# Patient Record
Sex: FEMALE | Race: WHITE | NOT HISPANIC OR LATINO | Employment: OTHER | URBAN - METROPOLITAN AREA
[De-identification: names, ages, dates, MRNs, and addresses within clinical notes are randomized per-mention and may not be internally consistent; named-entity substitution may affect disease eponyms.]

---

## 2017-02-13 ENCOUNTER — TRANSCRIBE ORDERS (OUTPATIENT)
Dept: ADMINISTRATIVE | Facility: HOSPITAL | Age: 62
End: 2017-02-13

## 2017-02-13 DIAGNOSIS — I35.9 AORTIC VALVE DISEASE: Primary | ICD-10-CM

## 2017-02-13 DIAGNOSIS — I51.7 LEFT VENTRICULAR HYPERTROPHY: ICD-10-CM

## 2017-02-13 DIAGNOSIS — I15.8 OTHER SECONDARY HYPERTENSION: ICD-10-CM

## 2017-03-21 ENCOUNTER — HOSPITAL ENCOUNTER (OUTPATIENT)
Dept: RADIOLOGY | Age: 62
Discharge: HOME | End: 2017-03-21
Payer: COMMERCIAL

## 2017-03-21 DIAGNOSIS — Z12.31 ENCOUNTER FOR SCREENING MAMMOGRAM FOR MALIGNANT NEOPLASM OF BREAST: ICD-10-CM

## 2017-03-21 PROCEDURE — G0202 SCR MAMMO BI INCL CAD: HCPCS

## 2017-04-04 ENCOUNTER — HOSPITAL ENCOUNTER (OUTPATIENT)
Dept: NON INVASIVE DIAGNOSTICS | Facility: CLINIC | Age: 62
Discharge: HOME | End: 2017-04-04
Payer: COMMERCIAL

## 2017-04-04 DIAGNOSIS — I51.7 LEFT VENTRICULAR HYPERTROPHY: ICD-10-CM

## 2017-04-04 DIAGNOSIS — I15.8 OTHER SECONDARY HYPERTENSION: ICD-10-CM

## 2017-04-04 DIAGNOSIS — I35.9 AORTIC VALVE DISEASE: ICD-10-CM

## 2017-04-04 PROCEDURE — 93306 TTE W/DOPPLER COMPLETE: CPT

## 2017-05-25 ENCOUNTER — ALLSCRIPTS OFFICE VISIT (OUTPATIENT)
Dept: OTHER | Facility: OTHER | Age: 62
End: 2017-05-25

## 2017-12-04 ENCOUNTER — ALLSCRIPTS OFFICE VISIT (OUTPATIENT)
Dept: OTHER | Facility: OTHER | Age: 62
End: 2017-12-04

## 2017-12-05 NOTE — PROGRESS NOTES
Assessment  Assessed    1  Aortic dilatation (447 70) (I77 819)   2  Aortic valve disease (424 1) (I35 9)   3  HTN (hypertension) (401 9) (I10)   4  LVH (left ventricular hypertrophy) (429 3) (I51 7)    Plan  Aortic dilatation, Aortic valve disease, HTN (hypertension), LVH (left ventricularhypertrophy)    · Follow-up visit in 6 months Evaluation and Treatment  Follow-up  Status: Hold For -Scheduling  Requested for: [id number]   Ordered; Aortic dilatation, Aortic valve disease, HTN (hypertension), LVH (left ventricular hypertrophy); Ordered By: Nikko Chi Performed:  Due: [id number]   · ECHO COMPLETE WITH CONTRAST IF INDICATED; Status:Need Information - FinancialAuthorization; Requested for:04Apr2018; Perform:[facility] Radiology; JKD:[id number], Aortic valve disease, HTN (hypertension), LVH (left ventricular hypertrophy); Ordered By:Brittani Joseph; Discussion/Summary  Cardiology Discussion Summary Free Text Note Form [facility]:   I will continue her present medical regimen  She appears well compensated  I've asked her to call if there is a problem in the interim otherwise I will see her again in six months time  She is due for an echo prior to her next visit to assess her aortic regurgitation and the diameter of her ascending aorta which has been mildly dilated  Chief Complaint  Chief Complaint Free Text Note Form: Pt here today for cardiology fu; no c/o      History of Present Illness  Cardiology HPI Free Text Note Form [facility]: Patient is here for a follow-up visit  She was last seen by me in May 2017  Since that time she has done well  She has had no chest pain or significant dyspnea  Aortic Regurgitation (Brief): The patient is being seen for a routine clinic follow-up of aortic valve regurgitation  The patient is currently asymptomatic  Cardiomegaly: The patient is being seen for a routine clinic follow-up of cardiomegaly  The patient is currently asymptomatic     Aortic Aneurysm, Thoracic (Brief): The patient is being seen for a routine clinic follow-up of thoracic aortic aneurysm  The patient is currently asymptomatic  Hypertension (Follow-Up): The patient presents for follow-up of essential hypertension  The patient states she has been doing well with her blood pressure control since the last visit  Symptoms:      Review of Systems  Cardiology Female ROS:    Cardiac: No complaints of chest pain, no palpitations, no fainting  Skin: No complaints of nonhealing sores or skin rash  Genitourinary: No complaints of recurrent urinary tract infections, frequent urination at night, difficult urination, blood in urine, kidney stones, loss of bladder control, kidney problems, denies any birth control or hormone replacement, is not post menopausal, not currently pregnant  Psychological: No complaints of feeling depressed, anxiety, panic attacks, or difficulty concentrating  General: No complaints of trouble sleeping, lack of energy, fatigue, appetite changes, weight changes, fever, frequent infections, or night sweats  Respiratory: No complaints of shortness of breath, cough with sputum, or wheezing  HEENT: No complaints of serious problems, hearing problems, nose problems, throat problems, or snoring  Gastrointestinal: No complaints of liver problems, nausea, vomiting, heartburn, constipation, bloody stools, diarrhea, problems swallowing, adbominal pain, or rectal bleeding  Hematologic: No complaints of bleeding disorders, anemia, blood clots, or excessive brusing  Neurological: No complaints of numbness, tingling, dizziness, weakness, seizures, headaches, syncope or fainting, AM fatigue, daytime sleepiness, no witnessed apnea episodes  Musculoskeletal: No complaints of arthritis, back pain, or painfull swelling  Active Problems  Problems    1  Aortic dilatation (447 70) (I77 819)   2  Aortic valve disease (424 1) (I35 9)   3  HTN (hypertension) (401 9) (I10)   4   LVH (left ventricular hypertrophy) (429 3) (I51 7)   5  Murmur (785 2) (R01 1)   6  Palpitations (785 1) (R00 2)    Past Medical History  Problems    1  History of carpal tunnel syndrome (V12 49) (Z86 69)   2  History of Vitamin B 12 deficiency (266 2) (E53 8)  Active Problems And Past Medical History Reviewed: The active problems and past medical history were reviewed and updated today  Surgical History  Problems    1  History of Cholecystectomy   2  History of Dilation And Curettage   3  History of Oral Surgery    Family History  Mother    1  Family history of hypertension (V17 49) (Z82 49)   2  History of heart surgery  Father    3  Family history of liver cancer (V16 0) (Z80 0)    Social History  Problems    ·    · Never a smoker   · Social alcohol use (Z78 9)   · Denied: History of Tobacco use    Current Meds   1  Calcium 600 MG Oral Tablet; take 2 tablet daily; Therapy: (Recorded:18Jun2015) to Recorded   2  Claritin 10 MG Oral Capsule; take 1 capsule daily; Therapy: (Recorded:[id number]) to Recorded   3  Labetalol HCl - 100 MG Oral Tablet; TAKE 1 TABLET EVERY 12 HOURS DAILY; Therapy: (Recorded:18Jun2015) to Recorded   4  Multivitamin TABS; TAKE 1 TABLET DAILY; Therapy: (Recorded:18Jun2015) to Recorded   5  Trandolapril 4 MG Oral Tablet; TAKE 1 TABLET DAILY; Therapy: (Recorded:18Jun2015) to Recorded   6  Verapamil HCl  MG Oral Capsule Extended Release 24 Hour; TAKE 1 CAPSULE DAILY; Therapy: (Recorded:18Jun2015) to Recorded   7  Vitamin B-12 500 MCG Oral Tablet; TAKE 1 TABLET DAILY; Therapy: (Recorded:18Jun2015) to Recorded  Medication List Reviewed: The medication list was reviewed and updated today  Allergies  Medication    1   Penicillins    Vitals  Vital Signs    Recorded: [id number] 07:53AM   Heart Rate 78, Apical   Pulse Quality Regular, Apical   Systolic 009, RUE, Sitting   Diastolic 74, RUE, Sitting   Height 5 ft 6 in   Weight 239 lb    BMI Calculated 38 58   BSA Calculated 2 16 Physical Exam   Constitutional  General appearance: No acute distress, well appearing and well nourished  Eyes  Conjunctiva and Sclera examination: Conjunctiva pink, sclera anicteric  Ears, Nose, Mouth, and Throat - Oropharynx: Clear, nares are clear, mucous membranes are moist   Neck  Neck and thyroid: Normal, supple, trachea midline, no thyromegaly  Pulmonary  Respiratory effort: No increased work of breathing or signs of respiratory distress  Auscultation of lungs: Clear to auscultation, no rales, no rhonchi, no wheezing, good air movement  Cardiovascular  Palpation of heart: Normal PMI, no thrills  Auscultation of heart: Abnormal  -- S1-S2 with an early peaking systolic murmur heard at right upper sternal border  Carotid pulses: Normal, 2+ bilaterally  Examination of extremities for edema and/or varicosities: Normal    Chest - Chest: Normal   Musculoskeletal Gait and station: Normal gait  -- Digits and nails: Normal without clubbing or cyanosis    Neurologic - Speech: Normal    Psychiatric - Orientation to person, place, and time: Normal -- Mood and affect: Normal       Signatures   Electronically signed by : RAFA Rodrigez ; Dec  4 2017  8:05AM EST                       (Author)

## 2018-01-12 VITALS
SYSTOLIC BLOOD PRESSURE: 148 MMHG | BODY MASS INDEX: 38.16 KG/M2 | HEART RATE: 74 BPM | HEIGHT: 66 IN | DIASTOLIC BLOOD PRESSURE: 82 MMHG | WEIGHT: 237.44 LBS

## 2018-01-23 VITALS
BODY MASS INDEX: 38.41 KG/M2 | SYSTOLIC BLOOD PRESSURE: 132 MMHG | HEIGHT: 66 IN | WEIGHT: 239 LBS | DIASTOLIC BLOOD PRESSURE: 74 MMHG | HEART RATE: 78 BPM

## 2018-03-27 ENCOUNTER — HOSPITAL ENCOUNTER (OUTPATIENT)
Dept: RADIOLOGY | Age: 63
Discharge: HOME | End: 2018-03-27

## 2018-03-27 DIAGNOSIS — Z12.31 ENCOUNTER FOR SCREENING MAMMOGRAM FOR MALIGNANT NEOPLASM OF BREAST: ICD-10-CM

## 2018-06-08 ENCOUNTER — TELEPHONE (OUTPATIENT)
Dept: CARDIOLOGY CLINIC | Facility: CLINIC | Age: 63
End: 2018-06-08

## 2018-06-08 DIAGNOSIS — I35.9 AORTIC VALVE DISEASE: Primary | ICD-10-CM

## 2018-12-10 ENCOUNTER — HOSPITAL ENCOUNTER (OUTPATIENT)
Dept: NON INVASIVE DIAGNOSTICS | Facility: CLINIC | Age: 63
Discharge: HOME | End: 2018-12-10
Payer: COMMERCIAL

## 2018-12-10 DIAGNOSIS — I35.9 AORTIC VALVE DISEASE: ICD-10-CM

## 2018-12-10 PROCEDURE — 93306 TTE W/DOPPLER COMPLETE: CPT | Performed by: INTERNAL MEDICINE

## 2018-12-10 PROCEDURE — 93306 TTE W/DOPPLER COMPLETE: CPT

## 2019-03-19 NOTE — PROGRESS NOTES
Cardiology Follow Up    Brayan Dias  1955  [id number]  [address] [facility] [address]    1  Essential (primary) hypertension  POCT ECG   2  LVH (left ventricular hypertrophy)  POCT ECG   3  Aortic valve disease  POCT ECG       Interval History:  Patient is here for a follow-up visit  She was most recently seen by me in December 2017  Her most recent echocardiogram done in December 2018 demonstrated lower limits of normal LV systolic function with borderline concentric left ventricular hypertrophy and mild to moderate aortic regurgitation  I did review the study and the ejection fraction to me appears to be in the 55-60% range  Compared to a prior study done April 2017 there was less aortic regurgitation noted on the present study  Patient has been feeling well  She has had no chest pain or significant dyspnea  EKG today demonstrates normal sinus rhythm with left axis deviation and minor nonspecific ST segment changes with no significant change compared to a prior tracing 05/25/2017  There is no problem list on file for this patient  History reviewed  No pertinent past medical history    Social History     Socioeconomic History    Marital status: /Civil Union     Spouse name: Not on file    Number of children: Not on file    Years of education: Not on file    Highest education level: Not on file   Occupational History    Not on file   Social Needs    Financial resource strain: Not on file    Food insecurity:     Worry: Not on file     Inability: Not on file    Transportation needs:     Medical: Not on file     Non-medical: Not on file   Tobacco Use    Smoking status: Former Smoker    Smokeless tobacco: Never Used   Substance and Sexual Activity    Alcohol use: Yes     Frequency: 2-3 times a week    Drug use: Not on file    Sexual activity: Not on file Lifestyle    Physical activity:     Days per week: Not on file     Minutes per session: Not on file    Stress: Not on file   Relationships    Social connections:     Talks on phone: Not on file     Gets together: Not on file     Attends Taoism service: Not on file     Active member of club or organization: Not on file     Attends meetings of clubs or organizations: Not on file     Relationship status: Not on file    Intimate partner violence:     Fear of current or ex partner: Not on file     Emotionally abused: Not on file     Physically abused: Not on file     Forced sexual activity: Not on file   Other Topics Concern    Not on file   Social History Narrative    Not on file      History reviewed  No pertinent family history  History reviewed  No pertinent surgical history  Current Outpatient Medications:     Calcium 600 MG tablet, Take 2 tablets by mouth daily, Disp: , Rfl:     cyanocobalamin (VITAMIN B-12) 500 mcg tablet, Take 1 tablet by mouth daily, Disp: , Rfl:     labetalol (NORMODYNE) 100 mg tablet, Every 12 hours, Disp: , Rfl:     Loratadine (CLARITIN) 10 MG CAPS, Take 1 capsule by mouth daily, Disp: , Rfl:     Multiple Vitamins-Minerals (MULTIVITAMIN ADULT) TABS, Take 1 tablet by mouth daily, Disp: , Rfl:     rosuvastatin (CRESTOR) 5 mg tablet, Take 5 mg by mouth daily, Disp: , Rfl: 0    trandolapril (MAVIK) 4 MG tablet, Daily, Disp: , Rfl:     verapamil (CALAN-SR) 240 mg CR tablet, Daily, Disp: , Rfl:   Allergies   Allergen Reactions    Penicillins Rash     Other reaction(s): PENICILLIN G POTASSIUM (PENICILLIN)         Labs:not applicable  Imaging: No results found  Review of Systems:  Review of Systems   All other systems reviewed and are negative        Physical Exam:  /78 (BP Location: Right arm, Patient Position: Sitting, Cuff Size: Large)   Pulse 74   Ht 5' 6" (1 676 m)   Wt 104 kg (229 lb 9 6 oz)   SpO2 96%   BMI 37 06 kg/m²   Physical Exam   Constitutional: She is oriented to person, place, and time  She appears well-developed and well-nourished  HENT:   Head: Normocephalic and atraumatic  Eyes: Pupils are equal, round, and reactive to light  Conjunctivae and EOM are normal    Neck: Normal range of motion  Neck supple  Cardiovascular: Normal rate and normal heart sounds  Pulmonary/Chest: Effort normal and breath sounds normal    Neurological: She is alert and oriented to person, place, and time  Skin: Skin is warm and dry  Psychiatric: She has a normal mood and affect  Vitals reviewed  Discussion/Summary:I will continue the patient's present medical regimen  The patient appears well compensated  I have asked the patient to call if there is a problem in the interim otherwise I will see the patient in one years time  Patient is due for an echocardiogram prior to the next visit to assess LV wall thickness and systolic function

## 2019-03-22 ENCOUNTER — OFFICE VISIT (OUTPATIENT)
Dept: CARDIOLOGY CLINIC | Facility: CLINIC | Age: 64
End: 2019-03-22
Payer: COMMERCIAL

## 2019-03-22 VITALS
WEIGHT: 229.6 LBS | HEART RATE: 74 BPM | HEIGHT: 66 IN | BODY MASS INDEX: 36.9 KG/M2 | OXYGEN SATURATION: 96 % | SYSTOLIC BLOOD PRESSURE: 136 MMHG | DIASTOLIC BLOOD PRESSURE: 78 MMHG

## 2019-03-22 DIAGNOSIS — I10 ESSENTIAL (PRIMARY) HYPERTENSION: Primary | ICD-10-CM

## 2019-03-22 DIAGNOSIS — I51.7 LVH (LEFT VENTRICULAR HYPERTROPHY): ICD-10-CM

## 2019-03-22 DIAGNOSIS — I35.9 AORTIC VALVE DISEASE: ICD-10-CM

## 2019-03-22 PROCEDURE — 93000 ELECTROCARDIOGRAM COMPLETE: CPT | Performed by: INTERNAL MEDICINE

## 2019-03-22 PROCEDURE — 99214 OFFICE O/P EST MOD 30 MIN: CPT | Performed by: INTERNAL MEDICINE

## 2019-03-22 RX ORDER — ROSUVASTATIN CALCIUM 5 MG/1
5 TABLET, COATED ORAL DAILY
Refills: 0 | COMMUNITY
Start: 2019-03-18 | End: 2020-10-20

## 2019-03-22 RX ORDER — TRANDOLAPRIL TABLETS 4 MG/1
TABLET ORAL DAILY
COMMUNITY
Start: 2015-12-09 | End: 2020-09-08

## 2019-03-22 RX ORDER — CHOLECALCIFEROL (VITAMIN D3) 125 MCG
1 CAPSULE ORAL DAILY
COMMUNITY

## 2019-03-22 RX ORDER — VERAPAMIL HYDROCHLORIDE 240 MG/1
TABLET, FILM COATED, EXTENDED RELEASE ORAL DAILY
COMMUNITY
End: 2020-10-20

## 2019-03-22 RX ORDER — LABETALOL 100 MG/1
TABLET, FILM COATED ORAL EVERY 12 HOURS
COMMUNITY
Start: 2015-12-09 | End: 2020-09-25

## 2019-03-22 RX ORDER — LORATADINE 10 MG/1
1 CAPSULE, LIQUID FILLED ORAL DAILY
COMMUNITY
End: 2020-03-05

## 2019-03-22 RX ORDER — IBUPROFEN 200 MG
2 CAPSULE ORAL DAILY
COMMUNITY

## 2019-03-22 RX ORDER — ELECTROLYTES/DEXTROSE
1 SOLUTION, ORAL ORAL DAILY
COMMUNITY

## 2020-02-19 ENCOUNTER — HOSPITAL ENCOUNTER (OUTPATIENT)
Dept: NON INVASIVE DIAGNOSTICS | Facility: CLINIC | Age: 65
Discharge: HOME | End: 2020-02-19
Payer: COMMERCIAL

## 2020-02-19 DIAGNOSIS — I10 ESSENTIAL (PRIMARY) HYPERTENSION: ICD-10-CM

## 2020-02-19 DIAGNOSIS — I35.9 AORTIC VALVE DISEASE: ICD-10-CM

## 2020-02-19 DIAGNOSIS — I51.7 LVH (LEFT VENTRICULAR HYPERTROPHY): ICD-10-CM

## 2020-02-19 PROCEDURE — 93306 TTE W/DOPPLER COMPLETE: CPT | Performed by: INTERNAL MEDICINE

## 2020-02-19 PROCEDURE — 93306 TTE W/DOPPLER COMPLETE: CPT

## 2020-02-29 NOTE — PROGRESS NOTES
Cardiology Follow Up    Aniyah Sadler  1955  [id number]  [address] [facility]  [address] [address] [facility]   [phone]    1  Essential (primary) hypertension  POCT ECG   2  LVH (left ventricular hypertrophy)  POCT ECG   3  Aortic valve disease  POCT ECG       Interval History:  Patient is here for a follow-up visit  Her most recent echocardiogram done in 2/2020 demonstrated  normal LV systolic function with borderline concentric left ventricular hypertrophy and mild to moderate aortic regurgitation  Compared to a prior study done April 2017 there was less aortic regurgitation noted on the present study  very mild aortic valve stenosis was noted  The peak continuous wave velocity across the aortic valve was 2 01 m/sec  Normal pulmonary artery pressure was noted  EKG today demonstrates sinus rhythm with leftward axis and nonspecific ST segment changes with no significant change compared to a prior tracing done March 27, 2019  Patient has been well  She has had no chest pain or significant dyspnea  She is now retired  Vital signs are stable today  There is no problem list on file for this patient  No past medical history on file    Social History     Socioeconomic History    Marital status: /Civil Union     Spouse name: Not on file    Number of children: Not on file    Years of education: Not on file    Highest education level: Not on file   Occupational History    Not on file   Social Needs    Financial resource strain: Not on file    Food insecurity:     Worry: Not on file     Inability: Not on file    Transportation needs:     Medical: Not on file     Non-medical: Not on file   Tobacco Use    Smoking status: Former Smoker     Types: Cigarettes     Last attempt to quit: 2005     Years since quitting: 15 1    Smokeless tobacco: Never Used   Substance and Sexual Activity    Alcohol use: Yes     Frequency: 2-3 times a week    Drug use: Not on file    Sexual activity: Not on file   Lifestyle    Physical activity:     Days per week: Not on file     Minutes per session: Not on file    Stress: Not on file   Relationships    Social connections:     Talks on phone: Not on file     Gets together: Not on file     Attends [employer] service: Not on file     Active member of club or organization: Not on file     Attends meetings of clubs or organizations: Not on file     Relationship status: Not on file    Intimate partner violence:     Fear of current or ex partner: Not on file     Emotionally abused: Not on file     Physically abused: Not on file     Forced sexual activity: Not on file   Other Topics Concern    Not on file   Social History Narrative    Not on file      No family history on file  No past surgical history on file  Current Outpatient Medications:     Calcium 600 MG tablet, Take 2 tablets by mouth daily, Disp: , Rfl:     cyanocobalamin (VITAMIN B-12) 500 mcg tablet, Take 1 tablet by mouth daily, Disp: , Rfl:     labetalol (NORMODYNE) 100 mg tablet, Every 12 hours, Disp: , Rfl:     Multiple Vitamins-Minerals (MULTIVITAMIN ADULT) TABS, Take 1 tablet by mouth daily, Disp: , Rfl:     trandolapril (MAVIK) 4 MG tablet, Daily, Disp: , Rfl:     verapamil (CALAN-SR) 240 mg CR tablet, Daily, Disp: , Rfl:     rosuvastatin (CRESTOR) 5 mg tablet, Take 5 mg by mouth daily, Disp: , Rfl: 0  Allergies   Allergen Reactions    Penicillins Rash     Other reaction(s): PENICILLIN G POTASSIUM (PENICILLIN)         Labs:not applicable  Imaging: No results found  Review of Systems:  Review of Systems   All other systems reviewed and are negative  Physical Exam:  /62 (BP Location: Left arm, Cuff Size: Large)   Pulse 66   Ht 5' 6" (1 676 m)   Wt 89 8 kg (198 lb)   BMI 31 96 kg/m²   Physical Exam   Constitutional: She is oriented to person, place, and time   She appears well-developed and well-nourished  HENT:   Head: Normocephalic and atraumatic  Eyes: Pupils are equal, round, and reactive to light  Conjunctivae and EOM are normal    Neck: Normal range of motion  Neck supple  Cardiovascular: Normal rate and normal heart sounds  Pulmonary/Chest: Effort normal and breath sounds normal    Neurological: She is alert and oriented to person, place, and time  Skin: Skin is warm and dry  Psychiatric: She has a normal mood and affect  Vitals reviewed  Discussion/Summary:I will continue the patient's present medical regimen  The patient appears well compensated  I have asked the patient to call if there is a problem in the interim otherwise I will see the patient in one years time  Patient is due for an echocardiogram prior to the next visit to assess LV wall thickness and systolic function

## 2020-03-05 ENCOUNTER — OFFICE VISIT (OUTPATIENT)
Dept: CARDIOLOGY CLINIC | Facility: CLINIC | Age: 65
End: 2020-03-05
Payer: COMMERCIAL

## 2020-03-05 VITALS
DIASTOLIC BLOOD PRESSURE: 62 MMHG | HEIGHT: 66 IN | SYSTOLIC BLOOD PRESSURE: 120 MMHG | HEART RATE: 66 BPM | BODY MASS INDEX: 31.82 KG/M2 | WEIGHT: 198 LBS

## 2020-03-05 DIAGNOSIS — I51.7 LVH (LEFT VENTRICULAR HYPERTROPHY): ICD-10-CM

## 2020-03-05 DIAGNOSIS — I35.9 AORTIC VALVE DISEASE: ICD-10-CM

## 2020-03-05 DIAGNOSIS — I10 ESSENTIAL (PRIMARY) HYPERTENSION: Primary | ICD-10-CM

## 2020-03-05 PROCEDURE — 93000 ELECTROCARDIOGRAM COMPLETE: CPT | Performed by: INTERNAL MEDICINE

## 2020-03-05 PROCEDURE — 99214 OFFICE O/P EST MOD 30 MIN: CPT | Performed by: INTERNAL MEDICINE

## 2020-09-05 DIAGNOSIS — I10 ESSENTIAL HYPERTENSION: Primary | ICD-10-CM

## 2020-09-08 RX ORDER — TRANDOLAPRIL TABLETS 4 MG/1
TABLET ORAL
Qty: 90 TABLET | Refills: 3 | Status: SHIPPED | OUTPATIENT
Start: 2020-09-08 | End: 2020-10-20 | Stop reason: SDUPTHER

## 2020-09-14 DIAGNOSIS — I10 ESSENTIAL HYPERTENSION: Primary | ICD-10-CM

## 2020-09-14 RX ORDER — VERAPAMIL HYDROCHLORIDE 80 MG/1
TABLET ORAL
Qty: 270 TABLET | Refills: 1 | Status: SHIPPED | OUTPATIENT
Start: 2020-09-14 | End: 2020-10-20 | Stop reason: SDUPTHER

## 2020-09-24 DIAGNOSIS — I10 ESSENTIAL HYPERTENSION: Primary | ICD-10-CM

## 2020-09-25 RX ORDER — LABETALOL 100 MG/1
TABLET, FILM COATED ORAL
Qty: 180 TABLET | Refills: 3 | Status: SHIPPED | OUTPATIENT
Start: 2020-09-25 | End: 2020-10-20 | Stop reason: SDUPTHER

## 2020-10-02 LAB
ALBUMIN SERPL-MCNC: 4.1 G/DL (ref 3.6–5.1)
ALBUMIN/GLOB SERPL: 1.9 (CALC) (ref 1–2.5)
ALP SERPL-CCNC: 67 U/L (ref 37–153)
ALT SERPL-CCNC: 9 U/L (ref 6–29)
APPEARANCE UR: CLEAR
AST SERPL-CCNC: 13 U/L (ref 10–35)
BACTERIA UR QL AUTO: ABNORMAL /HPF
BASOPHILS # BLD AUTO: 41 CELLS/UL (ref 0–200)
BASOPHILS NFR BLD AUTO: 0.8 %
BILIRUB SERPL-MCNC: 0.6 MG/DL (ref 0.2–1.2)
BILIRUB UR QL STRIP: NEGATIVE
BUN SERPL-MCNC: 25 MG/DL (ref 7–25)
BUN/CREAT SERPL: ABNORMAL (CALC) (ref 6–22)
CALCIUM SERPL-MCNC: 9.1 MG/DL (ref 8.6–10.4)
CHLORIDE SERPL-SCNC: 106 MMOL/L (ref 98–110)
CHOLEST SERPL-MCNC: 218 MG/DL
CHOLEST/HDLC SERPL: 3.1 (CALC)
CO2 SERPL-SCNC: 26 MMOL/L (ref 20–32)
COLOR UR: YELLOW
CREAT SERPL-MCNC: 0.96 MG/DL (ref 0.5–0.99)
EOSINOPHIL # BLD AUTO: 112 CELLS/UL (ref 15–500)
EOSINOPHIL NFR BLD AUTO: 2.2 %
ERYTHROCYTE [DISTWIDTH] IN BLOOD BY AUTOMATED COUNT: 14 % (ref 11–15)
GLOBULIN SER CALC-MCNC: 2.2 G/DL (CALC) (ref 1.9–3.7)
GLUCOSE SERPL-MCNC: 113 MG/DL (ref 65–99)
GLUCOSE UR QL STRIP: NEGATIVE
HCT VFR BLD AUTO: 43.6 % (ref 35–45)
HDLC SERPL-MCNC: 71 MG/DL
HGB BLD-MCNC: 14.6 G/DL (ref 11.7–15.5)
HGB UR QL STRIP: NEGATIVE
HYALINE CASTS #/AREA URNS LPF: ABNORMAL /LPF
KETONES UR QL STRIP: NEGATIVE
LDLC SERPL CALC-MCNC: 118 MG/DL (CALC)
LEUKOCYTE ESTERASE UR QL STRIP: ABNORMAL
LYMPHOCYTES # BLD AUTO: 1540 CELLS/UL (ref 850–3900)
LYMPHOCYTES NFR BLD AUTO: 30.2 %
MCH RBC QN AUTO: 31.2 PG (ref 27–33)
MCHC RBC AUTO-ENTMCNC: 33.5 G/DL (ref 32–36)
MCV RBC AUTO: 93.2 FL (ref 80–100)
MONOCYTES # BLD AUTO: 372 CELLS/UL (ref 200–950)
MONOCYTES NFR BLD AUTO: 7.3 %
NEUTROPHILS # BLD AUTO: 3035 CELLS/UL (ref 1500–7800)
NEUTROPHILS NFR BLD AUTO: 59.5 %
NITRITE UR QL STRIP: NEGATIVE
NONHDLC SERPL-MCNC: 147 MG/DL (CALC)
PH UR STRIP: 5.5 [PH] (ref 5–8)
PLATELET # BLD AUTO: 175 THOUSAND/UL (ref 140–400)
PMV BLD REES-ECKER: 11.6 FL (ref 7.5–12.5)
POTASSIUM SERPL-SCNC: 4.4 MMOL/L (ref 3.5–5.3)
PROT SERPL-MCNC: 6.3 G/DL (ref 6.1–8.1)
PROT UR QL STRIP: NEGATIVE
RBC # BLD AUTO: 4.68 MILLION/UL (ref 3.8–5.1)
RBC #/AREA URNS HPF: ABNORMAL /HPF
SL AMB EGFR AFRICAN AMERICAN: 72 ML/MIN/1.73M2
SL AMB EGFR NON AFRICAN AMERICAN: 62 ML/MIN/1.73M2
SODIUM SERPL-SCNC: 140 MMOL/L (ref 135–146)
SP GR UR STRIP: 1.02 (ref 1–1.03)
SQUAMOUS #/AREA URNS HPF: ABNORMAL /HPF
TRIGL SERPL-MCNC: 171 MG/DL
TSH SERPL-ACNC: 2.02 MIU/L (ref 0.4–4.5)
WBC # BLD AUTO: 5.1 THOUSAND/UL (ref 3.8–10.8)
WBC #/AREA URNS HPF: ABNORMAL /HPF

## 2020-10-20 ENCOUNTER — OFFICE VISIT (OUTPATIENT)
Dept: INTERNAL MEDICINE CLINIC | Facility: CLINIC | Age: 65
End: 2020-10-20
Payer: COMMERCIAL

## 2020-10-20 VITALS
WEIGHT: 214 LBS | HEIGHT: 66 IN | HEART RATE: 73 BPM | SYSTOLIC BLOOD PRESSURE: 138 MMHG | BODY MASS INDEX: 34.39 KG/M2 | TEMPERATURE: 97.9 F | DIASTOLIC BLOOD PRESSURE: 88 MMHG

## 2020-10-20 DIAGNOSIS — G47.33 OBSTRUCTIVE SLEEP APNEA SYNDROME: ICD-10-CM

## 2020-10-20 DIAGNOSIS — E53.8 VITAMIN B12 DEFICIENCY: ICD-10-CM

## 2020-10-20 DIAGNOSIS — I10 ESSENTIAL HYPERTENSION: Primary | ICD-10-CM

## 2020-10-20 DIAGNOSIS — I35.1 NONRHEUMATIC AORTIC (VALVE) INSUFFICIENCY: ICD-10-CM

## 2020-10-20 PROCEDURE — 99214 OFFICE O/P EST MOD 30 MIN: CPT | Performed by: INTERNAL MEDICINE

## 2020-10-20 RX ORDER — VERAPAMIL HYDROCHLORIDE 80 MG/1
80 TABLET ORAL 3 TIMES DAILY
Qty: 270 TABLET | Refills: 1 | Status: SHIPPED | OUTPATIENT
Start: 2020-10-20 | End: 2021-07-06

## 2020-10-20 RX ORDER — LABETALOL 100 MG/1
100 TABLET, FILM COATED ORAL 2 TIMES DAILY
Qty: 180 TABLET | Refills: 1 | Status: SHIPPED | OUTPATIENT
Start: 2020-10-20 | End: 2021-05-26 | Stop reason: SDUPTHER

## 2020-10-20 RX ORDER — TRANDOLAPRIL TABLETS 4 MG/1
4 TABLET ORAL DAILY
Qty: 90 TABLET | Refills: 1 | Status: SHIPPED | OUTPATIENT
Start: 2020-10-20 | End: 2021-07-08

## 2020-10-21 ENCOUNTER — TELEPHONE (OUTPATIENT)
Dept: INTERNAL MEDICINE CLINIC | Facility: CLINIC | Age: 65
End: 2020-10-21

## 2021-02-22 ENCOUNTER — HOSPITAL ENCOUNTER (OUTPATIENT)
Dept: NON INVASIVE DIAGNOSTICS | Facility: CLINIC | Age: 66
Discharge: HOME | End: 2021-02-22
Payer: COMMERCIAL

## 2021-02-22 DIAGNOSIS — I10 ESSENTIAL (PRIMARY) HYPERTENSION: ICD-10-CM

## 2021-02-22 DIAGNOSIS — I35.9 AORTIC VALVE DISEASE: ICD-10-CM

## 2021-02-22 DIAGNOSIS — I51.7 LVH (LEFT VENTRICULAR HYPERTROPHY): ICD-10-CM

## 2021-02-22 PROCEDURE — 93306 TTE W/DOPPLER COMPLETE: CPT | Performed by: INTERNAL MEDICINE

## 2021-02-22 PROCEDURE — 93306 TTE W/DOPPLER COMPLETE: CPT

## 2021-02-24 ENCOUNTER — OFFICE VISIT (OUTPATIENT)
Dept: INTERNAL MEDICINE CLINIC | Facility: CLINIC | Age: 66
End: 2021-02-24
Payer: COMMERCIAL

## 2021-02-24 VITALS
DIASTOLIC BLOOD PRESSURE: 84 MMHG | WEIGHT: 220 LBS | TEMPERATURE: 98.5 F | OXYGEN SATURATION: 97 % | HEIGHT: 66 IN | SYSTOLIC BLOOD PRESSURE: 137 MMHG | BODY MASS INDEX: 35.36 KG/M2 | HEART RATE: 76 BPM

## 2021-02-24 DIAGNOSIS — G47.33 OBSTRUCTIVE SLEEP APNEA SYNDROME: ICD-10-CM

## 2021-02-24 DIAGNOSIS — E78.2 MIXED HYPERLIPIDEMIA: ICD-10-CM

## 2021-02-24 DIAGNOSIS — E53.8 VITAMIN B12 DEFICIENCY: ICD-10-CM

## 2021-02-24 DIAGNOSIS — G43.109 MIGRAINE WITH AURA AND WITHOUT STATUS MIGRAINOSUS, NOT INTRACTABLE: Primary | ICD-10-CM

## 2021-02-24 DIAGNOSIS — Z11.59 NEED FOR HEPATITIS C SCREENING TEST: ICD-10-CM

## 2021-02-24 DIAGNOSIS — I10 ESSENTIAL HYPERTENSION: ICD-10-CM

## 2021-02-24 DIAGNOSIS — I35.1 NONRHEUMATIC AORTIC (VALVE) INSUFFICIENCY: ICD-10-CM

## 2021-02-24 PROCEDURE — 99214 OFFICE O/P EST MOD 30 MIN: CPT | Performed by: INTERNAL MEDICINE

## 2021-02-24 RX ORDER — PROTRIPTYLINE HYDROCHLORIDE 5 MG/1
5 TABLET, FILM COATED ORAL
Qty: 30 TABLET | Refills: 2 | Status: SHIPPED | OUTPATIENT
Start: 2021-02-24 | End: 2021-04-07 | Stop reason: SDUPTHER

## 2021-02-24 RX ORDER — ROSUVASTATIN CALCIUM 5 MG/1
5 TABLET, COATED ORAL DAILY
Qty: 30 TABLET | Refills: 2 | Status: SHIPPED | OUTPATIENT
Start: 2021-02-24 | End: 2021-04-07 | Stop reason: SDUPTHER

## 2021-02-24 NOTE — PROGRESS NOTES
Assessment/Plan:           1  Migraine with aura and without status migrainosus, not intractable  -     protriptyline (VIVACTIL) 5 MG tablet; Take 1 tablet (5 mg total) by mouth daily at bedtime    2  Essential hypertension    3  Need for hepatitis C screening test  -     Hepatitis C antibody; Future    4  Nonrheumatic aortic (valve) insufficiency    5  Vitamin B12 deficiency    6  Obstructive sleep apnea syndrome    7  Mixed hyperlipidemia  -     rosuvastatin (CRESTOR) 5 mg tablet; Take 1 tablet (5 mg total) by mouth daily           1  Essential hypertension      2  Need for hepatitis C screening test    - Hepatitis C antibody; Future    3  Nonrheumatic aortic (valve) insufficiency      4  Vitamin B12 deficiency      5  Obstructive sleep apnea syndrome             Subjective:      Patient ID: Lorraine Keyes is a 76 y o  female  HPI    The following portions of the patient's history were reviewed and updated as appropriate: She  has a past medical history of Allergic rhinitis, Aortic regurgitation, HTN (hypertension), Hyperlipidemia, Nonrheumatic aortic (valve) insufficiency, Sleep apnea, and Vitamin B12 deficiency  She   Patient Active Problem List    Diagnosis Date Noted    Nonrheumatic aortic (valve) insufficiency     Sleep apnea     Vitamin B12 deficiency      She  has a past surgical history that includes Dilation and curettage of uterus; [place] tooth extraction; Bunionectomy; Cholecystectomy; Colonoscopy (07/02/2018); and Mammo (historical) (03/21/2017)  Her family history includes Coronary artery disease in her mother; Kidney cancer in her father; No Known Problems in her sister, sister, and sister  She  reports that she quit smoking about 16 years ago  Her smoking use included cigarettes  She has never used smokeless tobacco  She reports current alcohol use  No history on file for drug    Current Outpatient Medications   Medication Sig Dispense Refill    Calcium 600 MG tablet Take 2 tablets by mouth daily      cyanocobalamin (VITAMIN B-12) 500 mcg tablet Take 1 tablet by mouth daily      labetalol (NORMODYNE) 100 mg tablet Take 1 tablet (100 mg total) by mouth 2 (two) times a day 180 tablet 1    Multiple Vitamins-Minerals (MULTIVITAMIN ADULT) TABS Take 1 tablet by mouth daily      trandolapril (MAVIK) 4 MG tablet Take 1 tablet (4 mg total) by mouth daily 90 tablet 1    verapamil (CALAN) 80 mg tablet Take 1 tablet (80 mg total) by mouth 3 (three) times a day 270 tablet 1    protriptyline (VIVACTIL) 5 MG tablet Take 1 tablet (5 mg total) by mouth daily at bedtime 30 tablet 2    rosuvastatin (CRESTOR) 5 mg tablet Take 1 tablet (5 mg total) by mouth daily 30 tablet 2     No current facility-administered medications for this visit  Current Outpatient Medications on File Prior to Visit   Medication Sig    Calcium 600 MG tablet Take 2 tablets by mouth daily    cyanocobalamin (VITAMIN B-12) 500 mcg tablet Take 1 tablet by mouth daily    labetalol (NORMODYNE) 100 mg tablet Take 1 tablet (100 mg total) by mouth 2 (two) times a day    Multiple Vitamins-Minerals (MULTIVITAMIN ADULT) TABS Take 1 tablet by mouth daily    trandolapril (MAVIK) 4 MG tablet Take 1 tablet (4 mg total) by mouth daily    verapamil (CALAN) 80 mg tablet Take 1 tablet (80 mg total) by mouth 3 (three) times a day     No current facility-administered medications on file prior to visit  She is allergic to penicillins       Review of Systems   Constitutional: Negative for appetite change, chills, fatigue and fever  HENT: Negative for sore throat and trouble swallowing  Eyes: Negative for redness  Respiratory: Negative for shortness of breath  Cardiovascular: Negative for chest pain and palpitations  Gastrointestinal: Negative for abdominal pain, constipation and diarrhea  Genitourinary: Negative for dysuria and hematuria  Musculoskeletal: Negative for back pain and neck pain  Skin: Negative for rash     Neurological: Positive for headaches  Negative for seizures and weakness  Hematological: Negative for adenopathy  Psychiatric/Behavioral: Negative for confusion  The patient is not nervous/anxious  Objective:      /84 (BP Location: Left arm, Patient Position: Sitting, Cuff Size: Standard)   Pulse 76   Temp 98 5 °F (36 9 °C) (Temporal)   Ht 5' 6" (1 676 m)   Wt 99 8 kg (220 lb)   SpO2 97%   BMI 35 51 kg/m²     No results found for this or any previous visit (from the past 1344 hour(s))  Physical Exam  Constitutional:       General: She is not in acute distress  Appearance: Normal appearance  HENT:      Head: Normocephalic and atraumatic  Nose: Nose normal       Mouth/Throat:      Mouth: Mucous membranes are moist    Eyes:      Extraocular Movements: Extraocular movements intact  Pupils: Pupils are equal, round, and reactive to light  Neck:      Musculoskeletal: Normal range of motion and neck supple  Cardiovascular:      Rate and Rhythm: Normal rate and regular rhythm  Pulses: Normal pulses  Heart sounds: Murmur present  No friction rub  Pulmonary:      Effort: Pulmonary effort is normal  No respiratory distress  Breath sounds: Normal breath sounds  No wheezing  Abdominal:      General: Abdomen is flat  Bowel sounds are normal  There is no distension  Palpations: Abdomen is soft  There is no mass  Tenderness: There is no abdominal tenderness  There is no guarding  Musculoskeletal: Normal range of motion  Skin:     General: Skin is warm and dry  Neurological:      General: No focal deficit present  Mental Status: She is alert and oriented to person, place, and time  Mental status is at baseline  Cranial Nerves: No cranial nerve deficit  Psychiatric:         Mood and Affect: Mood normal          Behavior: Behavior normal        BMI Counseling: Body mass index is 35 51 kg/m²   The BMI is above normal  Nutrition recommendations include reducing portion sizes, decreasing overall calorie intake and 3-5 servings of fruits/vegetables daily

## 2021-03-10 DIAGNOSIS — Z23 ENCOUNTER FOR IMMUNIZATION: ICD-10-CM

## 2021-03-13 NOTE — PROGRESS NOTES
Cardiology Follow Up    Fito Hines  1955  [id number]  [facility]  [address]   [phone]    1  Essential (primary) hypertension  Echo complete with contrast if indicated    POCT ECG   2  LVH (left ventricular hypertrophy)  Echo complete with contrast if indicated    POCT ECG   3  Aortic valve disease  Echo complete with contrast if indicated    POCT ECG       Interval History:  Patient is here for a follow-up visit  Patient with history of aortic valve disease   Her most recent echocardiogram done February 2021 demonstrated preserved LV systolic function with fibrocalcific disease of the aortic valve with moderate AI  The ascending aorta was 4 0 cm  Peak continuous wave velocity across the aortic valve was 2 26 m/sec with a mean gradient of 10 6 mmHg  LVOT max velocity was 1 42 m/sec  Prior study done February 2020 demonstrated mild to moderate aortic AI  Lipid profile done October 2020 demonstrated total cholesterol of 218 with an HDL of 71 and a calculated LDL of 118  EKG today demonstrates sinus rhythm with poor R-wave progression with no significant change compared to a prior tracing done March 5, 2020  Vital signs are stable today      Patient Active Problem List   Diagnosis    Nonrheumatic aortic (valve) insufficiency    Sleep apnea    Vitamin B12 deficiency     Past Medical History:   Diagnosis Date    Allergic rhinitis     Aortic regurgitation     HTN (hypertension)     Hyperlipidemia     Nonrheumatic aortic (valve) insufficiency     Sleep apnea     Vitamin B12 deficiency      Social History     Socioeconomic History    Marital status: /Civil Union     Spouse name: Not on file    Number of children: 2    Years of education: Not on file    Highest education level: Not on file   Occupational History    Not on file   Social Needs    Financial resource strain: Not on file    Food insecurity     Worry: Not on file     Inability: Not on file    Transportation needs     Medical: Not on file     Non-medical: Not on file   Tobacco Use    Smoking status: Former Smoker     Types: Cigarettes     Quit date:      Years since quittin 2    Smokeless tobacco: Never Used   Substance and Sexual Activity    Alcohol use: Yes     Frequency: 2-3 times a week    Drug use: Not on file    Sexual activity: Not on file   Lifestyle    Physical activity     Days per week: Not on file     Minutes per session: Not on file    Stress: Not on file   Relationships    Social connections     Talks on phone: Not on file     Gets together: Not on file     Attends Rastafari service: Not on file     Active member of club or organization: Not on file     Attends meetings of clubs or organizations: Not on file     Relationship status: Not on file    Intimate partner violence     Fear of current or ex partner: Not on file     Emotionally abused: Not on file     Physically abused: Not on file     Forced sexual activity: Not on file   Other Topics Concern    Not on file   Social History Narrative    2 daughters    Travel outside [place]: No       Family History   Problem Relation Age of Onset    Coronary artery disease Mother     Kidney cancer Father     No Known Problems Sister     No Known Problems Sister     No Known Problems Sister      Past Surgical History:   Procedure Laterality Date    BUNIONECTOMY      CHOLECYSTECTOMY      COLONOSCOPY  2018    DILATION AND CURETTAGE OF UTERUS      MAMMO (HISTORICAL)  2017 and 3/21/2017     WISDOM TOOTH EXTRACTION         Current Outpatient Medications:     Calcium 600 MG tablet, Take 2 tablets by mouth daily, Disp: , Rfl:     cyanocobalamin (VITAMIN B-12) 500 mcg tablet, Take 1 tablet by mouth daily, Disp: , Rfl:     labetalol (NORMODYNE) 100 mg tablet, Take 1 tablet (100 mg total) by mouth 2 (two) times a day, Disp: 180 tablet, Rfl: 1    Multiple Vitamins-Minerals (MULTIVITAMIN ADULT) TABS, Take 1 tablet by mouth daily, Disp: , Rfl:     protriptyline (VIVACTIL) 5 MG tablet, Take 1 tablet (5 mg total) by mouth daily at bedtime, Disp: 30 tablet, Rfl: 2    rosuvastatin (CRESTOR) 5 mg tablet, Take 1 tablet (5 mg total) by mouth daily, Disp: 30 tablet, Rfl: 2    trandolapril (MAVIK) 4 MG tablet, Take 1 tablet (4 mg total) by mouth daily, Disp: 90 tablet, Rfl: 1    verapamil (CALAN) 80 mg tablet, Take 1 tablet (80 mg total) by mouth 3 (three) times a day, Disp: 270 tablet, Rfl: 1  Allergies   Allergen Reactions    Penicillins Rash     Other reaction(s): PENICILLIN G POTASSIUM (PENICILLIN)         Labs:not applicable  Imaging: No results found  Review of Systems:  Review of Systems   All other systems reviewed and are negative  Physical Exam:  /80 (BP Location: Right arm, Cuff Size: Large)   Pulse 64   Ht 5' 6" (1 676 m)   Wt 98 9 kg (218 lb)   BMI 35 19 kg/m²   Physical Exam  Vitals signs reviewed  Constitutional:       Appearance: She is well-developed  HENT:      Head: Normocephalic and atraumatic  Eyes:      Conjunctiva/sclera: Conjunctivae normal       Pupils: Pupils are equal, round, and reactive to light  Neck:      Musculoskeletal: Normal range of motion and neck supple  Cardiovascular:      Rate and Rhythm: Normal rate  Heart sounds: Normal heart sounds  Pulmonary:      Effort: Pulmonary effort is normal       Breath sounds: Normal breath sounds  Skin:     General: Skin is warm and dry  Neurological:      Mental Status: She is alert and oriented to person, place, and time  Discussion/Summary:I will continue the patient's present medical regimen  The patient appears well compensated  I have asked the patient to call if there is a problem in the interim otherwise I will see the patient in one years time   Patient is due for an echocardiogram prior to the next visit to assess LV wall thickness and systolic function

## 2021-03-23 ENCOUNTER — OFFICE VISIT (OUTPATIENT)
Dept: CARDIOLOGY CLINIC | Facility: CLINIC | Age: 66
End: 2021-03-23
Payer: COMMERCIAL

## 2021-03-23 VITALS
WEIGHT: 218 LBS | SYSTOLIC BLOOD PRESSURE: 140 MMHG | HEART RATE: 64 BPM | DIASTOLIC BLOOD PRESSURE: 80 MMHG | HEIGHT: 66 IN | BODY MASS INDEX: 35.03 KG/M2

## 2021-03-23 DIAGNOSIS — I35.9 AORTIC VALVE DISEASE: ICD-10-CM

## 2021-03-23 DIAGNOSIS — I10 ESSENTIAL (PRIMARY) HYPERTENSION: Primary | ICD-10-CM

## 2021-03-23 DIAGNOSIS — I51.7 LVH (LEFT VENTRICULAR HYPERTROPHY): ICD-10-CM

## 2021-03-23 PROCEDURE — 93000 ELECTROCARDIOGRAM COMPLETE: CPT | Performed by: INTERNAL MEDICINE

## 2021-03-23 PROCEDURE — 99214 OFFICE O/P EST MOD 30 MIN: CPT | Performed by: INTERNAL MEDICINE

## 2021-04-07 ENCOUNTER — OFFICE VISIT (OUTPATIENT)
Dept: INTERNAL MEDICINE CLINIC | Facility: CLINIC | Age: 66
End: 2021-04-07
Payer: COMMERCIAL

## 2021-04-07 VITALS
BODY MASS INDEX: 34.72 KG/M2 | TEMPERATURE: 97.7 F | OXYGEN SATURATION: 97 % | SYSTOLIC BLOOD PRESSURE: 129 MMHG | HEIGHT: 66 IN | WEIGHT: 216 LBS | DIASTOLIC BLOOD PRESSURE: 87 MMHG | HEART RATE: 81 BPM

## 2021-04-07 DIAGNOSIS — I10 ESSENTIAL HYPERTENSION: ICD-10-CM

## 2021-04-07 DIAGNOSIS — G43.109 MIGRAINE WITH AURA AND WITHOUT STATUS MIGRAINOSUS, NOT INTRACTABLE: Primary | ICD-10-CM

## 2021-04-07 DIAGNOSIS — E78.2 MIXED HYPERLIPIDEMIA: ICD-10-CM

## 2021-04-07 DIAGNOSIS — I77.819 AORTIC DILATATION (HCC): ICD-10-CM

## 2021-04-07 DIAGNOSIS — Z00.00 MEDICARE ANNUAL WELLNESS VISIT, INITIAL: ICD-10-CM

## 2021-04-07 DIAGNOSIS — I35.1 NONRHEUMATIC AORTIC (VALVE) INSUFFICIENCY: ICD-10-CM

## 2021-04-07 PROCEDURE — G0439 PPPS, SUBSEQ VISIT: HCPCS | Performed by: INTERNAL MEDICINE

## 2021-04-07 PROCEDURE — 99214 OFFICE O/P EST MOD 30 MIN: CPT | Performed by: INTERNAL MEDICINE

## 2021-04-07 RX ORDER — PROTRIPTYLINE HYDROCHLORIDE 5 MG/1
5 TABLET, FILM COATED ORAL
Qty: 30 TABLET | Refills: 2 | Status: SHIPPED | OUTPATIENT
Start: 2021-04-07 | End: 2021-07-08

## 2021-04-07 RX ORDER — ROSUVASTATIN CALCIUM 5 MG/1
5 TABLET, COATED ORAL DAILY
Qty: 30 TABLET | Refills: 2 | Status: SHIPPED | OUTPATIENT
Start: 2021-04-07 | End: 2021-07-06

## 2021-04-07 NOTE — PROGRESS NOTES
Assessment and Plan:     Problem List Items Addressed This Visit        Cardiovascular and Mediastinum    Nonrheumatic aortic (valve) insufficiency    Migraine with aura and without status migrainosus, not intractable      Other Visit Diagnoses     Medicare annual wellness visit, initial    -  Primary    Mixed hyperlipidemia        Essential hypertension               Preventive health issues were discussed with patient, and age appropriate screening tests were ordered as noted in patient's After Visit Summary  Personalized health advice and appropriate referrals for health education or preventive services given if needed, as noted in patient's After Visit Summary       History of Present Illness:     Patient presents for Medicare Annual Wellness visit    Patient Care Team:  Pollyann Hamman, MD as PCP - General (Internal Medicine)  Pollyann Hamman, MD as PCP - [address] [place] [address] ([facility])  Pollyann Hamman, MD Annamary Sole, MD     Problem List:     Patient Active Problem List   Diagnosis    Nonrheumatic aortic (valve) insufficiency    Sleep apnea    Vitamin B12 deficiency    Migraine with aura and without status migrainosus, not intractable      Past Medical and Surgical History:     Past Medical History:   Diagnosis Date    Allergic rhinitis     Aortic regurgitation     HTN (hypertension)     Hyperlipidemia     Nonrheumatic aortic (valve) insufficiency     Sleep apnea     Vitamin B12 deficiency      Past Surgical History:   Procedure Laterality Date    BUNIONECTOMY      CHOLECYSTECTOMY      COLONOSCOPY  07/02/2018    DILATION AND CURETTAGE OF UTERUS      MAMMO (HISTORICAL)  03/21/2017 5/18/2018 and 3/21/2017     WISDOM TOOTH EXTRACTION        Family History:     Family History   Problem Relation Age of Onset    Coronary artery disease Mother     Kidney cancer Father     No Known Problems Sister     No Known Problems Sister     No Known Problems Sister       Social History:        Social History     Socioeconomic History    Marital status: /Civil Union     Spouse name: None    Number of children: 2    Years of education: None    Highest education level: None   Occupational History    None   Social Needs    Financial resource strain: None    Food insecurity     Worry: None     Inability: None    Transportation needs     Medical: None     Non-medical: None   Tobacco Use    Smoking status: Former Smoker     Types: Cigarettes     Quit date:      Years since quittin 2    Smokeless tobacco: Never Used   Substance and Sexual Activity    Alcohol use: Yes     Frequency: 2-3 times a week    Drug use: None    Sexual activity: None   Lifestyle    Physical activity     Days per week: None     Minutes per session: None    Stress: None   Relationships    Social connections     Talks on phone: None     Gets together: None     Attends Episcopal service: None     Active member of club or organization: None     Attends meetings of clubs or organizations: None     Relationship status: None    Intimate partner violence     Fear of current or ex partner: None     Emotionally abused: None     Physically abused: None     Forced sexual activity: None   Other Topics Concern    None   Social History Narrative    2 daughters    Travel outside [address]: No       Medications and Allergies:     Current Outpatient Medications   Medication Sig Dispense Refill    Calcium 600 MG tablet Take 2 tablets by mouth daily      cyanocobalamin (VITAMIN B-12) 500 mcg tablet Take 1 tablet by mouth daily      labetalol (NORMODYNE) 100 mg tablet Take 1 tablet (100 mg total) by mouth 2 (two) times a day 180 tablet 1    Multiple Vitamins-Minerals (MULTIVITAMIN ADULT) TABS Take 1 tablet by mouth daily      protriptyline (VIVACTIL) 5 MG tablet Take 1 tablet (5 mg total) by mouth daily at bedtime 30 tablet 2    rosuvastatin (CRESTOR) 5 mg tablet Take 1 tablet (5 mg total) by mouth daily 30 tablet 2    trandolapril (MAVIK) 4 MG tablet Take 1 tablet (4 mg total) by mouth daily 90 tablet 1    verapamil (CALAN) 80 mg tablet Take 1 tablet (80 mg total) by mouth 3 (three) times a day 270 tablet 1     No current facility-administered medications for this visit  Allergies   Allergen Reactions    Penicillins Rash     Other reaction(s): PENICILLIN G POTASSIUM (PENICILLIN)        Immunizations:     Immunization History   Administered Date(s) Administered    INFLUENZA 12/10/2014, 10/01/2015, 10/10/2016    Influenza Split High Dose Preservative Free IM 09/08/2020    Influenza, high dose seasonal 0 7 mL 09/08/2020    Influenza, injectable, quadrivalent, preservative free 0 5 mL 09/30/2019    Pneumococcal Conjugate 13-Valent 03/10/2020    Pneumococcal Polysaccharide PPV23 12/15/2010    Tdap 05/20/2013    Zoster Vaccine Recombinant 04/14/2018    influenza, trivalent, adjuvanted 09/08/2020      Health Maintenance:         Topic Date Due    Hepatitis C Screening  Never done    Colonoscopy Surveillance  07/02/2021    MAMMOGRAM  11/27/2021    Colorectal Cancer Screening  07/02/2028         Topic Date Due    COVID-19 Vaccine (1) Never done    Pneumococcal Vaccine: 65+ Years (2 of 2 - PPSV23) 03/10/2021      Medicare Health Risk Assessment:     /87 (BP Location: Left arm, Patient Position: Sitting, Cuff Size: Standard)   Pulse 81   Temp 97 7 °F (36 5 °C) (Temporal)   Ht 5' 6" (1 676 m)   Wt 98 kg (216 lb)   SpO2 97%   BMI 34 86 kg/m²      Jossy Cortez is here for her Initial Wellness visit  Health Risk Assessment:   Patient rates overall health as good  Patient feels that their physical health rating is same  Patient is very satisfied with their life  Eyesight was rated as same  Hearing was rated as same  Patient feels that their emotional and mental health rating is same  Patients states they are never, rarely angry  Patient states they are never, rarely unusually tired/fatigued   Pain experienced in the last 7 days has been none  Patient states that she has experienced no weight loss or gain in last 6 months  Fall Risk Screening: In the past year, patient has experienced: no history of falling in past year      Urinary Incontinence Screening:   Patient has not leaked urine accidently in the last six months  Home Safety:  Patient does not have trouble with stairs inside or outside of their home  Patient has working smoke alarms and has working carbon monoxide detector  Home safety hazards include: none  Nutrition:   Current diet is Regular  Medications:   Patient is currently taking over-the-counter supplements  OTC medications include: see medication list  Patient is able to manage medications  Activities of Daily Living (ADLs)/Instrumental Activities of Daily Living (IADLs):   Walk and transfer into and out of bed and chair?: Yes  Dress and groom yourself?: Yes    Bathe or shower yourself?: Yes    Feed yourself? Yes  Do your laundry/housekeeping?: Yes  Manage your money, pay your bills and track your expenses?: Yes  Make your own meals?: Yes    Do your own shopping?: Yes    Previous Hospitalizations:   Any hospitalizations or ED visits within the last 12 months?: No      Advance Care Planning:   Living will: Yes    Durable POA for healthcare:  Yes    Advanced directive: Yes      PREVENTIVE SCREENINGS      Cardiovascular Screening:    General: Screening Not Indicated and History Lipid Disorder      Diabetes Screening:     General: Screening Current      Colorectal Cancer Screening:     General: Screening Current      Breast Cancer Screening:     General: Screening Current      Cervical Cancer Screening:    General: Screening Not Indicated      Lung Cancer Screening:     General: Screening Not Indicated    Screening, Brief Intervention, and Referral to Treatment (SBIRT)    Screening  Typical number of drinks in a day: 0  Typical number of drinks in a week: 2  Interpretation: Low risk drinking behavior  Single Item Drug Screening:  How often have you used an illegal drug (including marijuana) or a prescription medication for non-medical reasons in the past year? never    Single Item Drug Screen Score: 0  Interpretation: Negative screen for possible drug use disorder    Brief Intervention  Alcohol & drug use screenings were reviewed  No concerns regarding substance use disorder identified  Healthy alcohol use/limits discussed  Other Counseling Topics:   Car/seat belt/driving safety, skin self-exam, sunscreen and regular weightbearing exercise and calcium and vitamin D intake         Peng Gambino MD

## 2021-04-07 NOTE — PATIENT INSTRUCTIONS
Medicare Preventive Visit Patient Instructions  Thank you for completing your Welcome to Medicare Visit or Medicare Annual Wellness Visit today  Your next wellness visit will be due in one year (4/8/2022)  The screening/preventive services that you may require over the next 5-10 years are detailed below  Some tests may not apply to you based off risk factors and/or age  Screening tests ordered at today's visit but not completed yet may show as past due  Also, please note that scanned in results may not display below  Preventive Screenings:  Service Recommendations Previous Testing/Comments   Colorectal Cancer Screening  * Colonoscopy    * Fecal Occult Blood Test (FOBT)/Fecal Immunochemical Test (FIT)  * Fecal DNA/Cologuard Test  * Flexible Sigmoidoscopy Age: 54-65 years old   Colonoscopy: every 10 years (may be performed more frequently if at higher risk)  OR  FOBT/FIT: every 1 year  OR  Cologuard: every 3 years  OR  Sigmoidoscopy: every 5 years  Screening may be recommended earlier than age 48 if at higher risk for colorectal cancer  Also, an individualized decision between you and your healthcare provider will decide whether screening between the ages of 74-80 would be appropriate  Colonoscopy: 07/02/2018  FOBT/FIT: Not on file  Cologuard: Not on file  Sigmoidoscopy: Not on file    Screening Current     Breast Cancer Screening Age: 36 years old  Frequency: every 1-2 years  Not required if history of left and right mastectomy Mammogram: 11/27/2020    Screening Current   Cervical Cancer Screening Between the ages of 21-29, pap smear recommended once every 3 years  Between the ages of 33-67, can perform pap smear with HPV co-testing every 5 years     Recommendations may differ for women with a history of total hysterectomy, cervical cancer, or abnormal pap smears in past  Pap Smear: Not on file    Screening Not Indicated   Hepatitis C Screening Once for adults born between 1945 and 1965  More frequently in patients at high risk for Hepatitis C Hep C Antibody: Not on file        Diabetes Screening 1-2 times per year if you're at risk for diabetes or have pre-diabetes Fasting glucose: No results in last 5 years   A1C: No results in last 5 years    Screening Current   Cholesterol Screening Once every 5 years if you don't have a lipid disorder  May order more often based on risk factors  Lipid panel: 10/01/2020    Screening Not Indicated  History Lipid Disorder     Other Preventive Screenings Covered by Medicare:  1  Abdominal Aortic Aneurysm (AAA) Screening: covered once if your at risk  You're considered to be at risk if you have a family history of AAA  2  Lung Cancer Screening: covers low dose CT scan once per year if you meet all of the following conditions: (1) Age 50-69; (2) No signs or symptoms of lung cancer; (3) Current smoker or have quit smoking within the last 15 years; (4) You have a tobacco smoking history of at least 30 pack years (packs per day multiplied by number of years you smoked); (5) You get a written order from a healthcare provider  3  Glaucoma Screening: covered annually if you're considered high risk: (1) You have diabetes OR (2) Family history of glaucoma OR (3)  aged 48 and older OR (3)  American aged 72 and older  3  Osteoporosis Screening: covered every 2 years if you meet one of the following conditions: (1) You're estrogen deficient and at risk for osteoporosis based off medical history and other findings; (2) Have a vertebral abnormality; (3) On glucocorticoid therapy for more than 3 months; (4) Have primary hyperparathyroidism; (5) On osteoporosis medications and need to assess response to drug therapy  · Last bone density test (DXA Scan): Not on file  5  HIV Screening: covered annually if you're between the age of 12-76  Also covered annually if you are younger than 13 and older than 72 with risk factors for HIV infection   For pregnant patients, it is covered up to 3 times per pregnancy  Immunizations:  Immunization Recommendations   Influenza Vaccine Annual influenza vaccination during flu season is recommended for all persons aged >= 6 months who do not have contraindications   Pneumococcal Vaccine (Prevnar and Pneumovax)  * Prevnar = PCV13  * Pneumovax = PPSV23   Adults 25-60 years old: 1-3 doses may be recommended based on certain risk factors  Adults 72 years old: Prevnar (PCV13) vaccine recommended followed by Pneumovax (PPSV23) vaccine  If already received PPSV23 since turning 65, then PCV13 recommended at least one year after PPSV23 dose  Hepatitis B Vaccine 3 dose series if at intermediate or high risk (ex: diabetes, end stage renal disease, liver disease)   Tetanus (Td) Vaccine - COST NOT COVERED BY MEDICARE PART B Following completion of primary series, a booster dose should be given every 10 years to maintain immunity against tetanus  Td may also be given as tetanus wound prophylaxis  Tdap Vaccine - COST NOT COVERED BY MEDICARE PART B Recommended at least once for all adults  For pregnant patients, recommended with each pregnancy  Shingles Vaccine (Shingrix) - COST NOT COVERED BY MEDICARE PART B  2 shot series recommended in those aged 48 and above     Health Maintenance Due:      Topic Date Due    Hepatitis C Screening  Never done    Colonoscopy Surveillance  07/02/2021    MAMMOGRAM  11/27/2021    Colorectal Cancer Screening  07/02/2028     Immunizations Due:      Topic Date Due    COVID-19 Vaccine (1) Never done    Pneumococcal Vaccine: 65+ Years (2 of 2 - PPSV23) 03/10/2021     Advance Directives   What are advance directives? Advance directives are legal documents that state your wishes and plans for medical care  These plans are made ahead of time in case you lose your ability to make decisions for yourself  Advance directives can apply to any medical decision, such as the treatments you want, and if you want to donate organs  What are the types of advance directives? There are many types of advance directives, and each state has rules about how to use them  You may choose a combination of any of the following:  · Living will: This is a written record of the treatment you want  You can also choose which treatments you do not want, which to limit, and which to stop at a certain time  This includes surgery, medicine, IV fluid, and tube feedings  · Durable power of  for healthcare [facility]): This is a written record that states who you want to make healthcare choices for you when you are unable to make them for yourself  This person, called a proxy, is usually a family member or a friend  You may choose more than 1 proxy  · Do not resuscitate (DNR) order:  A DNR order is used in case your heart stops beating or you stop breathing  It is a request not to have certain forms of treatment, such as CPR  A DNR order may be included in other types of advance directives  · Medical directive: This covers the care that you want if you are in a coma, near death, or unable to make decisions for yourself  You can list the treatments you want for each condition  Treatment may include pain medicine, surgery, blood transfusions, dialysis, IV or tube feedings, and a ventilator (breathing machine)  · Values history: This document has questions about your views, beliefs, and how you feel and think about life  This information can help others choose the care that you would choose  Why are advance directives important? An advance directive helps you control your care  Although spoken wishes may be used, it is better to have your wishes written down  Spoken wishes can be misunderstood, or not followed  Treatments may be given even if you do not want them  An advance directive may make it easier for your family to make difficult choices about your care     Weight Management   Why it is important to manage your weight:  Being overweight increases your risk of health conditions such as heart disease, high blood pressure, type 2 diabetes, and certain types of cancer  It can also increase your risk for osteoarthritis, sleep apnea, and other respiratory problems  Aim for a slow, steady weight loss  Even a small amount of weight loss can lower your risk of health problems  How to lose weight safely:  A safe and healthy way to lose weight is to eat fewer calories and get regular exercise  You can lose up about 1 pound a week by decreasing the number of calories you eat by 500 calories each day  Healthy meal plan for weight management:  A healthy meal plan includes a variety of foods, contains fewer calories, and helps you stay healthy  A healthy meal plan includes the following:  · Eat whole-grain foods more often  A healthy meal plan should contain fiber  Fiber is the part of grains, fruits, and vegetables that is not broken down by your body  Whole-grain foods are healthy and provide extra fiber in your diet  Some examples of whole-grain foods are whole-wheat breads and pastas, oatmeal, brown rice, and bulgur  · Eat a variety of vegetables every day  Include dark, leafy greens such as spinach, kale, dary greens, and mustard greens  Eat yellow and orange vegetables such as carrots, sweet potatoes, and winter squash  · Eat a variety of fruits every day  Choose fresh or canned fruit (canned in its own juice or light syrup) instead of juice  Fruit juice has very little or no fiber  · Eat low-fat dairy foods  Drink fat-free (skim) milk or 1% milk  Eat fat-free yogurt and low-fat cottage cheese  Try low-fat cheeses such as mozzarella and other reduced-fat cheeses  · Choose meat and other protein foods that are low in fat  Choose beans or other legumes such as split peas or lentils  Choose fish, skinless poultry (chicken or turkey), or lean cuts of red meat (beef or pork)  Before you cook meat or poultry, cut off any visible fat     · Use less fat and oil   Try baking foods instead of frying them  Add less fat, such as margarine, sour cream, regular salad dressing and mayonnaise to foods  Eat fewer high-fat foods  Some examples of high-fat foods include french fries, doughnuts, ice cream, and cakes  · Eat fewer sweets  Limit foods and drinks that are high in sugar  This includes candy, cookies, regular soda, and sweetened drinks  Exercise:  Exercise at least 30 minutes per day on most days of the week  Some examples of exercise include walking, biking, dancing, and swimming  You can also fit in more physical activity by taking the stairs instead of the elevator or parking farther away from stores  Ask your healthcare provider about the best exercise plan for you  © Copyright [employer] 2018 Information is for End User's use only and may not be sold, redistributed or otherwise used for commercial purposes   All illustrations and images included in CareNotes® are the copyrighted property of A D A M , Inc  or [address] [place] [address]

## 2021-04-07 NOTE — PROGRESS NOTES
Assessment/Plan: This is a 65-year-old lady with a history of hypertension aortic stenosis and aortic regurgitation obesity hyperlipidemia allergies and migraines  She states that her migraines have improved since starting the protriptyline 10 mg at bedtime  She denies any chest pain or shortness of breath  Because of her aortic valve she is also following up with Cardiology  1  Migraine with aura and without status migrainosus, not intractable  Comments:  Migraines have improved continue protriptyline  Continue to monitor the symptoms and frequency  Orders:  -     protriptyline (VIVACTIL) 5 MG tablet; Take 1 tablet (5 mg total) by mouth daily at bedtime    2  Mixed hyperlipidemia  Comments:  She was advised to continue rosuvastatin  Labs were reviewed  Orders:  -     rosuvastatin (CRESTOR) 5 mg tablet; Take 1 tablet (5 mg total) by mouth daily    3  Medicare annual wellness visit, initial    4  Essential hypertension  Comments:  Blood pressure is under control continue antihypertensive medications  Orders:  -     CBC and differential; Future  -     Comprehensive metabolic panel; Future  -     Lipid panel; Future  -     UA (URINE) with reflex to Scope  -     TSH, 3rd generation; Future    5  Nonrheumatic aortic (valve) insufficiency    6  Aortic dilatation (HCC)           1  Migraine with aura and without status migrainosus, not intractable    - protriptyline (VIVACTIL) 5 MG tablet; Take 1 tablet (5 mg total) by mouth daily at bedtime  Dispense: 30 tablet; Refill: 2    2  Mixed hyperlipidemia    - rosuvastatin (CRESTOR) 5 mg tablet; Take 1 tablet (5 mg total) by mouth daily  Dispense: 30 tablet; Refill: 2    3  Medicare annual wellness visit, initial      4  Essential hypertension      5  Nonrheumatic aortic (valve) insufficiency             Subjective:      Patient ID: Radha Williamson is a 76 y o  female      This is a 65-year-old lady with a history of hypertension aortic stenosis and aortic regurgitation obesity hyperlipidemia allergies and migraines  She states that her migraines have improved since starting the protriptyline 10 mg at bedtime  She denies any chest pain or shortness of breath  Because of her aortic valve she is also following up with Cardiology  The following portions of the patient's history were reviewed and updated as appropriate: She  has a past medical history of Allergic rhinitis, Aortic regurgitation, HTN (hypertension), Hyperlipidemia, Nonrheumatic aortic (valve) insufficiency, Sleep apnea, and Vitamin B12 deficiency  She   Patient Active Problem List    Diagnosis Date Noted    Migraine with aura and without status migrainosus, not intractable 04/07/2021    Aortic dilatation ([facility] [address] ) 04/07/2021    Nonrheumatic aortic (valve) insufficiency     Sleep apnea     Vitamin B12 deficiency      She  has a past surgical history that includes Dilation and curettage of uterus; [place] tooth extraction; Bunionectomy; Cholecystectomy; Colonoscopy (07/02/2018); and Mammo (historical) (03/21/2017)  Her family history includes Coronary artery disease in her mother; Kidney cancer in her father; No Known Problems in her sister, sister, and sister  She  reports that she quit smoking about 16 years ago  Her smoking use included cigarettes  She has never used smokeless tobacco  She reports current alcohol use  No history on file for drug    Current Outpatient Medications   Medication Sig Dispense Refill    Calcium 600 MG tablet Take 2 tablets by mouth daily      cyanocobalamin (VITAMIN B-12) 500 mcg tablet Take 1 tablet by mouth daily      labetalol (NORMODYNE) 100 mg tablet Take 1 tablet (100 mg total) by mouth 2 (two) times a day 180 tablet 1    Multiple Vitamins-Minerals (MULTIVITAMIN ADULT) TABS Take 1 tablet by mouth daily      protriptyline (VIVACTIL) 5 MG tablet Take 1 tablet (5 mg total) by mouth daily at bedtime 30 tablet 2    rosuvastatin (CRESTOR) 5 mg tablet Take 1 tablet (5 mg total) by mouth daily 30 tablet 2    trandolapril (MAVIK) 4 MG tablet Take 1 tablet (4 mg total) by mouth daily 90 tablet 1    verapamil (CALAN) 80 mg tablet Take 1 tablet (80 mg total) by mouth 3 (three) times a day 270 tablet 1     No current facility-administered medications for this visit  Current Outpatient Medications on File Prior to Visit   Medication Sig    Calcium 600 MG tablet Take 2 tablets by mouth daily    cyanocobalamin (VITAMIN B-12) 500 mcg tablet Take 1 tablet by mouth daily    labetalol (NORMODYNE) 100 mg tablet Take 1 tablet (100 mg total) by mouth 2 (two) times a day    Multiple Vitamins-Minerals (MULTIVITAMIN ADULT) TABS Take 1 tablet by mouth daily    trandolapril (MAVIK) 4 MG tablet Take 1 tablet (4 mg total) by mouth daily    verapamil (CALAN) 80 mg tablet Take 1 tablet (80 mg total) by mouth 3 (three) times a day     No current facility-administered medications on file prior to visit  She is allergic to penicillins       Review of Systems   Constitutional: Negative for appetite change, chills, fatigue and fever  HENT: Negative for sore throat and trouble swallowing  Eyes: Negative for redness  Respiratory: Negative for shortness of breath  Cardiovascular: Negative for chest pain and palpitations  Gastrointestinal: Negative for abdominal pain, constipation and diarrhea  Genitourinary: Negative for dysuria and hematuria  Musculoskeletal: Negative for back pain and neck pain  Skin: Negative for rash  Neurological: Negative for seizures, weakness and headaches  Hematological: Negative for adenopathy  Psychiatric/Behavioral: Negative for confusion  The patient is not nervous/anxious            Objective:      /87 (BP Location: Left arm, Patient Position: Sitting, Cuff Size: Standard)   Pulse 81   Temp 97 7 °F (36 5 °C) (Temporal)   Ht 5' 6" (1 676 m)   Wt 98 kg (216 lb)   SpO2 97%   BMI 34 86 kg/m²     No results found for this or any previous visit (from the past 1344 hour(s))  Physical Exam  Constitutional:       General: She is not in acute distress  Appearance: Normal appearance  HENT:      Head: Normocephalic and atraumatic  Nose: Nose normal       Mouth/Throat:      Mouth: Mucous membranes are moist    Eyes:      Extraocular Movements: Extraocular movements intact  Pupils: Pupils are equal, round, and reactive to light  Neck:      Musculoskeletal: Normal range of motion  Cardiovascular:      Rate and Rhythm: Normal rate and regular rhythm  Pulses: Normal pulses  Heart sounds: Normal heart sounds  No murmur  No friction rub  Pulmonary:      Effort: Pulmonary effort is normal  No respiratory distress  Breath sounds: Normal breath sounds  No wheezing  Abdominal:      General: Abdomen is flat  Bowel sounds are normal  There is no distension  Palpations: Abdomen is soft  There is no mass  Tenderness: There is no abdominal tenderness  There is no guarding  Musculoskeletal: Normal range of motion  Skin:     General: Skin is warm and dry  Neurological:      General: No focal deficit present  Mental Status: She is alert and oriented to person, place, and time  Mental status is at baseline  Cranial Nerves: No cranial nerve deficit     Psychiatric:         Mood and Affect: Mood normal          Behavior: Behavior normal

## 2021-05-26 DIAGNOSIS — I10 ESSENTIAL HYPERTENSION: ICD-10-CM

## 2021-05-26 RX ORDER — LABETALOL 100 MG/1
100 TABLET, FILM COATED ORAL 2 TIMES DAILY
Qty: 180 TABLET | Refills: 1 | Status: SHIPPED | OUTPATIENT
Start: 2021-05-26 | End: 2021-12-20 | Stop reason: SDUPTHER

## 2021-07-06 DIAGNOSIS — E78.2 MIXED HYPERLIPIDEMIA: ICD-10-CM

## 2021-07-06 DIAGNOSIS — I10 ESSENTIAL HYPERTENSION: ICD-10-CM

## 2021-07-06 RX ORDER — ROSUVASTATIN CALCIUM 5 MG/1
TABLET, COATED ORAL
Qty: 30 TABLET | Refills: 2 | Status: SHIPPED | OUTPATIENT
Start: 2021-07-06 | End: 2021-10-06

## 2021-07-06 RX ORDER — VERAPAMIL HYDROCHLORIDE 80 MG/1
TABLET ORAL
Qty: 270 TABLET | Refills: 1 | Status: SHIPPED | OUTPATIENT
Start: 2021-07-06 | End: 2021-12-20 | Stop reason: SDUPTHER

## 2021-07-08 DIAGNOSIS — G43.109 MIGRAINE WITH AURA AND WITHOUT STATUS MIGRAINOSUS, NOT INTRACTABLE: ICD-10-CM

## 2021-07-08 DIAGNOSIS — I10 ESSENTIAL HYPERTENSION: ICD-10-CM

## 2021-07-08 RX ORDER — PROTRIPTYLINE HYDROCHLORIDE 5 MG/1
TABLET, FILM COATED ORAL
Qty: 30 TABLET | Refills: 2 | Status: SHIPPED | OUTPATIENT
Start: 2021-07-08 | End: 2021-10-07 | Stop reason: SDUPTHER

## 2021-07-08 RX ORDER — TRANDOLAPRIL TABLETS 4 MG/1
TABLET ORAL
Qty: 90 TABLET | Refills: 1 | Status: SHIPPED | OUTPATIENT
Start: 2021-07-08 | End: 2021-12-20 | Stop reason: SDUPTHER

## 2021-07-21 LAB
ALBUMIN SERPL-MCNC: 4.2 G/DL (ref 3.6–5.1)
ALBUMIN/GLOB SERPL: 2 (CALC) (ref 1–2.5)
ALP SERPL-CCNC: 61 U/L (ref 37–153)
ALT SERPL-CCNC: 10 U/L (ref 6–29)
APPEARANCE UR: CLEAR
AST SERPL-CCNC: 17 U/L (ref 10–35)
BACTERIA UR QL AUTO: ABNORMAL /HPF
BASOPHILS # BLD AUTO: 32 CELLS/UL (ref 0–200)
BASOPHILS NFR BLD AUTO: 0.6 %
BILIRUB SERPL-MCNC: 0.5 MG/DL (ref 0.2–1.2)
BILIRUB UR QL STRIP: NEGATIVE
BUN SERPL-MCNC: 22 MG/DL (ref 7–25)
BUN/CREAT SERPL: ABNORMAL (CALC) (ref 6–22)
CALCIUM SERPL-MCNC: 9.1 MG/DL (ref 8.6–10.4)
CHLORIDE SERPL-SCNC: 107 MMOL/L (ref 98–110)
CHOLEST SERPL-MCNC: 162 MG/DL
CHOLEST/HDLC SERPL: 2.2 (CALC)
CO2 SERPL-SCNC: 23 MMOL/L (ref 20–32)
COLOR UR: YELLOW
CREAT SERPL-MCNC: 0.94 MG/DL (ref 0.5–0.99)
EOSINOPHIL # BLD AUTO: 108 CELLS/UL (ref 15–500)
EOSINOPHIL NFR BLD AUTO: 2 %
ERYTHROCYTE [DISTWIDTH] IN BLOOD BY AUTOMATED COUNT: 14.5 % (ref 11–15)
GLOBULIN SER CALC-MCNC: 2.1 G/DL (CALC) (ref 1.9–3.7)
GLUCOSE SERPL-MCNC: 100 MG/DL (ref 65–99)
GLUCOSE UR QL STRIP: NEGATIVE
HCT VFR BLD AUTO: 42.7 % (ref 35–45)
HDLC SERPL-MCNC: 73 MG/DL
HGB BLD-MCNC: 14.3 G/DL (ref 11.7–15.5)
HGB UR QL STRIP: NEGATIVE
HYALINE CASTS #/AREA URNS LPF: ABNORMAL /LPF
KETONES UR QL STRIP: NEGATIVE
LDLC SERPL CALC-MCNC: 69 MG/DL (CALC)
LEUKOCYTE ESTERASE UR QL STRIP: ABNORMAL
LYMPHOCYTES # BLD AUTO: 1669 CELLS/UL (ref 850–3900)
LYMPHOCYTES NFR BLD AUTO: 30.9 %
MCH RBC QN AUTO: 30.6 PG (ref 27–33)
MCHC RBC AUTO-ENTMCNC: 33.5 G/DL (ref 32–36)
MCV RBC AUTO: 91.4 FL (ref 80–100)
MONOCYTES # BLD AUTO: 362 CELLS/UL (ref 200–950)
MONOCYTES NFR BLD AUTO: 6.7 %
NEUTROPHILS # BLD AUTO: 3229 CELLS/UL (ref 1500–7800)
NEUTROPHILS NFR BLD AUTO: 59.8 %
NITRITE UR QL STRIP: NEGATIVE
NONHDLC SERPL-MCNC: 89 MG/DL (CALC)
PH UR STRIP: 6 [PH] (ref 5–8)
PLATELET # BLD AUTO: 180 THOUSAND/UL (ref 140–400)
PMV BLD REES-ECKER: 11.9 FL (ref 7.5–12.5)
POTASSIUM SERPL-SCNC: 4.2 MMOL/L (ref 3.5–5.3)
PROT SERPL-MCNC: 6.3 G/DL (ref 6.1–8.1)
PROT UR QL STRIP: NEGATIVE
RBC # BLD AUTO: 4.67 MILLION/UL (ref 3.8–5.1)
RBC #/AREA URNS HPF: ABNORMAL /HPF
SL AMB EGFR AFRICAN AMERICAN: 73 ML/MIN/1.73M2
SL AMB EGFR NON AFRICAN AMERICAN: 63 ML/MIN/1.73M2
SODIUM SERPL-SCNC: 139 MMOL/L (ref 135–146)
SP GR UR STRIP: 1.01 (ref 1–1.03)
SQUAMOUS #/AREA URNS HPF: ABNORMAL /HPF
TRIGL SERPL-MCNC: 122 MG/DL
TSH SERPL-ACNC: 2.26 MIU/L (ref 0.4–4.5)
WBC # BLD AUTO: 5.4 THOUSAND/UL (ref 3.8–10.8)
WBC #/AREA URNS HPF: ABNORMAL /HPF

## 2021-07-29 ENCOUNTER — OFFICE VISIT (OUTPATIENT)
Dept: LAB | Facility: CLINIC | Age: 66
End: 2021-07-29
Payer: COMMERCIAL

## 2021-07-29 ENCOUNTER — APPOINTMENT (OUTPATIENT)
Dept: LAB | Facility: CLINIC | Age: 66
End: 2021-07-29
Payer: COMMERCIAL

## 2021-07-29 ENCOUNTER — HOSPITAL ENCOUNTER (OUTPATIENT)
Dept: RADIOLOGY | Facility: HOSPITAL | Age: 66
Discharge: HOME | End: 2021-07-29
Attending: SURGERY
Payer: COMMERCIAL

## 2021-07-29 DIAGNOSIS — D12.1 BENIGN NEOPLASM OF APPENDIX: ICD-10-CM

## 2021-07-29 DIAGNOSIS — K38.8 APPENDICEAL COLIC: ICD-10-CM

## 2021-07-29 DIAGNOSIS — Z01.818 ENCOUNTER FOR PREADMISSION TESTING: ICD-10-CM

## 2021-07-29 LAB
ALBUMIN SERPL BCP-MCNC: 4.2 G/DL (ref 3.5–5)
ALP SERPL-CCNC: 80 U/L (ref 46–116)
ALT SERPL W P-5'-P-CCNC: 18 U/L (ref 12–78)
ANION GAP SERPL CALCULATED.3IONS-SCNC: 10 MMOL/L (ref 4–13)
AST SERPL W P-5'-P-CCNC: 18 U/L (ref 5–45)
BACTERIA UR QL AUTO: NORMAL /HPF
BASOPHILS # BLD AUTO: 0.03 THOUSANDS/ΜL (ref 0–0.1)
BASOPHILS NFR BLD AUTO: 0 % (ref 0–1)
BILIRUB SERPL-MCNC: 0.47 MG/DL (ref 0.2–1)
BILIRUB UR QL STRIP: NEGATIVE
BUN SERPL-MCNC: 21 MG/DL (ref 5–25)
CALCIUM SERPL-MCNC: 9.1 MG/DL (ref 8.3–10.1)
CHLORIDE SERPL-SCNC: 104 MMOL/L (ref 100–108)
CLARITY UR: CLEAR
CO2 SERPL-SCNC: 24 MMOL/L (ref 21–32)
COLOR UR: YELLOW
CREAT SERPL-MCNC: 1.18 MG/DL (ref 0.6–1.3)
EOSINOPHIL # BLD AUTO: 0.1 THOUSAND/ΜL (ref 0–0.61)
EOSINOPHIL NFR BLD AUTO: 1 % (ref 0–6)
ERYTHROCYTE [DISTWIDTH] IN BLOOD BY AUTOMATED COUNT: 15.8 % (ref 11.6–15.1)
GFR SERPL CREATININE-BSD FRML MDRD: 48 ML/MIN/1.73SQ M
GLUCOSE P FAST SERPL-MCNC: 103 MG/DL (ref 65–99)
GLUCOSE UR STRIP-MCNC: NEGATIVE MG/DL
HCT VFR BLD AUTO: 47 % (ref 34.8–46.1)
HGB BLD-MCNC: 15.1 G/DL (ref 11.5–15.4)
HGB UR QL STRIP.AUTO: NEGATIVE
HYALINE CASTS #/AREA URNS LPF: NORMAL /LPF
IMM GRANULOCYTES # BLD AUTO: 0.03 THOUSAND/UL (ref 0–0.2)
IMM GRANULOCYTES NFR BLD AUTO: 0 % (ref 0–2)
KETONES UR STRIP-MCNC: NEGATIVE MG/DL
LEUKOCYTE ESTERASE UR QL STRIP: ABNORMAL
LYMPHOCYTES # BLD AUTO: 2.15 THOUSANDS/ΜL (ref 0.6–4.47)
LYMPHOCYTES NFR BLD AUTO: 28 % (ref 14–44)
MCH RBC QN AUTO: 30.3 PG (ref 26.8–34.3)
MCHC RBC AUTO-ENTMCNC: 32.1 G/DL (ref 31.4–37.4)
MCV RBC AUTO: 94 FL (ref 82–98)
MONOCYTES # BLD AUTO: 0.42 THOUSAND/ΜL (ref 0.17–1.22)
MONOCYTES NFR BLD AUTO: 6 % (ref 4–12)
NEUTROPHILS # BLD AUTO: 4.92 THOUSANDS/ΜL (ref 1.85–7.62)
NEUTS SEG NFR BLD AUTO: 65 % (ref 43–75)
NITRITE UR QL STRIP: NEGATIVE
NON-SQ EPI CELLS URNS QL MICRO: NORMAL /HPF
NRBC BLD AUTO-RTO: 0 /100 WBCS
PH UR STRIP.AUTO: 5 [PH]
PLATELET # BLD AUTO: 197 THOUSANDS/UL (ref 149–390)
PMV BLD AUTO: 11.7 FL (ref 8.9–12.7)
POTASSIUM SERPL-SCNC: 4.3 MMOL/L (ref 3.5–5.3)
PROT SERPL-MCNC: 7.4 G/DL (ref 6.4–8.2)
PROT UR STRIP-MCNC: NEGATIVE MG/DL
RBC # BLD AUTO: 4.99 MILLION/UL (ref 3.81–5.12)
RBC #/AREA URNS AUTO: NORMAL /HPF
SODIUM SERPL-SCNC: 138 MMOL/L (ref 136–145)
SP GR UR STRIP.AUTO: 1.01 (ref 1–1.03)
UROBILINOGEN UR QL STRIP.AUTO: 0.2 E.U./DL
WBC # BLD AUTO: 7.65 THOUSAND/UL (ref 4.31–10.16)
WBC #/AREA URNS AUTO: NORMAL /HPF

## 2021-07-29 PROCEDURE — 80053 COMPREHEN METABOLIC PANEL: CPT

## 2021-07-29 PROCEDURE — 36415 COLL VENOUS BLD VENIPUNCTURE: CPT

## 2021-07-29 PROCEDURE — 71046 X-RAY EXAM CHEST 2 VIEWS: CPT

## 2021-07-29 PROCEDURE — 93005 ELECTROCARDIOGRAM TRACING: CPT

## 2021-07-29 PROCEDURE — 81001 URINALYSIS AUTO W/SCOPE: CPT | Performed by: INTERNAL MEDICINE

## 2021-07-29 PROCEDURE — 85025 COMPLETE CBC W/AUTO DIFF WBC: CPT

## 2021-07-31 ENCOUNTER — ANESTHESIA EVENT (OUTPATIENT)
Dept: PERIOP | Facility: HOSPITAL | Age: 66
End: 2021-07-31
Payer: COMMERCIAL

## 2021-07-31 NOTE — ANESTHESIA PREPROCEDURE EVALUATION
Procedure:  APPENDECTOMY LAPAROSCOPIC (N/A Abdomen)    Relevant Problems   ANESTHESIA (within normal limits)   (-) History of anesthesia complications      CARDIO   (+) Aortic dilatation (HCC)   (+) HTN (hypertension)   (+) Hyperlipidemia   (+) Nonrheumatic aortic (valve) insufficiency      ENDO (within normal limits)      GI/HEPATIC (within normal limits)      /RENAL (within normal limits)      HEMATOLOGY (within normal limits)      MUSCULOSKELETAL (within normal limits)      NEURO/PSYCH (within normal limits)      PULMONARY   (+) Sleep apnea      Other   (+) Obesity (BMI 30 0-34  9)        Physical Exam    Airway    Mallampati score: II  TM Distance: >3 FB  Neck ROM: full     Dental   No notable dental hx     Cardiovascular  Rhythm: regular, Rate: normal, Murmur,     Pulmonary  Pulmonary exam normal Breath sounds clear to auscultation,     Other Findings        Anesthesia Plan  ASA Score- 2     Anesthesia Type- general with ASA Monitors  Additional Monitors:   Airway Plan: ETT  Plan Factors-Exercise tolerance (METS): >4 METS  Chart reviewed  EKG reviewed  Imaging results reviewed  Existing labs reviewed  Patient summary reviewed  Patient is not a current smoker  Patient did not smoke on day of surgery  Induction- intravenous  Postoperative Plan-   Planned trial extubation    Informed Consent- Anesthetic plan and risks discussed with patient  I personally reviewed this patient with the CRNA  Discussed and agreed on the Anesthesia Plan with the CRNA  Rosella Goldmann Echo (2/22/21):  LEFT VENTRICLE:  Systolic function was normal  Ejection fraction was estimated to be 60 %  There were no regional wall motion abnormalities  Wall thickness was at the upper limits of normal   Doppler parameters were consistent with abnormal left ventricular relaxation (grade 1 diastolic dysfunction)      AORTIC VALVE:  The valve was trileaflet   Leaflets exhibited mildly to moderately increased thickness and normal cuspal separation  There was moderate regurgitation      TRICUSPID VALVE:  There was trace regurgitation      PULMONIC VALVE:  There was trace regurgitation      AORTA:  The root exhibited normal size  The ascending aorta maximal AP dimension was 40 mm  NPO and allergies verified  Patient received SQH preoperatively as ordered by the surgeon  Patient took labetalol, rosuvastatin, trandolapril, and verapamil this AM, 8/2/21  PO Tylenol given preoperatively  Plan:  GETA    Risks and benefits of general anesthesia were discussed with the patient  Discussed risks of anesthesia including, but not limited to, the risk of dental injury, n/v, sore throat, corneal abrasions, and arrhythmias  All questions were answered  Anesthesia consent was obtained from the patient

## 2021-08-02 ENCOUNTER — HOSPITAL ENCOUNTER (OUTPATIENT)
Facility: HOSPITAL | Age: 66
Setting detail: OUTPATIENT SURGERY
Discharge: HOME | End: 2021-08-02
Attending: SURGERY | Admitting: SURGERY
Payer: COMMERCIAL

## 2021-08-02 ENCOUNTER — ANESTHESIA (OUTPATIENT)
Dept: PERIOP | Facility: HOSPITAL | Age: 66
End: 2021-08-02
Payer: COMMERCIAL

## 2021-08-02 VITALS
HEART RATE: 81 BPM | OXYGEN SATURATION: 95 % | RESPIRATION RATE: 16 BRPM | DIASTOLIC BLOOD PRESSURE: 83 MMHG | WEIGHT: 210 LBS | HEIGHT: 66 IN | SYSTOLIC BLOOD PRESSURE: 144 MMHG | BODY MASS INDEX: 33.75 KG/M2 | TEMPERATURE: 96.8 F

## 2021-08-02 DIAGNOSIS — D12.1 BENIGN NEOPLASM OF APPENDIX: ICD-10-CM

## 2021-08-02 DIAGNOSIS — K38.8 OTHER SPECIFIED DISEASES OF APPENDIX: ICD-10-CM

## 2021-08-02 LAB
ATRIAL RATE: 65 BPM
P AXIS: 16 DEGREES
PR INTERVAL: 154 MS
QRS AXIS: -26 DEGREES
QRSD INTERVAL: 94 MS
QT INTERVAL: 448 MS
QTC INTERVAL: 465 MS
T WAVE AXIS: 8 DEGREES
VENTRICULAR RATE: 65 BPM

## 2021-08-02 PROCEDURE — 93010 ELECTROCARDIOGRAM REPORT: CPT | Performed by: INTERNAL MEDICINE

## 2021-08-02 PROCEDURE — 88305 TISSUE EXAM BY PATHOLOGIST: CPT | Performed by: PATHOLOGY

## 2021-08-02 PROCEDURE — 44970 LAPAROSCOPY APPENDECTOMY: CPT | Performed by: PHYSICIAN ASSISTANT

## 2021-08-02 RX ORDER — LIDOCAINE HYDROCHLORIDE 10 MG/ML
0.5 INJECTION, SOLUTION EPIDURAL; INFILTRATION; INTRACAUDAL; PERINEURAL ONCE AS NEEDED
Status: DISCONTINUED | OUTPATIENT
Start: 2021-08-02 | End: 2021-08-02 | Stop reason: HOSPADM

## 2021-08-02 RX ORDER — BUPIVACAINE HYDROCHLORIDE 5 MG/ML
INJECTION, SOLUTION PERINEURAL AS NEEDED
Status: DISCONTINUED | OUTPATIENT
Start: 2021-08-02 | End: 2021-08-02 | Stop reason: HOSPADM

## 2021-08-02 RX ORDER — FENTANYL CITRATE/PF 50 MCG/ML
25 SYRINGE (ML) INJECTION
Status: DISCONTINUED | OUTPATIENT
Start: 2021-08-02 | End: 2021-08-02 | Stop reason: HOSPADM

## 2021-08-02 RX ORDER — HEPARIN SODIUM 5000 [USP'U]/ML
5000 INJECTION, SOLUTION INTRAVENOUS; SUBCUTANEOUS
Status: COMPLETED | OUTPATIENT
Start: 2021-08-02 | End: 2021-08-02

## 2021-08-02 RX ORDER — ACETAMINOPHEN 325 MG/1
975 TABLET ORAL ONCE
Status: COMPLETED | OUTPATIENT
Start: 2021-08-02 | End: 2021-08-02

## 2021-08-02 RX ORDER — CEFAZOLIN SODIUM 2 G/50ML
2000 SOLUTION INTRAVENOUS ONCE
Status: COMPLETED | OUTPATIENT
Start: 2021-08-02 | End: 2021-08-02

## 2021-08-02 RX ORDER — HYDROMORPHONE HCL/PF 1 MG/ML
0.25 SYRINGE (ML) INJECTION
Status: DISCONTINUED | OUTPATIENT
Start: 2021-08-02 | End: 2021-08-02 | Stop reason: HOSPADM

## 2021-08-02 RX ORDER — ONDANSETRON 2 MG/ML
4 INJECTION INTRAMUSCULAR; INTRAVENOUS ONCE AS NEEDED
Status: DISCONTINUED | OUTPATIENT
Start: 2021-08-02 | End: 2021-08-02 | Stop reason: HOSPADM

## 2021-08-02 RX ORDER — PROMETHAZINE HYDROCHLORIDE 25 MG/ML
12.5 INJECTION, SOLUTION INTRAMUSCULAR; INTRAVENOUS ONCE AS NEEDED
Status: DISCONTINUED | OUTPATIENT
Start: 2021-08-02 | End: 2021-08-02 | Stop reason: HOSPADM

## 2021-08-02 RX ORDER — ROCURONIUM BROMIDE 10 MG/ML
INJECTION, SOLUTION INTRAVENOUS AS NEEDED
Status: DISCONTINUED | OUTPATIENT
Start: 2021-08-02 | End: 2021-08-02

## 2021-08-02 RX ORDER — FENTANYL CITRATE 50 UG/ML
INJECTION, SOLUTION INTRAMUSCULAR; INTRAVENOUS AS NEEDED
Status: DISCONTINUED | OUTPATIENT
Start: 2021-08-02 | End: 2021-08-02

## 2021-08-02 RX ORDER — SODIUM CHLORIDE, SODIUM LACTATE, POTASSIUM CHLORIDE, CALCIUM CHLORIDE 600; 310; 30; 20 MG/100ML; MG/100ML; MG/100ML; MG/100ML
100 INJECTION, SOLUTION INTRAVENOUS CONTINUOUS
Status: DISCONTINUED | OUTPATIENT
Start: 2021-08-02 | End: 2021-08-02 | Stop reason: HOSPADM

## 2021-08-02 RX ORDER — ONDANSETRON 2 MG/ML
INJECTION INTRAMUSCULAR; INTRAVENOUS AS NEEDED
Status: DISCONTINUED | OUTPATIENT
Start: 2021-08-02 | End: 2021-08-02

## 2021-08-02 RX ORDER — SODIUM CHLORIDE, SODIUM LACTATE, POTASSIUM CHLORIDE, CALCIUM CHLORIDE 600; 310; 30; 20 MG/100ML; MG/100ML; MG/100ML; MG/100ML
75 INJECTION, SOLUTION INTRAVENOUS CONTINUOUS
Status: DISCONTINUED | OUTPATIENT
Start: 2021-08-02 | End: 2021-08-02 | Stop reason: HOSPADM

## 2021-08-02 RX ORDER — SODIUM CHLORIDE 9 MG/ML
INJECTION, SOLUTION INTRAVENOUS AS NEEDED
Status: DISCONTINUED | OUTPATIENT
Start: 2021-08-02 | End: 2021-08-02 | Stop reason: HOSPADM

## 2021-08-02 RX ORDER — DEXAMETHASONE SODIUM PHOSPHATE 10 MG/ML
INJECTION, SOLUTION INTRAMUSCULAR; INTRAVENOUS AS NEEDED
Status: DISCONTINUED | OUTPATIENT
Start: 2021-08-02 | End: 2021-08-02

## 2021-08-02 RX ORDER — LIDOCAINE HYDROCHLORIDE 10 MG/ML
INJECTION, SOLUTION EPIDURAL; INFILTRATION; INTRACAUDAL; PERINEURAL AS NEEDED
Status: DISCONTINUED | OUTPATIENT
Start: 2021-08-02 | End: 2021-08-02

## 2021-08-02 RX ORDER — PROPOFOL 10 MG/ML
INJECTION, EMULSION INTRAVENOUS AS NEEDED
Status: DISCONTINUED | OUTPATIENT
Start: 2021-08-02 | End: 2021-08-02

## 2021-08-02 RX ORDER — OXYCODONE HYDROCHLORIDE AND ACETAMINOPHEN 5; 325 MG/1; MG/1
1 TABLET ORAL EVERY 4 HOURS PRN
Qty: 20 TABLET | Refills: 0 | Status: SHIPPED | OUTPATIENT
Start: 2021-08-02 | End: 2021-08-12

## 2021-08-02 RX ORDER — MIDAZOLAM HYDROCHLORIDE 2 MG/2ML
INJECTION, SOLUTION INTRAMUSCULAR; INTRAVENOUS AS NEEDED
Status: DISCONTINUED | OUTPATIENT
Start: 2021-08-02 | End: 2021-08-02

## 2021-08-02 RX ADMIN — METRONIDAZOLE 500 MG: 500 INJECTION, SOLUTION INTRAVENOUS at 12:51

## 2021-08-02 RX ADMIN — CEFAZOLIN SODIUM 2000 MG: 2 SOLUTION INTRAVENOUS at 12:46

## 2021-08-02 RX ADMIN — FENTANYL CITRATE 50 MCG: 50 INJECTION INTRAMUSCULAR; INTRAVENOUS at 12:54

## 2021-08-02 RX ADMIN — Medication 25 MCG: at 13:37

## 2021-08-02 RX ADMIN — DEXAMETHASONE SODIUM PHOSPHATE 10 MG: 10 INJECTION, SOLUTION INTRAMUSCULAR; INTRAVENOUS at 12:50

## 2021-08-02 RX ADMIN — PROPOFOL 200 MG: 10 INJECTION, EMULSION INTRAVENOUS at 12:40

## 2021-08-02 RX ADMIN — SUGAMMADEX 400 MG: 100 INJECTION, SOLUTION INTRAVENOUS at 13:17

## 2021-08-02 RX ADMIN — FENTANYL CITRATE 50 MCG: 50 INJECTION INTRAMUSCULAR; INTRAVENOUS at 12:40

## 2021-08-02 RX ADMIN — Medication 25 MCG: at 13:44

## 2021-08-02 RX ADMIN — MIDAZOLAM 2 MG: 1 INJECTION INTRAMUSCULAR; INTRAVENOUS at 12:30

## 2021-08-02 RX ADMIN — HEPARIN SODIUM 5000 UNITS: 5000 INJECTION INTRAVENOUS; SUBCUTANEOUS at 10:36

## 2021-08-02 RX ADMIN — ONDANSETRON 4 MG: 2 INJECTION INTRAMUSCULAR; INTRAVENOUS at 13:13

## 2021-08-02 RX ADMIN — SODIUM CHLORIDE, SODIUM LACTATE, POTASSIUM CHLORIDE, AND CALCIUM CHLORIDE 100 ML/HR: .6; .31; .03; .02 INJECTION, SOLUTION INTRAVENOUS at 10:38

## 2021-08-02 RX ADMIN — LIDOCAINE HYDROCHLORIDE 50 MG: 10 INJECTION, SOLUTION EPIDURAL; INFILTRATION; INTRACAUDAL; PERINEURAL at 12:40

## 2021-08-02 RX ADMIN — ACETAMINOPHEN 975 MG: 325 TABLET, FILM COATED ORAL at 10:35

## 2021-08-02 RX ADMIN — ROCURONIUM BROMIDE 50 MG: 50 INJECTION, SOLUTION INTRAVENOUS at 12:41

## 2021-08-02 NOTE — OP NOTE
Operative note:    Procedure:  Laparoscopic appendectomy/partial cecectomy    Preoperative diagnosis:  Adenomatous lesion within appendix    Postoperative diagnosis:  Same    Surgeon:  Nicho Estrada MD    Assistant:  Festus Rodas PA-C No qualified resident was available to assist in this case  The assistant was required for retraction and tying and suturing    Anesthesia:  General with endotracheal intubation    Specimen:  Appendix and segment of cecum    EBL:  Minimal    This patient was found to have an adenomatous lesion within the appendiceal lumen during a routine screening colonoscopy  Patient subsequently presented for an appendectomy/partial cecectomy  After of procedure discussed with the patient along with its benefits risks alternatives the patient patient agreed to proceed  After general endotracheal anesthesia the abdomen was prepped and draped in usual fashion  An infraumbilical incision was made and a 12 mm trocar was passed into the abdominal cavity  The abdomen was insufflated with CO2  A 5 mm trocar was then placed in the suprapubic area and a 5 mm trocar was also placed in the left lower quadrant  The was then identified and the tip of the appendix was grasped with a grasper and elevated  Using Harmonic scalpel the mesoappendix was then taken down  After this was accomplished an echelon stapler was then utilized to resect the appendix and a portion of the cecum  After the stapler was fired and the appendix and segment of cecum was placed in a specimen bag and removed from the umbilical site  The pelvis and the right lower quadrant were then irrigated with saline  All trocars were removed and the umbilical site was closed with 0 Vicryl on a UR 6 needle  Subsequent to this all of scans incision sites were then coapted with 4-0 Monocryl and skin glue  At the conclusion of the procedure patient tolerated well    She left the OR in good condition

## 2021-08-02 NOTE — DISCHARGE INSTRUCTIONS
1  Resume regular diet    2  Keep wounds clean and dry however he may shower    3  Take Percocet every 4 hours as needed for pain    4   Call [phone] if any problems or questions

## 2021-08-02 NOTE — INTERVAL H&P NOTE
H&P reviewed  After examining the patient I find no changes in the patients condition since the H&P had been written      Vitals:    08/02/21 0910   BP: 126/84   Pulse: 91   Resp: 18   Temp: 98 1 °F (36 7 °C)   SpO2: 97%

## 2021-08-02 NOTE — ANESTHESIA POSTPROCEDURE EVALUATION
Post-Op Assessment Note    CV Status:  Stable  Pain Score: 0    Pain management: adequate     Mental Status:  Sleepy   Hydration Status:  Euvolemic   PONV Controlled:  Controlled   Airway Patency:  Patent      Post Op Vitals Reviewed: Yes      Staff: Anesthesiologist, CRNA         No complications documented      BP      Temp      Pulse     Resp      SpO2

## 2021-08-18 ENCOUNTER — OFFICE VISIT (OUTPATIENT)
Dept: INTERNAL MEDICINE CLINIC | Facility: CLINIC | Age: 66
End: 2021-08-18
Payer: COMMERCIAL

## 2021-08-18 ENCOUNTER — TELEPHONE (OUTPATIENT)
Dept: ADMINISTRATIVE | Facility: OTHER | Age: 66
End: 2021-08-18

## 2021-08-18 VITALS
BODY MASS INDEX: 35.36 KG/M2 | TEMPERATURE: 98.3 F | DIASTOLIC BLOOD PRESSURE: 82 MMHG | OXYGEN SATURATION: 97 % | SYSTOLIC BLOOD PRESSURE: 130 MMHG | HEART RATE: 83 BPM | WEIGHT: 220 LBS | HEIGHT: 66 IN

## 2021-08-18 DIAGNOSIS — G43.109 MIGRAINE WITH AURA AND WITHOUT STATUS MIGRAINOSUS, NOT INTRACTABLE: ICD-10-CM

## 2021-08-18 DIAGNOSIS — I35.1 NONRHEUMATIC AORTIC (VALVE) INSUFFICIENCY: ICD-10-CM

## 2021-08-18 DIAGNOSIS — E78.2 MIXED HYPERLIPIDEMIA: ICD-10-CM

## 2021-08-18 DIAGNOSIS — Z23 NEED FOR PNEUMOCOCCAL VACCINATION: ICD-10-CM

## 2021-08-18 DIAGNOSIS — I10 ESSENTIAL HYPERTENSION: Primary | ICD-10-CM

## 2021-08-18 PROCEDURE — 99214 OFFICE O/P EST MOD 30 MIN: CPT | Performed by: INTERNAL MEDICINE

## 2021-08-18 PROCEDURE — 90732 PPSV23 VACC 2 YRS+ SUBQ/IM: CPT

## 2021-08-18 PROCEDURE — G0009 ADMIN PNEUMOCOCCAL VACCINE: HCPCS

## 2021-08-18 NOTE — TELEPHONE ENCOUNTER
Upon review of the In Basket request we were able to locate, review, and update the patient chart as requested for CRC: Colonoscopy  Any additional questions or concerns should be emailed to the Practice Liaisons via [email or web address] email, please do not reply via In Basket      Thank you  Italia Barboza

## 2021-08-18 NOTE — TELEPHONE ENCOUNTER
----- Message from Patti Barajas sent at 8/18/2021 10:31 AM EDT -----  Regarding: colo  08/18/21 10:31 AM    Hello, our patient Bard Hinojosa has had CRC: Colonoscopy completed/performed  Please assist in updating the patient chart by pulling the document from encounters Tab within Chart Review  The date of service is 07/2021       Thank you,  Noreen Harris MA  [facility] INTERNAL MED

## 2021-08-18 NOTE — PROGRESS NOTES
Assessment/Plan: This is a 57-year-old lady with a history of hypertension migraines hyperlipidemia aortic valve insufficiency  She denies any a chest pain or shortness of breath  1  Essential hypertension  Comments:  Continue labetalol verapamil and trandolapril    2  Migraine with aura and without status migrainosus, not intractable  Comments:  Continue protriptyline  3  Mixed hyperlipidemia    4  Nonrheumatic aortic (valve) insufficiency    5  Need for pneumococcal vaccination  -     PNEUMOCOCCAL POLYSACCHARIDE VACCINE 23-VALENT =>1YO SQ IM           There are no diagnoses linked to this encounter  Subjective:      Patient ID: [place] Ramsey is a 76 y o  female  This is a 57-year-old lady with a history of hypertension migraines hyperlipidemia aortic valve insufficiency  She denies any a chest pain or shortness of breath  The following portions of the patient's history were reviewed and updated as appropriate: She  has a past medical history of Allergic rhinitis, Aortic regurgitation, HTN (hypertension), Hyperlipidemia, Nonrheumatic aortic (valve) insufficiency, Sleep apnea, and Vitamin B12 deficiency  She   Patient Active Problem List    Diagnosis Date Noted    HTN (hypertension)     Hyperlipidemia     Obesity (BMI 30 0-34  9)     Migraine with aura and without status migrainosus, not intractable 04/07/2021    Aortic dilatation ([address] ) 04/07/2021    Nonrheumatic aortic (valve) insufficiency     Sleep apnea     Vitamin B12 deficiency      She  has a past surgical history that includes Dilation and curettage of uterus; [place] tooth extraction; Bunionectomy; Cholecystectomy; Colonoscopy (07/02/2018); Mammo (historical) (03/21/2017); and pr lap,appendectomy (N/A, 8/2/2021)  Her family history includes Coronary artery disease in her mother; Kidney cancer in her father; No Known Problems in her sister, sister, and sister  She  reports that she quit smoking about 16 years ago   Her smoking use included cigarettes  She has never used smokeless tobacco  She reports current alcohol use of about 5 0 standard drinks of alcohol per week  She reports that she does not use drugs  Current Outpatient Medications   Medication Sig Dispense Refill    Calcium 600 MG tablet Take 2 tablets by mouth daily      cyanocobalamin (VITAMIN B-12) 500 mcg tablet Take 1 tablet by mouth daily      labetalol (NORMODYNE) 100 mg tablet Take 1 tablet (100 mg total) by mouth 2 (two) times a day 180 tablet 1    Multiple Vitamins-Minerals (MULTIVITAMIN ADULT) TABS Take 1 tablet by mouth daily      protriptyline (VIVACTIL) 5 MG tablet take 1 tablet by mouth at bedtime 30 tablet 2    rosuvastatin (CRESTOR) 5 mg tablet take 1 tablet by mouth once daily 30 tablet 2    trandolapril (MAVIK) 4 MG tablet take 1 tablet by mouth once daily 90 tablet 1    verapamil (CALAN) 80 mg tablet take 1 tablet three times a day 270 tablet 1     No current facility-administered medications for this visit  Current Outpatient Medications on File Prior to Visit   Medication Sig    Calcium 600 MG tablet Take 2 tablets by mouth daily    cyanocobalamin (VITAMIN B-12) 500 mcg tablet Take 1 tablet by mouth daily    labetalol (NORMODYNE) 100 mg tablet Take 1 tablet (100 mg total) by mouth 2 (two) times a day    Multiple Vitamins-Minerals (MULTIVITAMIN ADULT) TABS Take 1 tablet by mouth daily    protriptyline (VIVACTIL) 5 MG tablet take 1 tablet by mouth at bedtime    rosuvastatin (CRESTOR) 5 mg tablet take 1 tablet by mouth once daily    trandolapril (MAVIK) 4 MG tablet take 1 tablet by mouth once daily    verapamil (CALAN) 80 mg tablet take 1 tablet three times a day     No current facility-administered medications on file prior to visit  She is allergic to penicillins       Review of Systems   Constitutional: Negative for appetite change, chills, fatigue and fever  HENT: Negative for sore throat and trouble swallowing      Eyes: Negative for redness  Respiratory: Negative for shortness of breath  Cardiovascular: Negative for chest pain and palpitations  Gastrointestinal: Negative for abdominal pain, constipation and diarrhea  Genitourinary: Negative for dysuria and hematuria  Musculoskeletal: Negative for back pain and neck pain  Skin: Negative for rash  Neurological: Negative for seizures, weakness and headaches  Hematological: Negative for adenopathy  Psychiatric/Behavioral: Negative for confusion  The patient is not nervous/anxious            Objective:      /82 (BP Location: Left arm, Patient Position: Sitting, Cuff Size: Large)   Pulse 83   Temp 98 3 °F (36 8 °C) (Temporal)   Ht 5' 6" (1 676 m)   Wt 99 8 kg (220 lb)   SpO2 97% Comment: ra  BMI 35 51 kg/m²     Recent Results (from the past 1344 hour(s))   Lipid panel    Collection Time: 07/20/21  8:52 AM   Result Value Ref Range    Total Cholesterol 162 <200 mg/dL    HDL 73 > OR = 50 mg/dL    Triglycerides 122 <150 mg/dL    LDL Calculated 69 mg/dL (calc)    Chol HDLC Ratio 2 2 <5 0 (calc)    Non-HDL Cholesterol 89 <130 mg/dL (calc)   Comprehensive metabolic panel    Collection Time: 07/20/21  8:52 AM   Result Value Ref Range    Glucose, Random 100 (H) 65 - 99 mg/dL    BUN 22 7 - 25 mg/dL    Creatinine 0 94 0 50 - 0 99 mg/dL    eGFR Non  63 > OR = 60 mL/min/1 73m2    eGFR  73 > OR = 60 mL/min/1 73m2    SL AMB BUN/CREATININE RATIO NOT APPLICABLE 6 - 22 (calc)    Sodium 139 135 - 146 mmol/L    Potassium 4 2 3 5 - 5 3 mmol/L    Chloride 107 98 - 110 mmol/L    CO2 23 20 - 32 mmol/L    Calcium 9 1 8 6 - 10 4 mg/dL    Protein, Total 6 3 6 1 - 8 1 g/dL    Albumin 4 2 3 6 - 5 1 g/dL    Globulin 2 1 1 9 - 3 7 g/dL (calc)    Albumin/Globulin Ratio 2 0 1 0 - 2 5 (calc)    TOTAL BILIRUBIN 0 5 0 2 - 1 2 mg/dL    Alkaline Phosphatase 61 37 - 153 U/L    AST 17 10 - 35 U/L    ALT 10 6 - 29 U/L   CBC and differential    Collection Time: 07/20/21  8:52 AM   Result Value Ref Range    White Blood Cell Count 5 4 3 8 - 10 8 Thousand/uL    Red Blood Cell Count 4 67 3 80 - 5 10 Million/uL    Hemoglobin 14 3 11 7 - 15 5 g/dL    HCT 42 7 35 0 - 45 0 %    MCV 91 4 80 0 - 100 0 fL    MCH 30 6 27 0 - 33 0 pg    MCHC 33 5 32 0 - 36 0 g/dL    RDW 14 5 11 0 - 15 0 %    Platelet Count 191 620 - 400 Thousand/uL    SL AMB MPV 11 9 7 5 - 12 5 fL    Neutrophils (Absolute) 3,229 1,500 - 7,800 cells/uL    Lymphocytes (Absolute) 1,669 850 - 3,900 cells/uL    Monocytes (Absolute) 362 200 - 950 cells/uL    Eosinophils (Absolute) 108 15 - 500 cells/uL    Basophils ABS 32 0 - 200 cells/uL    Neutrophils 59 8 %    Lymphocytes 30 9 %    Monocytes 6 7 %    Eosinophils 2 0 %    Basophils PCT 0 6 %   TSH, 3rd generation    Collection Time: 07/20/21  8:52 AM   Result Value Ref Range    TSH 2 26 0 40 - 4 50 mIU/L   UA (URINE) with reflex to Scope    Collection Time: 07/20/21  8:52 AM   Result Value Ref Range    Color UA YELLOW YELLOW    Urine Appearance CLEAR CLEAR    Specific Gravity 1 006 1 001 - 1 035    Ph 6 0 5 0 - 8 0    Glucose, Urine NEGATIVE NEGATIVE    Bilirubin, Urine NEGATIVE NEGATIVE    Ketone, Urine NEGATIVE NEGATIVE    Blood, Urine NEGATIVE NEGATIVE    Protein, Urine NEGATIVE NEGATIVE    SL AMB NITRITES URINE, QUAL   NEGATIVE NEGATIVE    Leukocyte Esterase TRACE (A) NEGATIVE    SL AMB WBC, URINE 0-5 < OR = 5 /HPF    RBC, Urine NONE SEEN < OR = 2 /HPF    Squamous Epithelial Cells 0-5 < OR = 5 /HPF    Bacteria, UA NONE SEEN NONE SEEN /HPF    Hyaline Casts NONE SEEN NONE SEEN /LPF   UA (URINE) with reflex to Scope    Collection Time: 07/29/21 10:19 AM   Result Value Ref Range    Color, UA Yellow     Clarity, UA Clear     Specific [place], UA 1 011 1 003 - 1 030    pH, UA 5 0 4 5, 5 0, 5 5, 6 0, 6 5, 7 0, 7 5, 8 0    Leukocytes, UA Trace (A) Negative    Nitrite, UA Negative Negative    Protein, UA Negative Negative mg/dl    Glucose, UA Negative Negative mg/dl    Ketones, UA Negative Negative mg/dl    Urobilinogen, UA 0 2 0 2, 1 0 E U /dl E U /dl    Bilirubin, UA Negative Negative    Blood, UA Negative Negative   CBC and differential    Collection Time: 07/29/21 10:19 AM   Result Value Ref Range    WBC 7 65 4 31 - 10 16 Thousand/uL    RBC 4 99 3 81 - 5 12 Million/uL    Hemoglobin 15 1 11 5 - 15 4 g/dL    Hematocrit 47 0 (H) 34 8 - 46 1 %    MCV 94 82 - 98 fL    MCH 30 3 26 8 - 34 3 pg    MCHC 32 1 31 4 - 37 4 g/dL    RDW 15 8 (H) 11 6 - 15 1 %    MPV 11 7 8 9 - 12 7 fL    Platelets 838 909 - 194 Thousands/uL    nRBC 0 /100 WBCs    Neutrophils Relative 65 43 - 75 %    Immat GRANS % 0 0 - 2 %    Lymphocytes Relative 28 14 - 44 %    Monocytes Relative 6 4 - 12 %    Eosinophils Relative 1 0 - 6 %    Basophils Relative 0 0 - 1 %    Neutrophils Absolute 4 92 1 85 - 7 62 Thousands/µL    Immature Grans Absolute 0 03 0 00 - 0 20 Thousand/uL    Lymphocytes Absolute 2 15 0 60 - 4 47 Thousands/µL    Monocytes Absolute 0 42 0 17 - 1 22 Thousand/µL    Eosinophils Absolute 0 10 0 00 - 0 61 Thousand/µL    Basophils Absolute 0 03 0 00 - 0 10 Thousands/µL   Comprehensive metabolic panel    Collection Time: 07/29/21 10:19 AM   Result Value Ref Range    Sodium 138 136 - 145 mmol/L    Potassium 4 3 3 5 - 5 3 mmol/L    Chloride 104 100 - 108 mmol/L    CO2 24 21 - 32 mmol/L    ANION GAP 10 4 - 13 mmol/L    BUN 21 5 - 25 mg/dL    Creatinine 1 18 0 60 - 1 30 mg/dL    Glucose, Fasting 103 (H) 65 - 99 mg/dL    Calcium 9 1 8 3 - 10 1 mg/dL    AST 18 5 - 45 U/L    ALT 18 12 - 78 U/L    Alkaline Phosphatase 80 46 - 116 U/L    Total Protein 7 4 6 4 - 8 2 g/dL    Albumin 4 2 3 5 - 5 0 g/dL    Total Bilirubin 0 47 0 20 - 1 00 mg/dL    eGFR 48 ml/min/1 73sq m   Urine Microscopic    Collection Time: 07/29/21 10:19 AM   Result Value Ref Range    RBC, UA None Seen None Seen, 2-4 /hpf    WBC, UA 2-4 None Seen, 2-4, 5-60 /hpf    Epithelial Cells None Seen None Seen, Occasional /hpf    Bacteria, UA Occasional None Seen, Occasional /hpf    Hyaline Casts, UA None Seen None Seen /lpf   ECG 12 lead    Collection Time: 07/29/21 10:32 AM   Result Value Ref Range    Ventricular Rate 65 BPM    Atrial Rate 65 BPM    MI Interval 154 ms    QRSD Interval 94 ms    QT Interval 448 ms    QTC Interval 465 ms    P Axis 16 degrees    QRS Axis -26 degrees    T Wave Axis 8 degrees   Tissue Exam    Collection Time: 08/02/21  1:05 PM   Result Value Ref Range    Case Report       Surgical Pathology Report                         Case: [id number]                                   Authorizing Provider:  Sergio Romero MD      Collected:           08/02/2021 1305              Ordering Location:     [address] [place] [address]      Received:            08/02/2021 [facility]                                                      Pathologist:           Sherie Quinones MD                                                        Specimen:    Appendix, partial cecectomy                                                                Final Diagnosis       A  Appendix, partial cecectomy:  - Tubular adenoma, involving proximal appendix  - Negative for high grade dysplasia/ carcinoma  Additional Information       All reported additional testing was performed with appropriately reactive controls  These tests were developed and their performance characteristics determined by [facility] or appropriate performing facility, though some tests may be performed on tissues which have not been validated for performance characteristics (such as staining performed on alcohol exposed cell blocks and decalcified tissues)  Results should be interpreted with caution and in the context of the patients clinical condition  These tests may not be cleared or approved by the [place]  Food and Drug Administration, though the FDA has determined that such clearance or approval is not necessary   These tests are used for clinical purposes and they should not be regarded as investigational or for research  This laboratory has been approved by CLIA 88, designated as a high-complexity laboratory and is qualified to perform these tests  Interpretation performed at [facility], [address] [place] [id number]      Gross Description          A  The specimen is received in formalin, labeled with the patient's name and hospital number, and is designated "appendix with partial cecectomy  The specimen consists of a tan, rubbery, vermiform appendix measuring approximately 5 8 cm in length and exhibiting a uniform diameter of 0 9 cm with a proximally attached bulbous portion of unoriented bowel measuring 3 0 x 3 0 x 1 9 cm  Mesoappendix is present the length of the appendix to a depth of 2 7 cm  The serosa is tan-pink, primarily smooth, glistening  The proximal staple line margin is inked blue  The specimen is serially sectioned revealing a dilated lumen containing a scant amount of semi solid and semi loose fecal matter with no other gross abnormalities identified  The bowel mucosa is tan-red, smooth, glistening, grossly unremarkable  The appendiceal wall ranges in thickness from 0 1-0 3 cm  No perforations are grossly identified  The mesoappendix is sectioned and palpated revealing yellow, glistening, fatty cut surfaces with no grossly identifiable lymph nodes  The entire specimen is sequentially submitted in a proximal to distal progression in 9 cassettes, with the portion of bowel in cassettes A1-4 and the longitudinally sectioned distal tip in cassettes 8-9  Note: The estimated total formalin fixation time based upon information provided by the submitting clinician and the standard processing schedule is under 72 hours  Radha            Physical Exam  Constitutional:       General: She is not in acute distress  Appearance: Normal appearance  HENT:      Head: Normocephalic and atraumatic        Right Ear: Tympanic membrane normal       Left Ear: Tympanic membrane normal       Nose: Nose normal       Mouth/Throat:      Mouth: Mucous membranes are moist    Eyes:      Extraocular Movements: Extraocular movements intact  Pupils: Pupils are equal, round, and reactive to light  Cardiovascular:      Rate and Rhythm: Normal rate and regular rhythm  Pulses: Normal pulses  Heart sounds: Murmur heard  No friction rub  Pulmonary:      Effort: Pulmonary effort is normal  No respiratory distress  Breath sounds: Normal breath sounds  No wheezing  Abdominal:      General: Abdomen is flat  Bowel sounds are normal  There is no distension  Palpations: Abdomen is soft  There is no mass  Tenderness: There is no abdominal tenderness  There is no guarding  Musculoskeletal:         General: Normal range of motion  Cervical back: Normal range of motion and neck supple  Skin:     General: Skin is warm and dry  Neurological:      General: No focal deficit present  Mental Status: She is alert and oriented to person, place, and time  Mental status is at baseline  Cranial Nerves: No cranial nerve deficit     Psychiatric:         Mood and Affect: Mood normal          Behavior: Behavior normal

## 2021-08-18 NOTE — TELEPHONE ENCOUNTER
Upon review of the In Basket request and the patient's chart, initial outreach has been made via telephone call, please see Contacts section for details  Spoke with Fran Carrel who stated that colonoscopy was performed on 7/12/2021  She will fax results       Thank you  Joleen Persaud

## 2021-10-06 DIAGNOSIS — E78.2 MIXED HYPERLIPIDEMIA: ICD-10-CM

## 2021-10-06 RX ORDER — ROSUVASTATIN CALCIUM 5 MG/1
TABLET, COATED ORAL
Qty: 30 TABLET | Refills: 2 | Status: SHIPPED | OUTPATIENT
Start: 2021-10-06 | End: 2021-12-20 | Stop reason: SDUPTHER

## 2021-10-07 DIAGNOSIS — G43.109 MIGRAINE WITH AURA AND WITHOUT STATUS MIGRAINOSUS, NOT INTRACTABLE: ICD-10-CM

## 2021-10-07 RX ORDER — PROTRIPTYLINE HYDROCHLORIDE 5 MG/1
5 TABLET, FILM COATED ORAL
Qty: 30 TABLET | Refills: 2 | Status: SHIPPED | OUTPATIENT
Start: 2021-10-07 | End: 2021-10-08

## 2021-10-08 DIAGNOSIS — G43.109 MIGRAINE WITH AURA AND WITHOUT STATUS MIGRAINOSUS, NOT INTRACTABLE: ICD-10-CM

## 2021-10-08 RX ORDER — PROTRIPTYLINE HYDROCHLORIDE 5 MG/1
TABLET, FILM COATED ORAL
Qty: 30 TABLET | Refills: 2 | Status: SHIPPED | OUTPATIENT
Start: 2021-10-08 | End: 2021-12-20 | Stop reason: SDUPTHER

## 2021-11-03 ENCOUNTER — VBI (OUTPATIENT)
Dept: ADMINISTRATIVE | Facility: OTHER | Age: 66
End: 2021-11-03

## 2021-12-20 ENCOUNTER — OFFICE VISIT (OUTPATIENT)
Dept: INTERNAL MEDICINE CLINIC | Facility: CLINIC | Age: 66
End: 2021-12-20
Payer: COMMERCIAL

## 2021-12-20 VITALS
OXYGEN SATURATION: 98 % | TEMPERATURE: 97.8 F | BODY MASS INDEX: 36.16 KG/M2 | DIASTOLIC BLOOD PRESSURE: 78 MMHG | HEIGHT: 66 IN | SYSTOLIC BLOOD PRESSURE: 128 MMHG | WEIGHT: 225 LBS | HEART RATE: 77 BPM

## 2021-12-20 DIAGNOSIS — E78.2 MIXED HYPERLIPIDEMIA: ICD-10-CM

## 2021-12-20 DIAGNOSIS — N18.31 STAGE 3A CHRONIC KIDNEY DISEASE (HCC): ICD-10-CM

## 2021-12-20 DIAGNOSIS — G47.33 OBSTRUCTIVE SLEEP APNEA SYNDROME: ICD-10-CM

## 2021-12-20 DIAGNOSIS — Z13.820 ENCOUNTER FOR OSTEOPOROSIS SCREENING IN ASYMPTOMATIC POSTMENOPAUSAL PATIENT: ICD-10-CM

## 2021-12-20 DIAGNOSIS — E55.9 VITAMIN D DEFICIENCY: ICD-10-CM

## 2021-12-20 DIAGNOSIS — E66.01 OBESITY, MORBID (HCC): ICD-10-CM

## 2021-12-20 DIAGNOSIS — I10 ESSENTIAL HYPERTENSION: Primary | ICD-10-CM

## 2021-12-20 DIAGNOSIS — G43.109 MIGRAINE WITH AURA AND WITHOUT STATUS MIGRAINOSUS, NOT INTRACTABLE: ICD-10-CM

## 2021-12-20 DIAGNOSIS — Z78.0 ENCOUNTER FOR OSTEOPOROSIS SCREENING IN ASYMPTOMATIC POSTMENOPAUSAL PATIENT: ICD-10-CM

## 2021-12-20 DIAGNOSIS — I77.819 AORTIC DILATATION (HCC): ICD-10-CM

## 2021-12-20 DIAGNOSIS — I35.1 NONRHEUMATIC AORTIC (VALVE) INSUFFICIENCY: ICD-10-CM

## 2021-12-20 PROCEDURE — 99214 OFFICE O/P EST MOD 30 MIN: CPT | Performed by: INTERNAL MEDICINE

## 2021-12-20 RX ORDER — TRANDOLAPRIL TABLETS 4 MG/1
4 TABLET ORAL DAILY
Qty: 90 TABLET | Refills: 1 | Status: SHIPPED | OUTPATIENT
Start: 2021-12-20 | End: 2022-05-23 | Stop reason: SDUPTHER

## 2021-12-20 RX ORDER — ROSUVASTATIN CALCIUM 5 MG/1
5 TABLET, COATED ORAL DAILY
Qty: 90 TABLET | Refills: 0 | Status: SHIPPED | OUTPATIENT
Start: 2021-12-20 | End: 2022-03-28

## 2021-12-20 RX ORDER — LABETALOL 100 MG/1
100 TABLET, FILM COATED ORAL 2 TIMES DAILY
Qty: 180 TABLET | Refills: 1 | Status: SHIPPED | OUTPATIENT
Start: 2021-12-20 | End: 2022-05-23 | Stop reason: SDUPTHER

## 2021-12-20 RX ORDER — VERAPAMIL HYDROCHLORIDE 80 MG/1
80 TABLET ORAL 3 TIMES DAILY
Qty: 270 TABLET | Refills: 1 | Status: SHIPPED | OUTPATIENT
Start: 2021-12-20

## 2021-12-20 RX ORDER — PROTRIPTYLINE HYDROCHLORIDE 5 MG/1
5 TABLET, FILM COATED ORAL
Qty: 90 TABLET | Refills: 1 | Status: SHIPPED | OUTPATIENT
Start: 2021-12-20 | End: 2022-04-18 | Stop reason: SDUPTHER

## 2021-12-23 ENCOUNTER — TELEPHONE (OUTPATIENT)
Dept: ADMINISTRATIVE | Facility: OTHER | Age: 66
End: 2021-12-23

## 2022-01-21 LAB
25(OH)D3 SERPL-MCNC: 27 NG/ML (ref 30–100)
ALBUMIN SERPL-MCNC: 4.5 G/DL (ref 3.6–5.1)
ALBUMIN/GLOB SERPL: 2 (CALC) (ref 1–2.5)
ALP SERPL-CCNC: 65 U/L (ref 37–153)
ALT SERPL-CCNC: 10 U/L (ref 6–29)
APPEARANCE UR: CLEAR
AST SERPL-CCNC: 16 U/L (ref 10–35)
BACTERIA UR QL AUTO: ABNORMAL /HPF
BASOPHILS # BLD AUTO: 31 CELLS/UL (ref 0–200)
BASOPHILS NFR BLD AUTO: 0.5 %
BILIRUB SERPL-MCNC: 0.5 MG/DL (ref 0.2–1.2)
BILIRUB UR QL STRIP: NEGATIVE
BUN SERPL-MCNC: 21 MG/DL (ref 7–25)
BUN/CREAT SERPL: 19 (CALC) (ref 6–22)
CALCIUM SERPL-MCNC: 9.4 MG/DL (ref 8.6–10.4)
CHLORIDE SERPL-SCNC: 103 MMOL/L (ref 98–110)
CO2 SERPL-SCNC: 25 MMOL/L (ref 20–32)
COLOR UR: YELLOW
CREAT SERPL-MCNC: 1.08 MG/DL (ref 0.5–0.99)
EOSINOPHIL # BLD AUTO: 67 CELLS/UL (ref 15–500)
EOSINOPHIL NFR BLD AUTO: 1.1 %
ERYTHROCYTE [DISTWIDTH] IN BLOOD BY AUTOMATED COUNT: 13.6 % (ref 11–15)
GLOBULIN SER CALC-MCNC: 2.2 G/DL (CALC) (ref 1.9–3.7)
GLUCOSE SERPL-MCNC: 86 MG/DL (ref 65–99)
GLUCOSE UR QL STRIP: NEGATIVE
HCT VFR BLD AUTO: 44.9 % (ref 35–45)
HGB BLD-MCNC: 14.9 G/DL (ref 11.7–15.5)
HGB UR QL STRIP: NEGATIVE
HYALINE CASTS #/AREA URNS LPF: ABNORMAL /LPF
KETONES UR QL STRIP: NEGATIVE
LEUKOCYTE ESTERASE UR QL STRIP: ABNORMAL
LYMPHOCYTES # BLD AUTO: 1598 CELLS/UL (ref 850–3900)
LYMPHOCYTES NFR BLD AUTO: 26.2 %
MCH RBC QN AUTO: 30.2 PG (ref 27–33)
MCHC RBC AUTO-ENTMCNC: 33.2 G/DL (ref 32–36)
MCV RBC AUTO: 90.9 FL (ref 80–100)
MONOCYTES # BLD AUTO: 372 CELLS/UL (ref 200–950)
MONOCYTES NFR BLD AUTO: 6.1 %
NEUTROPHILS # BLD AUTO: 4032 CELLS/UL (ref 1500–7800)
NEUTROPHILS NFR BLD AUTO: 66.1 %
NITRITE UR QL STRIP: NEGATIVE
PH UR STRIP: ABNORMAL [PH] (ref 5–8)
PLATELET # BLD AUTO: 190 THOUSAND/UL (ref 140–400)
PMV BLD REES-ECKER: 12 FL (ref 7.5–12.5)
POTASSIUM SERPL-SCNC: 4.5 MMOL/L (ref 3.5–5.3)
PROT SERPL-MCNC: 6.7 G/DL (ref 6.1–8.1)
PROT UR QL STRIP: NEGATIVE
RBC # BLD AUTO: 4.94 MILLION/UL (ref 3.8–5.1)
RBC #/AREA URNS HPF: ABNORMAL /HPF
SL AMB EGFR AFRICAN AMERICAN: 62 ML/MIN/1.73M2
SL AMB EGFR NON AFRICAN AMERICAN: 53 ML/MIN/1.73M2
SODIUM SERPL-SCNC: 138 MMOL/L (ref 135–146)
SP GR UR STRIP: 1.01 (ref 1–1.03)
SQUAMOUS #/AREA URNS HPF: ABNORMAL /HPF
WBC # BLD AUTO: 6.1 THOUSAND/UL (ref 3.8–10.8)
WBC #/AREA URNS HPF: ABNORMAL /HPF

## 2022-02-18 ENCOUNTER — HOSPITAL ENCOUNTER (OUTPATIENT)
Dept: NON INVASIVE DIAGNOSTICS | Facility: CLINIC | Age: 67
Discharge: HOME | End: 2022-02-18
Payer: COMMERCIAL

## 2022-02-18 VITALS
WEIGHT: 225 LBS | BODY MASS INDEX: 36.16 KG/M2 | HEIGHT: 66 IN | SYSTOLIC BLOOD PRESSURE: 128 MMHG | DIASTOLIC BLOOD PRESSURE: 78 MMHG | HEART RATE: 71 BPM

## 2022-02-18 DIAGNOSIS — I51.7 LVH (LEFT VENTRICULAR HYPERTROPHY): ICD-10-CM

## 2022-02-18 DIAGNOSIS — I10 ESSENTIAL (PRIMARY) HYPERTENSION: ICD-10-CM

## 2022-02-18 DIAGNOSIS — I35.9 AORTIC VALVE DISEASE: ICD-10-CM

## 2022-02-18 LAB
AORTIC ROOT: 3.7 CM
APICAL FOUR CHAMBER EJECTION FRACTION: 50 %
ASCENDING AORTA: 4 CM (ref 2.14–3.2)
AV REGURGITATION PRESSURE HALF TIME: 497 MS
E WAVE DECELERATION TIME: 139 MS
FRACTIONAL SHORTENING: 28 % (ref 28–44)
INTERVENTRICULAR SEPTUM IN DIASTOLE (PARASTERNAL SHORT AXIS VIEW): 1.1 CM (ref 0.56–1.04)
INTERVENTRICULAR SEPTUM: 1.1 CM (ref 0.6–1.1)
LAAS-AP2: 18.2 CM2
LAAS-AP4: 15.7 CM2
LEFT ATRIUM SIZE: 4 CM
LEFT INTERNAL DIMENSION IN SYSTOLE: 3.8 CM (ref 3.82–5.79)
LEFT VENTRICLE DIASTOLIC VOLUME (MOD BIPLANE): 133 ML (ref 107.56–242.24)
LEFT VENTRICLE SYSTOLIC VOLUME (MOD BIPLANE): 66 ML
LEFT VENTRICULAR INTERNAL DIMENSION IN DIASTOLE: 5.3 CM (ref 6.35–9.47)
LEFT VENTRICULAR POSTERIOR WALL IN END DIASTOLE: 1 CM (ref 0.54–1.03)
LEFT VENTRICULAR STROKE VOLUME: 75 ML
LV EF: 50 %
LVSV (TEICH): 75 ML
MV E'TISSUE VEL-SEP: 11 CM/S
MV PEAK A VEL: 1.08 M/S
MV PEAK E VEL: 90 CM/S
MV STENOSIS PRESSURE HALF TIME: 40 MS
MV VALVE AREA P 1/2 METHOD: 5.5 CM2
RIGHT ATRIUM AREA SYSTOLE A4C: 13.5 CM2
RIGHT VENTRICLE ID DIMENSION: 2.9 CM
SL CV AV DECELERATION TIME RETROGRADE: 1713 MS
SL CV AV PEAK GRADIENT RETROGRADE: 53 MMHG
SL CV LEFT ATRIUM LENGTH A2C: 5.5 CM
SL CV LV DIAS VOL ENDO Z SCORE: -1.24
SL CV LV EF: 55
SL CV PED ECHO LEFT VENTRICLE DIASTOLIC VOLUME (MOD BIPLANE) 2D: 135 ML
SL CV PED ECHO LEFT VENTRICLE SYSTOLIC VOLUME (MOD BIPLANE) 2D: 60 ML
Z-SCORE OF ASCENDING AORTA: 4.97
Z-SCORE OF INTERVENTRICULAR SEPTUM IN END DIASTOLE: 2.44
Z-SCORE OF LEFT VENTRICULAR DIMENSION IN END DIASTOLE: -3.8
Z-SCORE OF LEFT VENTRICULAR DIMENSION IN END SYSTOLE: -1.69
Z-SCORE OF LEFT VENTRICULAR POSTERIOR WALL IN END DIASTOLE: 1.74

## 2022-02-18 PROCEDURE — 93306 TTE W/DOPPLER COMPLETE: CPT | Performed by: INTERNAL MEDICINE

## 2022-02-18 PROCEDURE — 93306 TTE W/DOPPLER COMPLETE: CPT

## 2022-02-21 ENCOUNTER — TELEPHONE (OUTPATIENT)
Dept: INTERNAL MEDICINE CLINIC | Facility: CLINIC | Age: 67
End: 2022-02-21

## 2022-02-21 NOTE — TELEPHONE ENCOUNTER
Please call  patient back about her lab work done in December  She is concerned because it says kidney disease

## 2022-02-21 NOTE — TELEPHONE ENCOUNTER
Spoke to pt to let her know as per Dr Mercedez Magana, her levels have been about the same for a while according to previous blood tests, she is at her baseline, this is due to long standing hypertension

## 2022-02-25 NOTE — TELEPHONE ENCOUNTER
Spoke to patient and went over her creatinine levels and GFR from old software GFR in the 50s and 60s  Creatinine close to baseline

## 2022-03-14 NOTE — PROGRESS NOTES
Cardiology Follow Up    Anjana Adkins  1955  [id number]  [facility]  [address]   [phone]    1  Essential (primary) hypertension  Echo complete w/ contrast if indicated   2  LVH (left ventricular hypertrophy)  Echo complete w/ contrast if indicated   3  Aortic valve disease  Echo complete w/ contrast if indicated       Interval History:  Patient is here for a follow-up visit  Patient with history of AVD     Lipid profile done 2021 demonstrated total cholesterol of 162 with an HDL of 73 and a calculated LDL of 69  Echo done 2022 demonstrated preserved LV systolic function with LVEF of 55%  Moderate AI was noted  Compared to a prior study done 2021 there is been [place]  Patient has been well  She has had no chest pain or significant dyspnea  Her vital signs are stable today  Patient Active Problem List   Diagnosis    Nonrheumatic aortic (valve) insufficiency    Sleep apnea    Vitamin B12 deficiency    Migraine with aura and without status migrainosus, not intractable    Aortic dilatation (HCC)    Essential hypertension    Hyperlipidemia    Obesity (BMI 30 0-34  9)    Obesity, morbid ([address] )    Stage 3a chronic kidney disease ([address] )    Vitamin D deficiency     Past Medical History:   Diagnosis Date    Allergic rhinitis     Aortic regurgitation     HTN (hypertension)     Hyperlipidemia     Nonrheumatic aortic (valve) insufficiency     Sleep apnea     Vitamin B12 deficiency      Social History     Socioeconomic History    Marital status: /Civil Union     Spouse name: Not on file    Number of children: 2    Years of education: Not on file    Highest education level: Not on file   Occupational History    Not on file   Tobacco Use    Smoking status: Former Smoker     Types: Cigarettes     Quit date:      Years since quittin 2    Smokeless tobacco: Never Used   Vaping Use    Vaping Use: Never used   Substance and Sexual Activity    Alcohol use:  Yes     Alcohol/week: 5 0 standard drinks     Types: 5 Standard drinks or equivalent per week    Drug use: Never    Sexual activity: Not Currently   Other Topics Concern    Not on file   Social History Narrative    2 daughters    Travel outside [place]: No      Social Determinants of Health     Financial Resource Strain: Not on file   Food Insecurity: Not on file   Transportation Needs: Not on file   Physical Activity: Not on file   Stress: Not on file   Social Connections: Not on file   Intimate Partner Violence: Not on file   Housing Stability: Not on file      Family History   Problem Relation Age of Onset    Coronary artery disease Mother     Kidney cancer Father     No Known Problems Sister     No Known Problems Sister     No Known Problems Sister      Past Surgical History:   Procedure Laterality Date    BUNIONECTOMY      CHOLECYSTECTOMY      COLONOSCOPY  07/02/2018    DILATION AND CURETTAGE OF UTERUS      MAMMO (HISTORICAL)  03/21/2017 5/18/2018 and 3/21/2017     NM LAP,APPENDECTOMY N/A 8/2/2021    Procedure: APPENDECTOMY LAPAROSCOPIC;  Surgeon: Romi Gatica MD;  [facility]: [facility];  Service: Colorectal    WISDOM TOOTH EXTRACTION         Current Outpatient Medications:     Calcium 600 MG tablet, Take 2 tablets by mouth daily, Disp: , Rfl:     cyanocobalamin (VITAMIN B-12) 500 mcg tablet, Take 1 tablet by mouth daily, Disp: , Rfl:     labetalol (NORMODYNE) 100 mg tablet, Take 1 tablet (100 mg total) by mouth 2 (two) times a day, Disp: 180 tablet, Rfl: 1    Multiple Vitamins-Minerals (MULTIVITAMIN ADULT) TABS, Take 1 tablet by mouth daily, Disp: , Rfl:     protriptyline (VIVACTIL) 5 MG tablet, Take 1 tablet (5 mg total) by mouth daily at bedtime, Disp: 90 tablet, Rfl: 1    rosuvastatin (CRESTOR) 5 mg tablet, Take 1 tablet (5 mg total) by mouth daily, Disp: 90 tablet, Rfl: 0    trandolapril (MAVIK) 4 MG tablet, Take 1 tablet (4 mg total) by mouth daily, Disp: 90 tablet, Rfl: 1    verapamil (CALAN) 80 mg tablet, Take 1 tablet (80 mg total) by mouth 3 (three) times a day, Disp: 270 tablet, Rfl: 1  Allergies   Allergen Reactions    Penicillins Rash     Other reaction(s): PENICILLIN G POTASSIUM (PENICILLIN)         Labs:not applicable  Imaging: Echo complete    Result Date: 2/18/2022  Narrative: Isamar Green  Left Ventricle: Left ventricular cavity size is normal  Wall thickness is normal  The left ventricular ejection fraction is 55%  Systolic function is normal  Wall motion is normal  Diastolic function is mildly abnormal, consistent with grade I (abnormal) relaxation    Right Ventricle: Right ventricular cavity size is normal  Systolic function is normal    Aortic Valve: The aortic valve is trileaflet  The leaflets are moderately calcified  There is moderate regurgitation  Review of Systems:  Review of Systems   All other systems reviewed and are negative  Physical Exam:  /80 (BP Location: Left arm, Patient Position: Sitting, Cuff Size: Large)   Pulse 78   Ht 5' 6" (1 676 m)   Wt 100 kg (221 lb 4 8 oz)   SpO2 98%   BMI 35 72 kg/m²   Physical Exam  Vitals reviewed  Constitutional:       Appearance: She is well-developed  HENT:      Head: Normocephalic and atraumatic  Eyes:      Conjunctiva/sclera: Conjunctivae normal       Pupils: Pupils are equal, round, and reactive to light  Cardiovascular:      Rate and Rhythm: Normal rate  Heart sounds: Normal heart sounds  Comments: S1-S2 with early peaking systolic murmur heard at the right upper sternal border and faint 1/4 diastolic murmur heard  Pulmonary:      Effort: Pulmonary effort is normal       Breath sounds: Normal breath sounds  Musculoskeletal:      Cervical back: Normal range of motion and neck supple  Skin:     General: Skin is warm and dry     Neurological:      Mental Status: She is alert and oriented to person, place, and time  Discussion/Summary:I will continue the patient's present medical regimen  The patient appears well compensated  I have asked the patient to call if there is a problem in the interim otherwise I will see the patient in one years time  Patient is due for an echocardiogram prior to the next visit to assess LV wall thickness and systolic function

## 2022-03-23 ENCOUNTER — OFFICE VISIT (OUTPATIENT)
Dept: CARDIOLOGY CLINIC | Facility: CLINIC | Age: 67
End: 2022-03-23
Payer: COMMERCIAL

## 2022-03-23 VITALS
SYSTOLIC BLOOD PRESSURE: 140 MMHG | DIASTOLIC BLOOD PRESSURE: 80 MMHG | OXYGEN SATURATION: 98 % | BODY MASS INDEX: 35.57 KG/M2 | WEIGHT: 221.3 LBS | HEIGHT: 66 IN | HEART RATE: 78 BPM

## 2022-03-23 DIAGNOSIS — I35.9 AORTIC VALVE DISEASE: ICD-10-CM

## 2022-03-23 DIAGNOSIS — I10 ESSENTIAL (PRIMARY) HYPERTENSION: Primary | ICD-10-CM

## 2022-03-23 DIAGNOSIS — I51.7 LVH (LEFT VENTRICULAR HYPERTROPHY): ICD-10-CM

## 2022-03-23 PROCEDURE — 99214 OFFICE O/P EST MOD 30 MIN: CPT | Performed by: INTERNAL MEDICINE

## 2022-03-24 ENCOUNTER — HOSPITAL ENCOUNTER (OUTPATIENT)
Dept: RADIOLOGY | Age: 67
Discharge: HOME | End: 2022-03-24
Payer: COMMERCIAL

## 2022-03-24 DIAGNOSIS — Z78.0 ENCOUNTER FOR OSTEOPOROSIS SCREENING IN ASYMPTOMATIC POSTMENOPAUSAL PATIENT: ICD-10-CM

## 2022-03-24 DIAGNOSIS — Z13.820 ENCOUNTER FOR OSTEOPOROSIS SCREENING IN ASYMPTOMATIC POSTMENOPAUSAL PATIENT: ICD-10-CM

## 2022-03-24 PROCEDURE — 77080 DXA BONE DENSITY AXIAL: CPT

## 2022-04-18 ENCOUNTER — OFFICE VISIT (OUTPATIENT)
Dept: INTERNAL MEDICINE CLINIC | Facility: CLINIC | Age: 67
End: 2022-04-18
Payer: COMMERCIAL

## 2022-04-18 VITALS
OXYGEN SATURATION: 99 % | HEIGHT: 66 IN | TEMPERATURE: 98.2 F | WEIGHT: 216 LBS | DIASTOLIC BLOOD PRESSURE: 80 MMHG | SYSTOLIC BLOOD PRESSURE: 132 MMHG | HEART RATE: 81 BPM | BODY MASS INDEX: 34.72 KG/M2

## 2022-04-18 DIAGNOSIS — Z00.00 MEDICARE ANNUAL WELLNESS VISIT, SUBSEQUENT: ICD-10-CM

## 2022-04-18 DIAGNOSIS — I10 ESSENTIAL HYPERTENSION: ICD-10-CM

## 2022-04-18 DIAGNOSIS — E66.01 OBESITY, MORBID (HCC): ICD-10-CM

## 2022-04-18 DIAGNOSIS — G43.109 MIGRAINE WITH AURA AND WITHOUT STATUS MIGRAINOSUS, NOT INTRACTABLE: ICD-10-CM

## 2022-04-18 DIAGNOSIS — E78.2 MIXED HYPERLIPIDEMIA: ICD-10-CM

## 2022-04-18 DIAGNOSIS — Z87.891 HISTORY OF SMOKING 30 OR MORE PACK YEARS: ICD-10-CM

## 2022-04-18 DIAGNOSIS — E78.2 MIXED HYPERLIPIDEMIA: Primary | ICD-10-CM

## 2022-04-18 DIAGNOSIS — I35.1 NONRHEUMATIC AORTIC (VALVE) INSUFFICIENCY: ICD-10-CM

## 2022-04-18 DIAGNOSIS — I77.819 AORTIC DILATATION (HCC): ICD-10-CM

## 2022-04-18 DIAGNOSIS — N18.31 STAGE 3A CHRONIC KIDNEY DISEASE (HCC): ICD-10-CM

## 2022-04-18 PROCEDURE — 99214 OFFICE O/P EST MOD 30 MIN: CPT | Performed by: INTERNAL MEDICINE

## 2022-04-18 PROCEDURE — G0439 PPPS, SUBSEQ VISIT: HCPCS | Performed by: INTERNAL MEDICINE

## 2022-04-18 RX ORDER — ROSUVASTATIN CALCIUM 5 MG/1
5 TABLET, COATED ORAL DAILY
Qty: 90 TABLET | Refills: 1 | Status: SHIPPED | OUTPATIENT
Start: 2022-04-18

## 2022-04-18 RX ORDER — MELATONIN
1000 DAILY
COMMUNITY

## 2022-04-18 RX ORDER — LANOLIN ALCOHOL/MO/W.PET/CERES
1 CREAM (GRAM) TOPICAL 2 TIMES DAILY
COMMUNITY

## 2022-04-18 RX ORDER — PROTRIPTYLINE HYDROCHLORIDE 5 MG/1
5 TABLET, FILM COATED ORAL
Qty: 90 TABLET | Refills: 1 | Status: SHIPPED | OUTPATIENT
Start: 2022-04-18

## 2022-04-18 NOTE — PATIENT INSTRUCTIONS
Medicare Preventive Visit Patient Instructions  Thank you for completing your Welcome to Medicare Visit or Medicare Annual Wellness Visit today  Your next wellness visit will be due in one year (4/19/2023)  The screening/preventive services that you may require over the next 5-10 years are detailed below  Some tests may not apply to you based off risk factors and/or age  Screening tests ordered at today's visit but not completed yet may show as past due  Also, please note that scanned in results may not display below  Preventive Screenings:  Service Recommendations Previous Testing/Comments   Colorectal Cancer Screening  * Colonoscopy    * Fecal Occult Blood Test (FOBT)/Fecal Immunochemical Test (FIT)  * Fecal DNA/Cologuard Test  * Flexible Sigmoidoscopy Age: 54-65 years old   Colonoscopy: every 10 years (may be performed more frequently if at higher risk)  OR  FOBT/FIT: every 1 year  OR  Cologuard: every 3 years  OR  Sigmoidoscopy: every 5 years  Screening may be recommended earlier than age 48 if at higher risk for colorectal cancer  Also, an individualized decision between you and your healthcare provider will decide whether screening between the ages of 74-80 would be appropriate  Colonoscopy: 07/12/2021  FOBT/FIT: Not on file  Cologuard: Not on file  Sigmoidoscopy: Not on file    Screening Current     Breast Cancer Screening Age: 36 years old  Frequency: every 1-2 years  Not required if history of left and right mastectomy Mammogram: 12/10/2021    Screening Current   Cervical Cancer Screening Between the ages of 21-29, pap smear recommended once every 3 years  Between the ages of 33-67, can perform pap smear with HPV co-testing every 5 years     Recommendations may differ for women with a history of total hysterectomy, cervical cancer, or abnormal pap smears in past  Pap Smear: Not on file    Screening Not Indicated   Hepatitis C Screening Once for adults born between 1945 and 1965  More frequently in patients at high risk for Hepatitis C Hep C Antibody: Not on file        Diabetes Screening 1-2 times per year if you're at risk for diabetes or have pre-diabetes Fasting glucose: 103 mg/dL   A1C: No results in last 5 years    Screening Current   Cholesterol Screening Once every 5 years if you don't have a lipid disorder  May order more often based on risk factors  Lipid panel: 07/20/2021    Screening Not Indicated  History Lipid Disorder     Other Preventive Screenings Covered by Medicare:  1  Abdominal Aortic Aneurysm (AAA) Screening: covered once if your at risk  You're considered to be at risk if you have a family history of AAA  2  Lung Cancer Screening: covers low dose CT scan once per year if you meet all of the following conditions: (1) Age 50-69; (2) No signs or symptoms of lung cancer; (3) Current smoker or have quit smoking within the last 15 years; (4) You have a tobacco smoking history of at least 30 pack years (packs per day multiplied by number of years you smoked); (5) You get a written order from a healthcare provider  3  Glaucoma Screening: covered annually if you're considered high risk: (1) You have diabetes OR (2) Family history of glaucoma OR (3)  aged 48 and older OR (3)  American aged 72 and older  3  Osteoporosis Screening: covered every 2 years if you meet one of the following conditions: (1) You're estrogen deficient and at risk for osteoporosis based off medical history and other findings; (2) Have a vertebral abnormality; (3) On glucocorticoid therapy for more than 3 months; (4) Have primary hyperparathyroidism; (5) On osteoporosis medications and need to assess response to drug therapy  · Last bone density test (DXA Scan): 03/24/2022   5  HIV Screening: covered annually if you're between the age of 15-65  Also covered annually if you are younger than 13 and older than 72 with risk factors for HIV infection   For pregnant patients, it is covered up to 3 times per pregnancy  Immunizations:  Immunization Recommendations   Influenza Vaccine Annual influenza vaccination during flu season is recommended for all persons aged >= 6 months who do not have contraindications   Pneumococcal Vaccine (Prevnar and Pneumovax)  * Prevnar = PCV13  * Pneumovax = PPSV23   Adults 25-60 years old: 1-3 doses may be recommended based on certain risk factors  Adults 72 years old: Prevnar (PCV13) vaccine recommended followed by Pneumovax (PPSV23) vaccine  If already received PPSV23 since turning 65, then PCV13 recommended at least one year after PPSV23 dose  Hepatitis B Vaccine 3 dose series if at intermediate or high risk (ex: diabetes, end stage renal disease, liver disease)   Tetanus (Td) Vaccine - COST NOT COVERED BY MEDICARE PART B Following completion of primary series, a booster dose should be given every 10 years to maintain immunity against tetanus  Td may also be given as tetanus wound prophylaxis  Tdap Vaccine - COST NOT COVERED BY MEDICARE PART B Recommended at least once for all adults  For pregnant patients, recommended with each pregnancy  Shingles Vaccine (Shingrix) - COST NOT COVERED BY MEDICARE PART B  2 shot series recommended in those aged 48 and above     Health Maintenance Due:      Topic Date Due    Hepatitis C Screening  Never done    Breast Cancer Screening: Mammogram  12/10/2022    Colorectal Cancer Screening  07/12/2024     Immunizations Due:  There are no preventive care reminders to display for this patient  Advance Directives   What are advance directives? Advance directives are legal documents that state your wishes and plans for medical care  These plans are made ahead of time in case you lose your ability to make decisions for yourself  Advance directives can apply to any medical decision, such as the treatments you want, and if you want to donate organs  What are the types of advance directives?   There are many types of advance directives, and each state has rules about how to use them  You may choose a combination of any of the following:  · Living will: This is a written record of the treatment you want  You can also choose which treatments you do not want, which to limit, and which to stop at a certain time  This includes surgery, medicine, IV fluid, and tube feedings  · Durable power of  for healthcare [facility]): This is a written record that states who you want to make healthcare choices for you when you are unable to make them for yourself  This person, called a proxy, is usually a family member or a friend  You may choose more than 1 proxy  · Do not resuscitate (DNR) order:  A DNR order is used in case your heart stops beating or you stop breathing  It is a request not to have certain forms of treatment, such as CPR  A DNR order may be included in other types of advance directives  · Medical directive: This covers the care that you want if you are in a coma, near death, or unable to make decisions for yourself  You can list the treatments you want for each condition  Treatment may include pain medicine, surgery, blood transfusions, dialysis, IV or tube feedings, and a ventilator (breathing machine)  · Values history: This document has questions about your views, beliefs, and how you feel and think about life  This information can help others choose the care that you would choose  Why are advance directives important? An advance directive helps you control your care  Although spoken wishes may be used, it is better to have your wishes written down  Spoken wishes can be misunderstood, or not followed  Treatments may be given even if you do not want them  An advance directive may make it easier for your family to make difficult choices about your care     Weight Management   Why it is important to manage your weight:  Being overweight increases your risk of health conditions such as heart disease, high blood pressure, type 2 diabetes, and certain types of cancer  It can also increase your risk for osteoarthritis, sleep apnea, and other respiratory problems  Aim for a slow, steady weight loss  Even a small amount of weight loss can lower your risk of health problems  How to lose weight safely:  A safe and healthy way to lose weight is to eat fewer calories and get regular exercise  You can lose up about 1 pound a week by decreasing the number of calories you eat by 500 calories each day  Healthy meal plan for weight management:  A healthy meal plan includes a variety of foods, contains fewer calories, and helps you stay healthy  A healthy meal plan includes the following:  · Eat whole-grain foods more often  A healthy meal plan should contain fiber  Fiber is the part of grains, fruits, and vegetables that is not broken down by your body  Whole-grain foods are healthy and provide extra fiber in your diet  Some examples of whole-grain foods are whole-wheat breads and pastas, oatmeal, brown rice, and bulgur  · Eat a variety of vegetables every day  Include dark, leafy greens such as spinach, kale, dary greens, and mustard greens  Eat yellow and orange vegetables such as carrots, sweet potatoes, and winter squash  · Eat a variety of fruits every day  Choose fresh or canned fruit (canned in its own juice or light syrup) instead of juice  Fruit juice has very little or no fiber  · Eat low-fat dairy foods  Drink fat-free (skim) milk or 1% milk  Eat fat-free yogurt and low-fat cottage cheese  Try low-fat cheeses such as mozzarella and other reduced-fat cheeses  · Choose meat and other protein foods that are low in fat  Choose beans or other legumes such as split peas or lentils  Choose fish, skinless poultry (chicken or turkey), or lean cuts of red meat (beef or pork)  Before you cook meat or poultry, cut off any visible fat  · Use less fat and oil  Try baking foods instead of frying them   Add less fat, such as margarine, sour cream, regular salad dressing and mayonnaise to foods  Eat fewer high-fat foods  Some examples of high-fat foods include french fries, doughnuts, ice cream, and cakes  · Eat fewer sweets  Limit foods and drinks that are high in sugar  This includes candy, cookies, regular soda, and sweetened drinks  Exercise:  Exercise at least 30 minutes per day on most days of the week  Some examples of exercise include walking, biking, dancing, and swimming  You can also fit in more physical activity by taking the stairs instead of the elevator or parking farther away from stores  Ask your healthcare provider about the best exercise plan for you  © Copyright [employer] 2018 Information is for End User's use only and may not be sold, redistributed or otherwise used for commercial purposes   All illustrations and images included in CareNotes® are the copyrighted property of A D A M , Inc  or [address] [place] [address]

## 2022-04-18 NOTE — PROGRESS NOTES
Assessment/Plan:           1  Mixed hyperlipidemia  -     rosuvastatin (CRESTOR) 5 mg tablet; Take 1 tablet (5 mg total) by mouth daily    2  Essential hypertension  Comments:  Blood pressure is under control  3  Nonrheumatic aortic (valve) insufficiency  Comments: This is being monitored and she is also following with Cardiology  4  Aortic dilatation (HCC)    5  Medicare annual wellness visit, subsequent    6  History of smoking 30 or more pack years  Comments:  CT lung cancer screening recommended  Orders:  -     CT lung screening program; Future; Expected date: 04/18/2022    7  Obesity, morbid ([facility] [address] )    8  Stage 3a chronic kidney disease ([facility] )  Comments:  Avoidance of nephrotoxic agents discussed  Orders:  -     US kidney and bladder; Future; Expected date: 04/18/2022  -     C-reactive protein; Future  -     Sedimentation rate, automated; Future  -     Antinuclear Antibodies ([facility]), IFA; Future  -     Microalbumin / creatinine urine ratio  -     Urinalysis with microscopic    9  Migraine with aura and without status migrainosus, not intractable  Comments:  Migraines have improved continue protriptyline  Orders:  -     protriptyline (VIVACTIL) 5 MG tablet; Take 1 tablet (5 mg total) by mouth daily at bedtime    10  Mixed hyperlipidemia  Comments:  continue rosuvastatin  Orders:  -     rosuvastatin (CRESTOR) 5 mg tablet; Take 1 tablet (5 mg total) by mouth daily           1  Mixed hyperlipidemia      2  Essential hypertension      3  Nonrheumatic aortic (valve) insufficiency      4  Aortic dilatation (HCC)      5  Medicare annual wellness visit, subsequent         No problem-specific Assessment & Plan notes found for this encounter  Subjective:      Patient ID: Clarence Angelo is a 78 y o  female      HPI    The following portions of the patient's history were reviewed and updated as appropriate: She  has a past medical history of Allergic rhinitis, Aortic regurgitation, HTN (hypertension), Hyperlipidemia, Nonrheumatic aortic (valve) insufficiency, Sleep apnea, and Vitamin B12 deficiency  She   Patient Active Problem List    Diagnosis Date Noted    Obesity, morbid ([facility] ) 12/20/2021    Stage 3a chronic kidney disease ([facility] ) 12/20/2021    Vitamin D deficiency 12/20/2021    Essential hypertension     Hyperlipidemia     Obesity (BMI 30 0-34  9)     Migraine with aura and without status migrainosus, not intractable 04/07/2021    Aortic dilatation ([facility] ) 04/07/2021    Nonrheumatic aortic (valve) insufficiency     Sleep apnea     Vitamin B12 deficiency      She  has a past surgical history that includes Dilation and curettage of uterus; [place] tooth extraction; Bunionectomy; Cholecystectomy; Colonoscopy (07/02/2018); Mammo (historical) (03/21/2017); and pr lap,appendectomy (N/A, 8/2/2021)  Her family history includes Coronary artery disease in her mother; Kidney cancer in her father; No Known Problems in her sister, sister, and sister  She  reports that she quit smoking about 17 years ago  Her smoking use included cigarettes  She has never used smokeless tobacco  She reports current alcohol use of about 5 0 standard drinks of alcohol per week  She reports that she does not use drugs    Current Outpatient Medications   Medication Sig Dispense Refill    Calcium 600 MG tablet Take 2 tablets by mouth daily      calcium citrate-vitamin D (CITRACAL+D) 315-200 MG-UNIT per tablet Take 1 tablet by mouth 2 (two) times a day      cholecalciferol (VITAMIN D3) 1,000 units tablet Take 1,000 Units by mouth daily      cyanocobalamin (VITAMIN B-12) 500 mcg tablet Take 1 tablet by mouth daily      labetalol (NORMODYNE) 100 mg tablet Take 1 tablet (100 mg total) by mouth 2 (two) times a day 180 tablet 1    Multiple Vitamins-Minerals (MULTIVITAMIN ADULT) TABS Take 1 tablet by mouth daily      protriptyline (VIVACTIL) 5 MG tablet Take 1 tablet (5 mg total) by mouth daily at bedtime 90 tablet 1    rosuvastatin (CRESTOR) 5 mg tablet Take 1 tablet (5 mg total) by mouth daily 90 tablet 1    trandolapril (MAVIK) 4 MG tablet Take 1 tablet (4 mg total) by mouth daily 90 tablet 1    verapamil (CALAN) 80 mg tablet Take 1 tablet (80 mg total) by mouth 3 (three) times a day 270 tablet 1     No current facility-administered medications for this visit  Current Outpatient Medications on File Prior to Visit   Medication Sig    Calcium 600 MG tablet Take 2 tablets by mouth daily    calcium citrate-vitamin D (CITRACAL+D) 315-200 MG-UNIT per tablet Take 1 tablet by mouth 2 (two) times a day    cholecalciferol (VITAMIN D3) 1,000 units tablet Take 1,000 Units by mouth daily    cyanocobalamin (VITAMIN B-12) 500 mcg tablet Take 1 tablet by mouth daily    labetalol (NORMODYNE) 100 mg tablet Take 1 tablet (100 mg total) by mouth 2 (two) times a day    Multiple Vitamins-Minerals (MULTIVITAMIN ADULT) TABS Take 1 tablet by mouth daily    trandolapril (MAVIK) 4 MG tablet Take 1 tablet (4 mg total) by mouth daily    verapamil (CALAN) 80 mg tablet Take 1 tablet (80 mg total) by mouth 3 (three) times a day    [DISCONTINUED] protriptyline (VIVACTIL) 5 MG tablet Take 1 tablet (5 mg total) by mouth daily at bedtime    [DISCONTINUED] rosuvastatin (CRESTOR) 5 mg tablet take 1 tablet by mouth once daily     No current facility-administered medications on file prior to visit  She is allergic to penicillins       Review of Systems   Constitutional: Negative for appetite change, chills, fatigue and fever  HENT: Negative for sore throat and trouble swallowing  Eyes: Negative for redness  Respiratory: Negative for shortness of breath  Cardiovascular: Negative for chest pain and palpitations  Gastrointestinal: Negative for abdominal pain, constipation and diarrhea  Genitourinary: Negative for dysuria and hematuria  Musculoskeletal: Negative for back pain and neck pain  Skin: Negative for rash     Neurological: Negative for seizures, weakness and headaches  Hematological: Negative for adenopathy  Psychiatric/Behavioral: Negative for confusion  The patient is not nervous/anxious  Objective:      /80 (BP Location: Left arm, Patient Position: Sitting, Cuff Size: Large)   Pulse 81   Temp 98 2 °F (36 8 °C) (Temporal)   Ht 5' 6" (1 676 m)   Wt 98 kg (216 lb)   SpO2 99%   BMI 34 86 kg/m²     Results Reviewed     None          No results found for this or any previous visit (from the past 1344 hour(s))  Physical Exam  Constitutional:       General: She is not in acute distress  Appearance: Normal appearance  She is obese  HENT:      Head: Normocephalic and atraumatic  Nose: Nose normal       Mouth/Throat:      Mouth: Mucous membranes are moist    Eyes:      Extraocular Movements: Extraocular movements intact  Pupils: Pupils are equal, round, and reactive to light  Cardiovascular:      Rate and Rhythm: Normal rate and regular rhythm  Pulses: Normal pulses  Heart sounds: Murmur heard  No friction rub  Pulmonary:      Effort: Pulmonary effort is normal  No respiratory distress  Breath sounds: Normal breath sounds  No wheezing  Abdominal:      General: Abdomen is flat  Bowel sounds are normal  There is no distension  Palpations: Abdomen is soft  There is no mass  Tenderness: There is no abdominal tenderness  There is no guarding  Musculoskeletal:         General: Normal range of motion  Cervical back: Normal range of motion  Neurological:      General: No focal deficit present  Mental Status: She is alert and oriented to person, place, and time  Mental status is at baseline  Cranial Nerves: No cranial nerve deficit     Psychiatric:         Mood and Affect: Mood normal          Behavior: Behavior normal            Answers for HPI/ROS submitted by the patient on 4/17/2022  How would you rate your overall health?: good  Compared to last year, how is your physical health?: same  In general, how satisfied are you with your life?: very satisfied  Compared to last year, how is your eyesight?: same  Compared to last year, how is your hearing?: same  Compared to last year, how is your emotional/mental health?: same  How often is anger a problem for you?: never, rarely  How often do you feel unusually tired/fatigued?: sometimes  In the past 7 days, how much pain have you experienced?: none  In the past 6 months, have you lost or gained 10 pounds without trying?: No  One or more falls in the last year: No  In the past 6 months, have you accidentally leaked urine?: No  Do you have trouble with the stairs inside or outside your home?: No  Does your home have working smoke alarms?: Yes  Does your home have a carbon monoxide monitor?: No  Which safety hazards (if any) have you experienced in your home? Please select all that apply : none  How would you describe your current diet?  Please select all that apply : Low Carb  In addition to prescription medications, are you taking any over-the-counter supplements?: No  Can you manage your medications?: Yes  Are you currently taking any opioid medications?: No  Can you walk and transfer into and out of your bed and chair?: Yes  Can you dress and groom yourself?: Yes  Can you bathe or shower yourself?: Yes  Can you feed yourself?: Yes  Can you do your laundry/ housekeeping?: Yes  Can you manage your money, pay your bills, and track your expenses?: Yes  Can you make your own meals?: Yes  Can you do your own shopping?: Yes  Within the last 12 months, have you had any hospitalizations or Emergency Department visits?: No  Do you have a living will?: Yes  Do you have a Durable POA (Power of ) for healthcare decisions?: No  Do you have an Advanced Directive for end of life decisions?: No  How often have you used an illegal drug (including marijuana) or a prescription medication for non-medical reasons in the past year?: never  What is the typical number of drinks you consume in a day?: 0  What is the typical number of drinks you consume in a week?: 2  How often did you have a drink containing alcohol in the past year?: 2 to 4 times a month  How many drinks did you have on a typical day  when you were drinking in the past year?: 1 to 2  How often did you have 6 or more drinks on one occasion in the past year?: never

## 2022-04-18 NOTE — PROGRESS NOTES
Assessment and Plan:     Problem List Items Addressed This Visit        Cardiovascular and Mediastinum    Nonrheumatic aortic (valve) insufficiency    Aortic dilatation (HCC)    Essential hypertension       Other    Hyperlipidemia - Primary      Other Visit Diagnoses     Medicare annual wellness visit, subsequent               Preventive health issues were discussed with patient, and age appropriate screening tests were ordered as noted in patient's After Visit Summary  Personalized health advice and appropriate referrals for health education or preventive services given if needed, as noted in patient's After Visit Summary  History of Present Illness:     Patient presents for Medicare Annual Wellness visit    Patient Care Team:  Erendira Oneill MD as PCP - General (Internal Medicine)  Erendira Oneill MD as PCP - [address] [place] [address] (RTE)  MD John Vaughn MD     Problem List:     Patient Active Problem List   Diagnosis    Nonrheumatic aortic (valve) insufficiency    Sleep apnea    Vitamin B12 deficiency    Migraine with aura and without status migrainosus, not intractable    [facility]ortic dilatation ([facility] [address] [facility]    Essential hypertension    Hyperlipidemia    Obesity (BMI 30 0-34  9)    Obesity, morbid ([facility] [address] )    Stage 3a chronic kidney disease ([facility] [address] )    Vitamin D deficiency      Past Medical and Surgical History:     Past Medical History:   Diagnosis Date    Allergic rhinitis     Aortic regurgitation     HTN (hypertension)     Hyperlipidemia     Nonrheumatic aortic (valve) insufficiency     Sleep apnea     Vitamin B12 deficiency      Past Surgical History:   Procedure Laterality Date    BUNIONECTOMY      CHOLECYSTECTOMY      COLONOSCOPY  07/02/2018    DILATION AND CURETTAGE OF UTERUS      MAMMO (HISTORICAL)  03/21/2017 5/18/2018 and 3/21/2017     MN LAP,APPENDECTOMY N/A 8/2/2021    Procedure: APPENDECTOMY LAPAROSCOPIC;  Surgeon: Arturo Briceno MD;  Location: [facility]; Service: Colorectal    WISDOM TOOTH EXTRACTION        Family History:     Family History   Problem Relation Age of Onset    Coronary artery disease Mother     Kidney cancer Father     No Known Problems Sister     No Known Problems Sister     No Known Problems Sister       Social History:     Social History     Socioeconomic History    Marital status: /Civil Union     Spouse name: None    Number of children: 2    Years of education: None    Highest education level: None   Occupational History    None   Tobacco Use    Smoking status: Former Smoker     Types: Cigarettes     Quit date:      Years since quittin 3    Smokeless tobacco: Never Used   Vaping Use    Vaping Use: Never used   Substance and Sexual Activity    Alcohol use:  Yes     Alcohol/week: 5 0 standard drinks     Types: 5 Standard drinks or equivalent per week    Drug use: Never    Sexual activity: Not Currently   Other Topics Concern    None   Social History Narrative    2 daughters    Travel outside [place]: No      Social Determinants of Health     Financial Resource Strain: Not on file   Food Insecurity: Not on file   Transportation Needs: Not on file   Physical Activity: Not on file   Stress: Not on file   Social Connections: Not on file   Intimate Partner Violence: Not on file   Housing Stability: Not on file      Medications and Allergies:     Current Outpatient Medications   Medication Sig Dispense Refill    Calcium 600 MG tablet Take 2 tablets by mouth daily      calcium citrate-vitamin D (CITRACAL+D) 315-200 MG-UNIT per tablet Take 1 tablet by mouth 2 (two) times a day      cholecalciferol (VITAMIN D3) 1,000 units tablet Take 1,000 Units by mouth daily      cyanocobalamin (VITAMIN B-12) 500 mcg tablet Take 1 tablet by mouth daily      labetalol (NORMODYNE) 100 mg tablet Take 1 tablet (100 mg total) by mouth 2 (two) times a day 180 tablet 1    Multiple Vitamins-Minerals (MULTIVITAMIN ADULT) TABS Take 1 tablet by mouth daily      protriptyline (VIVACTIL) 5 MG tablet Take 1 tablet (5 mg total) by mouth daily at bedtime 90 tablet 1    rosuvastatin (CRESTOR) 5 mg tablet take 1 tablet by mouth once daily 90 tablet 1    trandolapril (MAVIK) 4 MG tablet Take 1 tablet (4 mg total) by mouth daily 90 tablet 1    verapamil (CALAN) 80 mg tablet Take 1 tablet (80 mg total) by mouth 3 (three) times a day 270 tablet 1     No current facility-administered medications for this visit  Allergies   Allergen Reactions    Penicillins Rash     Other reaction(s): PENICILLIN G POTASSIUM (PENICILLIN)        Immunizations:     Immunization History   Administered Date(s) Administered    COVID-19 PFIZER VACCINE 0 3 ML IM 02/24/2021, 03/19/2021, 10/03/2021, 10/03/2021    COVID-19 Pfizer vac (Esdras-sucrose, gray cap) 12 yr+ IM 04/12/2022, 04/12/2022    INFLUENZA 12/10/2014, 10/01/2015, 10/10/2016, 09/07/2021    Influenza Quadrivalent Preservative Free 3 years and older IM 09/30/2019    Influenza Split High Dose Preservative Free IM 09/08/2020    Influenza, high dose seasonal 0 7 mL 09/08/2020, 09/07/2021    Influenza, injectable, quadrivalent, preservative free 0 5 mL 09/30/2019    Pneumococcal Conjugate 13-Valent 03/10/2020    Pneumococcal Polysaccharide PPV23 12/15/2010, 08/18/2021    Tdap 05/20/2013    Zoster Vaccine Recombinant 04/14/2018    influenza, trivalent, adjuvanted 09/08/2020      Health Maintenance:         Topic Date Due    Hepatitis C Screening  Never done    Breast Cancer Screening: Mammogram  12/10/2022    Colorectal Cancer Screening  07/12/2024     There are no preventive care reminders to display for this patient  Medicare Health Risk Assessment:     /80 (BP Location: Left arm, Patient Position: Sitting, Cuff Size: Large)   Pulse 81   Temp 98 2 °F (36 8 °C) (Temporal)   Ht 5' 6" (1 676 m)   Wt 98 kg (216 lb)   SpO2 99%   BMI 34 86 kg/m²      Ivania Deleon is here for her Subsequent Wellness visit   Last Medicare Wellness visit information reviewed, patient interviewed and updates made to the record today  Health Risk Assessment:   Patient rates overall health as good  Patient feels that their physical health rating is same  Patient is very satisfied with their life  Eyesight was rated as same  Hearing was rated as same  Patient feels that their emotional and mental health rating is same  Patients states they are never, rarely angry  Patient states they are sometimes unusually tired/fatigued  Pain experienced in the last 7 days has been none  Patient states that she has experienced no weight loss or gain in last 6 months  Depression Screening:   PHQ-2 Score: 0      Fall Risk Screening: In the past year, patient has experienced: no history of falling in past year      Urinary Incontinence Screening:   Patient has not leaked urine accidently in the last six months  Home Safety:  Patient does not have trouble with stairs inside or outside of their home  Patient has working smoke alarms and has no working carbon monoxide detector  Home safety hazards include: none  Nutrition:   Current diet is Low Carb  Medications:   Patient is not currently taking any over-the-counter supplements  Patient is able to manage medications  Activities of Daily Living (ADLs)/Instrumental Activities of Daily Living (IADLs):   Walk and transfer into and out of bed and chair?: Yes  Dress and groom yourself?: Yes    Bathe or shower yourself?: Yes    Feed yourself?  Yes  Do your laundry/housekeeping?: Yes  Manage your money, pay your bills and track your expenses?: Yes  Make your own meals?: Yes    Do your own shopping?: Yes    Previous Hospitalizations:   Any hospitalizations or ED visits within the last 12 months?: No      Advance Care Planning:   Living will: Yes    Durable POA for healthcare: No    Advanced directive: No      PREVENTIVE SCREENINGS      Cardiovascular Screening:    General: Screening Not Indicated and History Lipid Disorder      Diabetes Screening:     General: Screening Current      Colorectal Cancer Screening:     General: Screening Current      Breast Cancer Screening:     General: Screening Current      Cervical Cancer Screening:    General: Screening Not Indicated      Lung Cancer Screening:     General: Screening Not Indicated    Screening, Brief Intervention, and Referral to Treatment (SBIRT)    Screening  Typical number of drinks in a day: 0  Typical number of drinks in a week: 2  Interpretation: Low risk drinking behavior  AUDIT-C Screenin) How often did you have a drink containing alcohol in the past year? 2 to 4 times a month  2) How many drinks did you have on a typical day when you were drinking in the past year? 1 to 2  3) How often did you have 6 or more drinks on one occasion in the past year? never    AUDIT-C Score: 2  Interpretation: Score 0-2 (female): Negative screen for alcohol misuse    Single Item Drug Screening:  How often have you used an illegal drug (including marijuana) or a prescription medication for non-medical reasons in the past year? never    Single Item Drug Screen Score: 0  Interpretation: Negative screen for possible drug use disorder    Other Counseling Topics:   Car/seat belt/driving safety, skin self-exam, sunscreen and regular weightbearing exercise and calcium and vitamin D intake         Jen Blackman MD

## 2022-04-19 ENCOUNTER — HOSPITAL ENCOUNTER (OUTPATIENT)
Dept: RADIOLOGY | Age: 67
Discharge: HOME | End: 2022-04-19
Payer: COMMERCIAL

## 2022-04-19 DIAGNOSIS — N18.31 STAGE 3A CHRONIC KIDNEY DISEASE (HCC): ICD-10-CM

## 2022-04-19 PROCEDURE — 76770 US EXAM ABDO BACK WALL COMP: CPT

## 2022-04-25 LAB
ALBUMIN/CREAT UR: 5 MCG/MG CREAT
ANA SER QL IF: NEGATIVE
APPEARANCE UR: CLEAR
BACTERIA UR QL AUTO: ABNORMAL /HPF
BILIRUB UR QL STRIP: NEGATIVE
COLOR UR: YELLOW
CREAT UR-MCNC: 43 MG/DL (ref 20–275)
CRP SERPL-MCNC: 2.9 MG/L
ERYTHROCYTE [SEDIMENTATION RATE] IN BLOOD BY WESTERGREN METHOD: 2 MM/H
GLUCOSE UR QL STRIP: NEGATIVE
HGB UR QL STRIP: NEGATIVE
HYALINE CASTS #/AREA URNS LPF: ABNORMAL /LPF
KETONES UR QL STRIP: NEGATIVE
LEUKOCYTE ESTERASE UR QL STRIP: ABNORMAL
MICROALBUMIN UR-MCNC: 0.2 MG/DL
NITRITE UR QL STRIP: NEGATIVE
PH UR STRIP: ABNORMAL [PH] (ref 5–8)
PROT UR QL STRIP: NEGATIVE
RBC #/AREA URNS HPF: ABNORMAL /HPF
SP GR UR STRIP: 1.01 (ref 1–1.03)
SQUAMOUS #/AREA URNS HPF: ABNORMAL /HPF
WBC #/AREA URNS HPF: ABNORMAL /HPF

## 2022-05-19 ENCOUNTER — HOSPITAL ENCOUNTER (OUTPATIENT)
Dept: RADIOLOGY | Age: 67
Discharge: HOME | End: 2022-05-19
Payer: COMMERCIAL

## 2022-05-19 DIAGNOSIS — Z87.891 HISTORY OF SMOKING 30 OR MORE PACK YEARS: ICD-10-CM

## 2022-05-19 PROCEDURE — 71271 CT THORAX LUNG CANCER SCR C-: CPT

## 2022-05-23 ENCOUNTER — OFFICE VISIT (OUTPATIENT)
Dept: INTERNAL MEDICINE CLINIC | Facility: CLINIC | Age: 67
End: 2022-05-23
Payer: COMMERCIAL

## 2022-05-23 ENCOUNTER — TELEPHONE (OUTPATIENT)
Dept: SURGICAL ONCOLOGY | Facility: CLINIC | Age: 67
End: 2022-05-23

## 2022-05-23 VITALS
HEIGHT: 66 IN | TEMPERATURE: 97.8 F | DIASTOLIC BLOOD PRESSURE: 80 MMHG | BODY MASS INDEX: 34.3 KG/M2 | WEIGHT: 213.4 LBS | SYSTOLIC BLOOD PRESSURE: 122 MMHG | HEART RATE: 82 BPM | OXYGEN SATURATION: 97 %

## 2022-05-23 DIAGNOSIS — R91.8 LUNG MASS: ICD-10-CM

## 2022-05-23 DIAGNOSIS — N18.31 STAGE 3A CHRONIC KIDNEY DISEASE (HCC): ICD-10-CM

## 2022-05-23 DIAGNOSIS — I35.1 NONRHEUMATIC AORTIC (VALVE) INSUFFICIENCY: ICD-10-CM

## 2022-05-23 DIAGNOSIS — I77.819 AORTIC DILATATION (HCC): ICD-10-CM

## 2022-05-23 DIAGNOSIS — E55.9 VITAMIN D DEFICIENCY: ICD-10-CM

## 2022-05-23 DIAGNOSIS — I10 ESSENTIAL HYPERTENSION: Primary | ICD-10-CM

## 2022-05-23 PROCEDURE — 99214 OFFICE O/P EST MOD 30 MIN: CPT | Performed by: INTERNAL MEDICINE

## 2022-05-23 RX ORDER — TRANDOLAPRIL TABLETS 4 MG/1
4 TABLET ORAL DAILY
Qty: 90 TABLET | Refills: 1 | Status: SHIPPED | OUTPATIENT
Start: 2022-05-23

## 2022-05-23 RX ORDER — LABETALOL 100 MG/1
100 TABLET, FILM COATED ORAL 2 TIMES DAILY
Qty: 180 TABLET | Refills: 1 | Status: SHIPPED | OUTPATIENT
Start: 2022-05-23

## 2022-05-23 NOTE — PROGRESS NOTES
Assessment/Plan:           1  Essential hypertension  Comments: This is well controlled continue current medical regimen  Orders:  -     labetalol (NORMODYNE) 100 mg tablet; Take 1 tablet (100 mg total) by mouth in the morning and 1 tablet (100 mg total) in the evening   -     trandolapril (MAVIK) 4 MG tablet; Take 1 tablet (4 mg total) by mouth in the morning   -     CBC and differential; Future  -     Comprehensive metabolic panel; Future  -     Urinalysis with microscopic    2  Stage 3a chronic kidney disease ([facility] [address] )  Comments:  Avoid nephrotoxic agents  Probablepertensive nephrosclerosis  Orders:  -     CBC and differential; Future  -     Comprehensive metabolic panel; Future  -     Urinalysis with microscopic    3  Vitamin D deficiency    4  Lung mass  Comments:  Etiology of this is uncertain  Malignancy is a possibility  Further imaging will be necessary  Orders:  -     Ambulatory Referral to Thoracic Oncology; Future    5  Aortic dilatation (HCC)    6  Nonrheumatic aortic (valve) insufficiency           1  Essential hypertension      2  Stage 3a chronic kidney disease ([facility] [address] )      3  Vitamin D deficiency         No problem-specific Assessment & Plan notes found for this encounter  Subjective:      Patient ID: Ervin Hung is a 78 y o  female  HPI    The following portions of the patient's history were reviewed and updated as appropriate: She  has a past medical history of Allergic rhinitis, Aortic regurgitation, HTN (hypertension), Hyperlipidemia, Nonrheumatic aortic (valve) insufficiency, Sleep apnea, and Vitamin B12 deficiency  She   Patient Active Problem List    Diagnosis Date Noted    Obesity, morbid ([facility] [address] ) 12/20/2021    Stage 3a chronic kidney disease ([facility] [address] ) 12/20/2021    Vitamin D deficiency 12/20/2021    Essential hypertension     Hyperlipidemia     Obesity (BMI 30 0-34  9)     Migraine with aura and without status migrainosus, not intractable 04/07/2021    Aortic dilatation ([address] ) 04/07/2021    Nonrheumatic aortic (valve) insufficiency     Sleep apnea     Vitamin B12 deficiency      She  has a past surgical history that includes Dilation and curettage of uterus; [place] tooth extraction; Bunionectomy; Cholecystectomy; Colonoscopy (07/02/2018); Mammo (historical) (03/21/2017); and pr lap,appendectomy (N/A, 8/2/2021)  Her family history includes Coronary artery disease in her mother; Kidney cancer in her father; No Known Problems in her sister, sister, and sister  She  reports that she quit smoking about 17 years ago  Her smoking use included cigarettes  She has never used smokeless tobacco  She reports current alcohol use of about 5 0 standard drinks of alcohol per week  She reports that she does not use drugs  Current Outpatient Medications   Medication Sig Dispense Refill    Calcium 600 MG tablet Take 2 tablets by mouth daily      calcium citrate-vitamin D (CITRACAL+D) 315-200 MG-UNIT per tablet Take 1 tablet by mouth 2 (two) times a day      cholecalciferol (VITAMIN D3) 1,000 units tablet Take 1,000 Units by mouth daily      cyanocobalamin (VITAMIN B-12) 500 mcg tablet Take 1 tablet by mouth daily      labetalol (NORMODYNE) 100 mg tablet Take 1 tablet (100 mg total) by mouth in the morning and 1 tablet (100 mg total) in the evening  180 tablet 1    Multiple Vitamins-Minerals (MULTIVITAMIN ADULT) TABS Take 1 tablet by mouth daily      protriptyline (VIVACTIL) 5 MG tablet Take 1 tablet (5 mg total) by mouth daily at bedtime 90 tablet 1    rosuvastatin (CRESTOR) 5 mg tablet Take 1 tablet (5 mg total) by mouth daily 90 tablet 1    trandolapril (MAVIK) 4 MG tablet Take 1 tablet (4 mg total) by mouth in the morning  90 tablet 1    verapamil (CALAN) 80 mg tablet Take 1 tablet (80 mg total) by mouth 3 (three) times a day 270 tablet 1     No current facility-administered medications for this visit       Current Outpatient Medications on File Prior to Visit   Medication Sig    Calcium 600 MG tablet Take 2 tablets by mouth daily    calcium citrate-vitamin D (CITRACAL+D) 315-200 MG-UNIT per tablet Take 1 tablet by mouth 2 (two) times a day    cholecalciferol (VITAMIN D3) 1,000 units tablet Take 1,000 Units by mouth daily    cyanocobalamin (VITAMIN B-12) 500 mcg tablet Take 1 tablet by mouth daily    Multiple Vitamins-Minerals (MULTIVITAMIN ADULT) TABS Take 1 tablet by mouth daily    protriptyline (VIVACTIL) 5 MG tablet Take 1 tablet (5 mg total) by mouth daily at bedtime    rosuvastatin (CRESTOR) 5 mg tablet Take 1 tablet (5 mg total) by mouth daily    verapamil (CALAN) 80 mg tablet Take 1 tablet (80 mg total) by mouth 3 (three) times a day    [DISCONTINUED] labetalol (NORMODYNE) 100 mg tablet Take 1 tablet (100 mg total) by mouth 2 (two) times a day    [DISCONTINUED] trandolapril (MAVIK) 4 MG tablet Take 1 tablet (4 mg total) by mouth daily     No current facility-administered medications on file prior to visit  She is allergic to penicillins       Review of Systems   Constitutional: Negative for appetite change, chills, fatigue and fever  HENT: Negative for sore throat and trouble swallowing  Eyes: Negative for redness  Respiratory: Negative for shortness of breath  Cardiovascular: Negative for chest pain and palpitations  Gastrointestinal: Negative for abdominal pain, constipation and diarrhea  Genitourinary: Negative for dysuria and hematuria  Musculoskeletal: Negative for back pain and neck pain  Skin: Negative for rash  Neurological: Negative for seizures, weakness and headaches  Hematological: Negative for adenopathy  Psychiatric/Behavioral: Negative for confusion  The patient is not nervous/anxious            Objective:      /80 (BP Location: Left arm, Patient Position: Sitting, Cuff Size: Large)   Pulse 82   Temp 97 8 °F (36 6 °C) (Temporal)   Ht 5' 6" (1 676 m)   Wt 96 8 kg (213 lb 6 4 oz)   SpO2 97% Comment: ra  BMI 34 44 kg/m²     Results Reviewed     None          Recent Results (from the past 1344 hour(s))   Microalbumin, Random Urine (W/Creatinine)    Collection Time: 04/21/22  9:28 AM   Result Value Ref Range    Creatinine, Urine 43 20 - 275 mg/dL    Microalbum  ,U,Random 0 2 See Note: mg/dL    Microalb/Creat Ratio 5 <30 mcg/mg creat   Sedimentation rate, automated    Collection Time: 04/21/22  9:28 AM   Result Value Ref Range    Sedimentation Rate 2 < OR = 30 mm/h   Urinalysis with microscopic    Collection Time: 04/21/22  9:28 AM   Result Value Ref Range    Color UA YELLOW YELLOW    Urine Appearance CLEAR CLEAR    Specific Gravity 1 008 1 001 - 1 035    Ph < OR = 5 0 5 0 - 8 0    Glucose, Urine NEGATIVE NEGATIVE    Bilirubin, Urine NEGATIVE NEGATIVE    Ketone, Urine NEGATIVE NEGATIVE    Blood, Urine NEGATIVE NEGATIVE    Protein, Urine NEGATIVE NEGATIVE    SL AMB NITRITES URINE, QUAL  NEGATIVE NEGATIVE    Leukocyte Esterase 1+ (A) NEGATIVE    SL AMB WBC, URINE 6-10 (A) < OR = 5 /HPF    RBC, Urine NONE SEEN < OR = 2 /HPF    Squamous Epithelial Cells 0-5 < OR = 5 /HPF    Bacteria, UA NONE SEEN NONE SEEN /HPF    Hyaline Casts NONE SEEN NONE SEEN /LPF   ROBERT Screen w/ Reflex to Titer/Pattern    Collection Time: 04/21/22  9:28 AM   Result Value Ref Range    ROBERT Screen, IFA NEGATIVE NEGATIVE   C-reactive protein    Collection Time: 04/21/22  9:28 AM   Result Value Ref Range    C-Reactive Protein, Quant 2 9 <8 0 mg/L        Physical Exam  Constitutional:       General: She is not in acute distress  Appearance: Normal appearance  She is obese  HENT:      Head: Normocephalic and atraumatic  Nose: Nose normal       Mouth/Throat:      Mouth: Mucous membranes are moist    Eyes:      Extraocular Movements: Extraocular movements intact  Pupils: Pupils are equal, round, and reactive to light  Cardiovascular:      Rate and Rhythm: Normal rate and regular rhythm  Pulses: Normal pulses  Heart sounds: Murmur heard  No friction rub  Pulmonary:      Effort: Pulmonary effort is normal  No respiratory distress  Breath sounds: Normal breath sounds  No wheezing  Abdominal:      General: Abdomen is flat  Bowel sounds are normal  There is no distension  Palpations: Abdomen is soft  There is no mass  Tenderness: There is no abdominal tenderness  There is no guarding  Musculoskeletal:         General: Normal range of motion  Neurological:      General: No focal deficit present  Mental Status: She is alert and oriented to person, place, and time  Mental status is at baseline  Cranial Nerves: No cranial nerve deficit     Psychiatric:         Mood and Affect: Mood normal          Behavior: Behavior normal

## 2022-05-23 NOTE — TELEPHONE ENCOUNTER
Thoracic SX Intake Form   Patient Details   Nikolai Tegaue     1955     Reason For Appointment   Who is Calling? Davide Canales   If not patient, Name? Who is the Referring Doctor? Dr pearson   Which surgeon is Patient referred to? What is the diagnosis? lungs   Has this diagnosis been confirmed by    imaging/ biopsy/surgery? If yes, what is the date it was done? Yes, imaging   Biopsy done at [place] [id number]? If not, where was it done? NO    Was imaging done, and was it done at [facility]? If not, where was it done? YES   Scheduling Information     What is the best call back number?   [phone]   Miscellaneous Information     Earlier appointment works better

## 2022-05-25 ENCOUNTER — OFFICE VISIT (OUTPATIENT)
Dept: CARDIAC SURGERY | Facility: CLINIC | Age: 67
End: 2022-05-25
Payer: COMMERCIAL

## 2022-05-25 VITALS
TEMPERATURE: 98 F | HEART RATE: 81 BPM | DIASTOLIC BLOOD PRESSURE: 76 MMHG | WEIGHT: 220.02 LBS | RESPIRATION RATE: 17 BRPM | BODY MASS INDEX: 35.36 KG/M2 | OXYGEN SATURATION: 97 % | SYSTOLIC BLOOD PRESSURE: 132 MMHG | HEIGHT: 66 IN

## 2022-05-25 DIAGNOSIS — R91.8 LUNG MASS: ICD-10-CM

## 2022-05-25 PROCEDURE — 99205 OFFICE O/P NEW HI 60 MIN: CPT | Performed by: THORACIC SURGERY (CARDIOTHORACIC VASCULAR SURGERY)

## 2022-08-12 ENCOUNTER — HOSPITAL ENCOUNTER (OUTPATIENT)
Dept: RADIOLOGY | Age: 67
Discharge: HOME | End: 2022-08-12
Payer: COMMERCIAL

## 2022-08-12 DIAGNOSIS — R91.8 LUNG MASS: ICD-10-CM

## 2022-08-12 PROCEDURE — G1004 CDSM NDSC: HCPCS

## 2022-08-12 PROCEDURE — 71250 CT THORAX DX C-: CPT

## 2022-08-16 LAB
ALBUMIN SERPL-MCNC: 4.3 G/DL (ref 3.6–5.1)
ALBUMIN/GLOB SERPL: 1.7 (CALC) (ref 1–2.5)
ALP SERPL-CCNC: 70 U/L (ref 37–153)
ALT SERPL-CCNC: 11 U/L (ref 6–29)
APPEARANCE UR: CLEAR
AST SERPL-CCNC: 18 U/L (ref 10–35)
BACTERIA UR QL AUTO: ABNORMAL /HPF
BASOPHILS # BLD AUTO: 50 CELLS/UL (ref 0–200)
BASOPHILS NFR BLD AUTO: 0.8 %
BILIRUB SERPL-MCNC: 0.6 MG/DL (ref 0.2–1.2)
BILIRUB UR QL STRIP: NEGATIVE
BUN SERPL-MCNC: 23 MG/DL (ref 7–25)
BUN/CREAT SERPL: NORMAL (CALC) (ref 6–22)
CALCIUM SERPL-MCNC: 9.3 MG/DL (ref 8.6–10.4)
CHLORIDE SERPL-SCNC: 102 MMOL/L (ref 98–110)
CO2 SERPL-SCNC: 26 MMOL/L (ref 20–32)
COLOR UR: YELLOW
CREAT SERPL-MCNC: 0.89 MG/DL (ref 0.5–1.05)
EOSINOPHIL # BLD AUTO: 120 CELLS/UL (ref 15–500)
EOSINOPHIL NFR BLD AUTO: 1.9 %
ERYTHROCYTE [DISTWIDTH] IN BLOOD BY AUTOMATED COUNT: 14.1 % (ref 11–15)
GFR/BSA.PRED SERPLBLD CYS-BASED-ARV: 71 ML/MIN/1.73M2
GLOBULIN SER CALC-MCNC: 2.5 G/DL (CALC) (ref 1.9–3.7)
GLUCOSE SERPL-MCNC: 90 MG/DL (ref 65–99)
GLUCOSE UR QL STRIP: NEGATIVE
HCT VFR BLD AUTO: 42.6 % (ref 35–45)
HGB BLD-MCNC: 14.1 G/DL (ref 11.7–15.5)
HGB UR QL STRIP: NEGATIVE
HYALINE CASTS #/AREA URNS LPF: ABNORMAL /LPF
KETONES UR QL STRIP: NEGATIVE
LEUKOCYTE ESTERASE UR QL STRIP: ABNORMAL
LYMPHOCYTES # BLD AUTO: 1796 CELLS/UL (ref 850–3900)
LYMPHOCYTES NFR BLD AUTO: 28.5 %
MCH RBC QN AUTO: 30.1 PG (ref 27–33)
MCHC RBC AUTO-ENTMCNC: 33.1 G/DL (ref 32–36)
MCV RBC AUTO: 90.8 FL (ref 80–100)
MONOCYTES # BLD AUTO: 441 CELLS/UL (ref 200–950)
MONOCYTES NFR BLD AUTO: 7 %
NEUTROPHILS # BLD AUTO: 3893 CELLS/UL (ref 1500–7800)
NEUTROPHILS NFR BLD AUTO: 61.8 %
NITRITE UR QL STRIP: NEGATIVE
PH UR STRIP: 5.5 [PH] (ref 5–8)
PLATELET # BLD AUTO: 203 THOUSAND/UL (ref 140–400)
PMV BLD REES-ECKER: 11.5 FL (ref 7.5–12.5)
POTASSIUM SERPL-SCNC: 4.6 MMOL/L (ref 3.5–5.3)
PROT SERPL-MCNC: 6.8 G/DL (ref 6.1–8.1)
PROT UR QL STRIP: NEGATIVE
RBC # BLD AUTO: 4.69 MILLION/UL (ref 3.8–5.1)
RBC #/AREA URNS HPF: ABNORMAL /HPF
SODIUM SERPL-SCNC: 137 MMOL/L (ref 135–146)
SP GR UR STRIP: 1 (ref 1–1.03)
SQUAMOUS #/AREA URNS HPF: ABNORMAL /HPF
WBC # BLD AUTO: 6.3 THOUSAND/UL (ref 3.8–10.8)
WBC #/AREA URNS HPF: ABNORMAL /HPF

## 2022-08-17 ENCOUNTER — OFFICE VISIT (OUTPATIENT)
Dept: CARDIAC SURGERY | Facility: CLINIC | Age: 67
End: 2022-08-17
Payer: COMMERCIAL

## 2022-08-17 VITALS
BODY MASS INDEX: 34.4 KG/M2 | DIASTOLIC BLOOD PRESSURE: 94 MMHG | SYSTOLIC BLOOD PRESSURE: 132 MMHG | OXYGEN SATURATION: 98 % | HEART RATE: 80 BPM | TEMPERATURE: 97.7 F | HEIGHT: 66 IN | RESPIRATION RATE: 17 BRPM | WEIGHT: 214.07 LBS

## 2022-08-17 DIAGNOSIS — R91.8 MASS OF UPPER LOBE OF LEFT LUNG: Primary | ICD-10-CM

## 2022-08-17 PROCEDURE — 99213 OFFICE O/P EST LOW 20 MIN: CPT | Performed by: THORACIC SURGERY (CARDIOTHORACIC VASCULAR SURGERY)

## 2022-08-17 NOTE — PROGRESS NOTES
Thoracic Follow-Up  Assessment/Plan:   58-year-old female with a distant 20 pack-year smoking history and a regular left upper lobe lung consolidation that is resolved on subsequent imaging    I had a good conversation with the patient today about this left upper lobe lung irregularity  Fortunately this resolved on repeat imaging after 3 months  She has no other concerning findings under scan at this point  Given her previous smoking history I do recommend repeat imaging  I recommend repeating a scan in 1 year at this time  We will schedule her scan now and see her back in our office afterwards  She will see us sooner with any worsening shortness of breath, persistent cough, fevers, chills or unintentional weight loss  Otherwise follow-up in a year after repeat scan  Duane Hazel, MD      Diagnoses and all orders for this visit:    Mass of upper lobe of left lung  -     CT chest wo contrast; Future          Thoracic History   Problem: irregular left upper lobe 3 cm opacity (Resolved August 2022)  Previous 20 pack-year smoking history    Procedure:    Pathology:        Subjective:    Patient ID: Maria D Platt is a 78 y o  female  [facility]  Kiara Vann comes back to our office with repeat imaging for her irregular left upper lobe lung nodule  She notes no major changes since last being seen in May  No changes in her breathing  No cough or hemoptysis  No unintentional weight loss  She still is not smoking  No other new complaints    The following portions of the patient's history were reviewed and updated as appropriate: allergies, current medications, past family history, past medical history, past social history, past surgical history and problem list     Review of Systems   Constitutional: Negative for activity change, appetite change, chills, diaphoresis, fatigue, fever and unexpected weight change  HENT: Negative  Eyes: Negative      Respiratory: Negative for apnea, cough, chest tightness, shortness of breath, wheezing and stridor  Cardiovascular: Negative for chest pain, palpitations and leg swelling  Gastrointestinal: Negative for abdominal distention, abdominal pain, blood in stool, constipation, diarrhea, nausea and vomiting  Endocrine: Negative  Genitourinary: Negative  Musculoskeletal: Negative  Skin: Negative  Allergic/Immunologic: Negative  Neurological: Negative  Hematological: Negative  Psychiatric/Behavioral: Negative  Objective:   Physical Exam  Vitals and nursing note reviewed  Constitutional:       General: She is not in acute distress  Appearance: Normal appearance  She is well-developed  She is not diaphoretic  HENT:      Head: Normocephalic and atraumatic  Mouth/Throat:      Pharynx: No oropharyngeal exudate  Eyes:      General: No scleral icterus  Conjunctiva/sclera: Conjunctivae normal       Pupils: Pupils are equal, round, and reactive to light  Neck:      Thyroid: No thyromegaly  Vascular: No JVD  Trachea: No tracheal deviation  Cardiovascular:      Rate and Rhythm: Normal rate and regular rhythm  Heart sounds: Normal heart sounds  No murmur heard  No friction rub  No gallop  Pulmonary:      Effort: Pulmonary effort is normal  No respiratory distress  Breath sounds: Normal breath sounds  No wheezing or rales  Comments: Normal work breathing on room air  Lungs clear to auscultation bilaterally  Chest:      Chest wall: No tenderness  Abdominal:      General: Bowel sounds are normal  There is no distension  Palpations: Abdomen is soft  There is no mass  Tenderness: There is no abdominal tenderness  There is no guarding or rebound  Musculoskeletal:         General: No tenderness or deformity  Normal range of motion  Cervical back: Normal range of motion and neck supple  Skin:     General: Skin is warm and dry  Coloration: Skin is not pale        Findings: No erythema or rash    Neurological:      General: No focal deficit present  Mental Status: She is alert and oriented to person, place, and time  Psychiatric:         Mood and Affect: Mood normal          Behavior: Behavior normal          Thought Content: Thought content normal          Judgment: Judgment normal      /94   Pulse 80   Temp 97 7 °F (36 5 °C)   Resp 17   Ht 5' 6" (1 676 m)   Wt 97 1 kg (214 lb 1 1 oz)   SpO2 98%   BMI 34 55 kg/m²     No Chest XR results available for this patient  CT chest wo contrast    Result Date: 8/16/2022  Narrative CT CHEST WITHOUT IV CONTRAST INDICATION:   R91 8: Other nonspecific abnormal finding of lung field  COMPARISON:  CT chest dated 5/19/2022  TECHNIQUE: CT examination of the chest was performed without intravenous contrast  Axial, sagittal, and coronal 2D reformatted images were created from the source data and submitted for interpretation  Radiation dose length product (DLP) for this visit:  364 mGy-cm   This examination, like all CT scans performed in the [facility], was performed utilizing techniques to minimize radiation dose exposure, including the use of iterative reconstruction and automated exposure control  FINDINGS: LUNGS:  No new or enlarging pulmonary nodules are identified  No focal airspace consolidation  The previously described irregular left upper lobe airspace opacity has resolved in the interim  There is no tracheal or endobronchial lesion  PLEURA:  Unremarkable  HEART/GREAT VESSELS: Heart is unremarkable for patient's age  No thoracic aortic aneurysm  MEDIASTINUM AND NICOL:  Unremarkable  CHEST WALL AND LOWER NECK:  Unremarkable  VISUALIZED STRUCTURES IN THE UPPER ABDOMEN:  Hepatic steatosis is noted  OSSEOUS STRUCTURES:  No acute fracture or destructive osseous lesion  Impression Interval resolution of the previously described irregular left upper lobe airspace opacity  No new or enlarging pulmonary nodules    No focal airspace consolidation  Workstation performed: [id number]     I extensively reviewed imaging with the patient in PACs  We compared her CT from 08/12/2022 to her previous lung cancer screening scan on 05/19/2022  The previous irregular left upper lobe lateral consolidation has completely resolved on her most recent imaging  There is no appreciable mass in the lungs  No lymphadenopathy  No other suspicious findings      Ilsa Mullen MD  Thoracic Surgeon    (Available by [facility] Text)

## 2022-08-29 ENCOUNTER — OFFICE VISIT (OUTPATIENT)
Dept: INTERNAL MEDICINE CLINIC | Facility: CLINIC | Age: 67
End: 2022-08-29
Payer: COMMERCIAL

## 2022-08-29 VITALS
WEIGHT: 209 LBS | SYSTOLIC BLOOD PRESSURE: 122 MMHG | OXYGEN SATURATION: 98 % | HEART RATE: 87 BPM | DIASTOLIC BLOOD PRESSURE: 80 MMHG | BODY MASS INDEX: 33.59 KG/M2 | TEMPERATURE: 97.7 F | HEIGHT: 66 IN

## 2022-08-29 DIAGNOSIS — M70.61 TROCHANTERIC BURSITIS OF RIGHT HIP: ICD-10-CM

## 2022-08-29 DIAGNOSIS — E78.2 MIXED HYPERLIPIDEMIA: ICD-10-CM

## 2022-08-29 DIAGNOSIS — I35.1 NONRHEUMATIC AORTIC (VALVE) INSUFFICIENCY: Primary | ICD-10-CM

## 2022-08-29 DIAGNOSIS — G43.109 MIGRAINE WITH AURA AND WITHOUT STATUS MIGRAINOSUS, NOT INTRACTABLE: ICD-10-CM

## 2022-08-29 DIAGNOSIS — I10 ESSENTIAL HYPERTENSION: ICD-10-CM

## 2022-08-29 DIAGNOSIS — E55.9 VITAMIN D DEFICIENCY: ICD-10-CM

## 2022-08-29 DIAGNOSIS — G47.33 OBSTRUCTIVE SLEEP APNEA SYNDROME: ICD-10-CM

## 2022-08-29 DIAGNOSIS — M25.559 HIP PAIN: ICD-10-CM

## 2022-08-29 DIAGNOSIS — N18.31 STAGE 3A CHRONIC KIDNEY DISEASE (HCC): ICD-10-CM

## 2022-08-29 PROCEDURE — 99215 OFFICE O/P EST HI 40 MIN: CPT | Performed by: INTERNAL MEDICINE

## 2022-08-29 RX ORDER — SENNOSIDES 8.6 MG
650 CAPSULE ORAL EVERY 8 HOURS PRN
Qty: 30 TABLET | Refills: 0 | Status: SHIPPED | OUTPATIENT
Start: 2022-08-29

## 2022-08-29 RX ORDER — VERAPAMIL HYDROCHLORIDE 80 MG/1
80 TABLET ORAL 3 TIMES DAILY
Qty: 270 TABLET | Refills: 1 | Status: SHIPPED | OUTPATIENT
Start: 2022-08-29 | End: 2022-11-27

## 2022-08-29 NOTE — PROGRESS NOTES
Assessment/Plan:           1  Nonrheumatic aortic (valve) insufficiency  Comments: This is being monitored  Last echocardiogram was reviewed  2  Essential hypertension  Comments:  Continue labetalol trandolapril and verapamil  Orders:  -     verapamil (CALAN) 80 mg tablet; Take 1 tablet (80 mg total) by mouth 3 (three) times a day    3  Stage 3a chronic kidney disease ([facility] [address] )  Comments: This has improved  Continue to avoid nephrotoxic agents  4  Vitamin D deficiency  Comments:  Continue supplementation  5  Trochanteric bursitis of right hip  Comments:  Tylenol recommended  Physical therapy will be started if not improved consider injection  Orders:  -     Ambulatory Referral to Physical Therapy; Future  -     acetaminophen (TYLENOL) 650 mg CR tablet; Take 1 tablet (650 mg total) by mouth every 8 (eight) hours as needed for mild pain    6  Migraine with aura and without status migrainosus, not intractable  Comments:  Continue protriptyline  7  Hip pain  Comments:  X-ray was ordered to rule out osteoarthritis  Orders:  -     XR hip/pelv 2-3 vws right if performed; Future; Expected date: 08/29/2022    8  Obstructive sleep apnea syndrome  Comments:  Continue CPAP usage  9  Mixed hyperlipidemia  Comments:  Continue rosuvastatin  1  Essential hypertension      2  Stage 3a chronic kidney disease ([facility] [address] )         No problem-specific Assessment & Plan notes found for this encounter  Subjective:      Patient ID: Tessie Martinez is a 78 y o  female  HPI    The following portions of the patient's history were reviewed and updated as appropriate: She  has a past medical history of Allergic rhinitis, Aortic regurgitation, HTN (hypertension), Hyperlipidemia, Nonrheumatic aortic (valve) insufficiency, Sleep apnea, and Vitamin B12 deficiency    She   Patient Active Problem List    Diagnosis Date Noted    Obesity, morbid ([facility] [address] ) 12/20/2021    Vitamin D deficiency 12/20/2021    Essential hypertension  Hyperlipidemia     Obesity (BMI 30 0-34  9)     Migraine with aura and without status migrainosus, not intractable 04/07/2021    Aortic dilatation ([address] ) 04/07/2021    Nonrheumatic aortic (valve) insufficiency     Sleep apnea     Vitamin B12 deficiency      She  has a past surgical history that includes Dilation and curettage of uterus; [place] tooth extraction; Bunionectomy; Cholecystectomy; Colonoscopy (07/02/2018); Mammo (historical) (03/21/2017); and pr lap,appendectomy (N/A, 8/2/2021)  Her family history includes Coronary artery disease in her mother; Kidney cancer in her father; No Known Problems in her sister, sister, and sister  She  reports that she quit smoking about 17 years ago  Her smoking use included cigarettes  She has never used smokeless tobacco  She reports current alcohol use of about 5 0 standard drinks of alcohol per week  She reports that she does not use drugs  Current Outpatient Medications   Medication Sig Dispense Refill    acetaminophen (TYLENOL) 650 mg CR tablet Take 1 tablet (650 mg total) by mouth every 8 (eight) hours as needed for mild pain 30 tablet 0    Calcium 600 MG tablet Take 2 tablets by mouth daily      calcium citrate-vitamin D (CITRACAL+D) 315-200 MG-UNIT per tablet Take 1 tablet by mouth 2 (two) times a day      cholecalciferol (VITAMIN D3) 1,000 units tablet Take 1,000 Units by mouth daily      cyanocobalamin (VITAMIN B-12) 500 mcg tablet Take 1 tablet by mouth daily      labetalol (NORMODYNE) 100 mg tablet Take 1 tablet (100 mg total) by mouth in the morning and 1 tablet (100 mg total) in the evening   180 tablet 1    Multiple Vitamins-Minerals (MULTIVITAMIN ADULT) TABS Take 1 tablet by mouth daily      protriptyline (VIVACTIL) 5 MG tablet Take 1 tablet (5 mg total) by mouth daily at bedtime 90 tablet 1    rosuvastatin (CRESTOR) 5 mg tablet Take 1 tablet (5 mg total) by mouth daily 90 tablet 1    trandolapril (MAVIK) 4 MG tablet Take 1 tablet (4 mg total) by mouth in the morning  90 tablet 1    verapamil (CALAN) 80 mg tablet Take 1 tablet (80 mg total) by mouth 3 (three) times a day 270 tablet 1     No current facility-administered medications for this visit  Current Outpatient Medications on File Prior to Visit   Medication Sig    Calcium 600 MG tablet Take 2 tablets by mouth daily    calcium citrate-vitamin D (CITRACAL+D) 315-200 MG-UNIT per tablet Take 1 tablet by mouth 2 (two) times a day    cholecalciferol (VITAMIN D3) 1,000 units tablet Take 1,000 Units by mouth daily    cyanocobalamin (VITAMIN B-12) 500 mcg tablet Take 1 tablet by mouth daily    labetalol (NORMODYNE) 100 mg tablet Take 1 tablet (100 mg total) by mouth in the morning and 1 tablet (100 mg total) in the evening   Multiple Vitamins-Minerals (MULTIVITAMIN ADULT) TABS Take 1 tablet by mouth daily    protriptyline (VIVACTIL) 5 MG tablet Take 1 tablet (5 mg total) by mouth daily at bedtime    rosuvastatin (CRESTOR) 5 mg tablet Take 1 tablet (5 mg total) by mouth daily    trandolapril (MAVIK) 4 MG tablet Take 1 tablet (4 mg total) by mouth in the morning   [DISCONTINUED] verapamil (CALAN) 80 mg tablet Take 1 tablet (80 mg total) by mouth 3 (three) times a day     No current facility-administered medications on file prior to visit  She is allergic to penicillins       Review of Systems   Constitutional: Negative for appetite change, chills, fatigue and fever  HENT: Negative for sore throat and trouble swallowing  Eyes: Negative for redness  Respiratory: Negative for shortness of breath  Cardiovascular: Negative for chest pain and palpitations  Gastrointestinal: Negative for abdominal pain, constipation and diarrhea  Genitourinary: Negative for dysuria and hematuria  Musculoskeletal: Negative for back pain and neck pain  Right hip pain  Skin: Negative for rash  Neurological: Negative for seizures, weakness and headaches     Hematological: Negative for adenopathy  Psychiatric/Behavioral: Negative for confusion  The patient is not nervous/anxious            Objective:      /80 (BP Location: Right arm, Patient Position: Sitting, Cuff Size: Adult)   Pulse 87   Temp 97 7 °F (36 5 °C) (Temporal)   Ht 5' 6" (1 676 m)   Wt 94 8 kg (209 lb)   SpO2 98%   BMI 33 73 kg/m²     Results Reviewed     None          Recent Results (from the past 1344 hour(s))   Comprehensive metabolic panel    Collection Time: 08/16/22  9:12 AM   Result Value Ref Range    Glucose, Random 90 65 - 99 mg/dL    BUN 23 7 - 25 mg/dL    Creatinine 0 89 0 50 - 1 05 mg/dL    eGFR 71 > OR = 60 mL/min/1 73m2    SL AMB BUN/CREATININE RATIO NOT APPLICABLE 6 - 22 (calc)    Sodium 137 135 - 146 mmol/L    Potassium 4 6 3 5 - 5 3 mmol/L    Chloride 102 98 - 110 mmol/L    CO2 26 20 - 32 mmol/L    Calcium 9 3 8 6 - 10 4 mg/dL    Protein, Total 6 8 6 1 - 8 1 g/dL    Albumin 4 3 3 6 - 5 1 g/dL    Globulin 2 5 1 9 - 3 7 g/dL (calc)    Albumin/Globulin Ratio 1 7 1 0 - 2 5 (calc)    TOTAL BILIRUBIN 0 6 0 2 - 1 2 mg/dL    Alkaline Phosphatase 70 37 - 153 U/L    AST 18 10 - 35 U/L    ALT 11 6 - 29 U/L   CBC and differential    Collection Time: 08/16/22  9:12 AM   Result Value Ref Range    White Blood Cell Count 6 3 3 8 - 10 8 Thousand/uL    Red Blood Cell Count 4 69 3 80 - 5 10 Million/uL    Hemoglobin 14 1 11 7 - 15 5 g/dL    HCT 42 6 35 0 - 45 0 %    MCV 90 8 80 0 - 100 0 fL    MCH 30 1 27 0 - 33 0 pg    MCHC 33 1 32 0 - 36 0 g/dL    RDW 14 1 11 0 - 15 0 %    Platelet Count 645 217 - 400 Thousand/uL    SL AMB MPV 11 5 7 5 - 12 5 fL    Neutrophils (Absolute) 3,893 1,500 - 7,800 cells/uL    Lymphocytes (Absolute) 1,796 850 - 3,900 cells/uL    Monocytes (Absolute) 441 200 - 950 cells/uL    Eosinophils (Absolute) 120 15 - 500 cells/uL    Basophils ABS 50 0 - 200 cells/uL    Neutrophils 61 8 %    Lymphocytes 28 5 %    Monocytes 7 0 %    Eosinophils 1 9 %    Basophils PCT 0 8 %   UA (URINE) with reflex to Scope    Collection Time: 08/16/22  9:12 AM   Result Value Ref Range    Color UA YELLOW YELLOW    Urine Appearance CLEAR CLEAR    Specific Gravity 1 004 1 001 - 1 035    Ph 5 5 5 0 - 8 0    Glucose, Urine NEGATIVE NEGATIVE    Bilirubin, Urine NEGATIVE NEGATIVE    Ketone, Urine NEGATIVE NEGATIVE    Blood, Urine NEGATIVE NEGATIVE    Protein, Urine NEGATIVE NEGATIVE    SL AMB NITRITES URINE, QUAL  NEGATIVE NEGATIVE    Leukocyte Esterase TRACE (A) NEGATIVE    SL AMB WBC, URINE 0-5 < OR = 5 /HPF    RBC, Urine NONE SEEN < OR = 2 /HPF    Squamous Epithelial Cells NONE SEEN < OR = 5 /HPF    Bacteria, UA NONE SEEN NONE SEEN /HPF    Hyaline Casts NONE SEEN NONE SEEN /LPF    Comment(s)          Physical Exam  Constitutional:       General: She is not in acute distress  Appearance: Normal appearance  HENT:      Head: Normocephalic and atraumatic  Right Ear: Tympanic membrane normal       Left Ear: Tympanic membrane normal       Nose: Nose normal       Mouth/Throat:      Mouth: Mucous membranes are moist    Eyes:      Extraocular Movements: Extraocular movements intact  Pupils: Pupils are equal, round, and reactive to light  Cardiovascular:      Rate and Rhythm: Normal rate and regular rhythm  Pulses: Normal pulses  Heart sounds: Murmur heard  No friction rub  Pulmonary:      Effort: Pulmonary effort is normal  No respiratory distress  Breath sounds: Normal breath sounds  No wheezing  Abdominal:      General: Abdomen is flat  Bowel sounds are normal  There is no distension  Palpations: Abdomen is soft  There is no mass  Tenderness: There is no abdominal tenderness  There is no guarding  Musculoskeletal:         General: Tenderness present  Normal range of motion  Cervical back: Normal range of motion  Comments: Right trochanteric area tenderness  Skin:     General: Skin is warm  Neurological:      General: No focal deficit present        Mental Status: She is alert and oriented to person, place, and time  Mental status is at baseline  Cranial Nerves: No cranial nerve deficit  Psychiatric:         Mood and Affect: Mood normal          Behavior: Behavior normal        BMI Counseling: Body mass index is 33 73 kg/m²  The BMI is above normal  Nutrition recommendations include reducing portion sizes

## 2022-09-06 ENCOUNTER — EVALUATION (OUTPATIENT)
Dept: PHYSICAL THERAPY | Facility: OTHER | Age: 67
End: 2022-09-06
Payer: COMMERCIAL

## 2022-09-06 DIAGNOSIS — M16.11 OSTEOARTHRITIS OF RIGHT HIP, UNSPECIFIED OSTEOARTHRITIS TYPE: ICD-10-CM

## 2022-09-06 DIAGNOSIS — M54.16 LUMBAR BACK PAIN WITH RADICULOPATHY AFFECTING RIGHT LOWER EXTREMITY: Primary | ICD-10-CM

## 2022-09-06 PROCEDURE — 97140 MANUAL THERAPY 1/> REGIONS: CPT

## 2022-09-06 PROCEDURE — 97161 PT EVAL LOW COMPLEX 20 MIN: CPT

## 2022-09-06 PROCEDURE — 97112 NEUROMUSCULAR REEDUCATION: CPT

## 2022-09-06 NOTE — PROGRESS NOTES
PT Evaluation     Today's date: 2022  Patient name: Karuna Pennington  : 1955  MRN: [id number]  Referring provider: Amelia Schwab, MD  Dx:   Encounter Diagnosis     ICD-10-CM    1  Lumbar back pain with radiculopathy affecting right lower extremity  M54 16 Ambulatory Referral to Physical Therapy    Tylenol recommended  Physical therapy will be started if not improved consider injection  2  Osteoarthritis of right hip, unspecified osteoarthritis type  M16 11                   Assessment  Assessment details: Karuna Pennington is a pleasant 78 y o  female who presents with R sided hip pain  Patient's greatest concerns are worry over not knowing what's wrong and fear of not being able to keep active  Pt reports noticing hip pain around  without HEIDY  She said the pain fluctuates, sometimes she wakes up with it and sometimes she doesn't  The pain is near the greater trochanter of her R hip and sometimes shoots down her R leg into her calf  When the pain is bad, she is unable to do anything weight-bearing, including walking, getting up from a chair, getting out of bed, rolling in bed, any household tasks, heaving lifting, reaching overhead, negotiating stairs up > down  The pain wakes pt up in the middle of the night when she rolls onto her right side, she is unable to reposition and fall back asleep  This happens 3-4x per week  Alleviating factors include ice, Tylenol arthritis, and sitting with a L trunk lean  Pt denies history of back, previous hip, knee, or ankle issues  X-ray was ordered if PT does not help  There was once instance where she felt a click in her hip that lasted 1-2 minutes without pain  Pt denies numbness and tingling  Pt denies bowel or bladder issues  Denies recent fever or infection  Family history of cancer with dad  She has noticed she has gotten dizzy described as lightheaded with no pattern  Pt states nothing changed in her medical history that would have caused this   Pt's goals for therapy include decreasing pain while walking (pt likes to walk 2 miles every morning), be able to perform household tasks, and climb stairs and hang flags without pain  Pt is retired  Objectively, clinical testing revealed positive signs for hip OA  Pt responded well to flexion-based exercises, as it completely relieved her hip pain  Of note, pt was TTP R QL and P-A lumbar spine and sacrum, decreased lumbar ROM, decreased hip strength B, (+) R CAMPOS, (+) R scour, (+) R ASLR, fair balance B, and poor foot control during gait  Pt may be experiencing these symptoms and objective findings based on clinical presentation of lumbar radiculopathy with possible concurrent hip OA  No further referral is necessary at this time based upon examination results  Pt would benefit from skilled physical therapy services to address current deficits, improve quality of life, and restore PLOF with transition to home exercise program when appropriate  Positive prognostic indicators include positive attitude toward recovery and low symptom irritability  Negative prognostic indicators include symptom behavior, hypertension, obesity, and other comorbidities  Primary Impairments:  1) decreased lumbar ROM  2) decreased B hip strength  3) R hip pain    Function Based Goals:  Patient will be independent with HEP upon discharge  Patient will be able to independently manage symptoms upon discharge  Patient will resume prior level of function and home ADLs with minimal to no symptoms upon discharge  Patient will be able to walk for 2 miles without symptoms upon discharge  Patient will be able to negotiate stairs and hang flags with minimal to no symptoms upon discharge  Impairment Based Goals:  Patient will increase lumbar ROM to WNL in 3 weeks  Patient will increase B hip strength by at least 1/2 MMT grade in 3 weeks  Patient will increase B SLS balance to good in 4 weeks    Patient will demonstrate equal CAMPOS testing in 4 weeks          Impairments: abnormal coordination, abnormal muscle firing, abnormal muscle tone, abnormal or restricted ROM, abnormal movement, impaired balance, impaired physical strength, lacks appropriate home exercise program, pain with function and poor posture     Symptom irritability: lowBarriers to therapy:       Understanding of Dx/Px/POC: good   Prognosis: good    Plan  Plan details: 2x a week for 6-8 weeks with HEP  Patient would benefit from: skilled physical therapy  Referral necessary: No  Planned therapy interventions: abdominal trunk stabilization, activity modification, balance, body mechanics training, breathing training, coordination, flexibility, functional ROM exercises, home exercise program, therapeutic exercise, therapeutic activities, stretching, strengthening, postural training, patient education, neuromuscular re-education, motor coordination training, massage, manual therapy and joint mobilization  Frequency: 2x week  Duration in weeks: 12  Treatment plan discussed with: patient        Subjective Evaluation    History of Present Illness  Date of onset: 7/3/2022  Quality of life: good    Pain  Current pain rating: 3  At best pain rating: 3  At worst pain rating: 10  Location: R hip, thigh, and calf  Quality: burning  Relieving factors: ice, medications, relaxation, rest and support  Aggravating factors: walking, stair climbing, running, standing and sitting  Progression: improved    Social Support  Lives with: spouse ()    Employment status: not working    Diagnostic Tests  No diagnostic tests performed  Treatments  No previous or current treatments  Patient Goals  Patient goals for therapy: decreased pain, improved balance, increased motion, return to sport/leisure activities, independence with ADLs/IADLs and increased strength          Objective     General Comments:      Hip Comments   Squat: anterior weight shift, arch collapse B, pain in knees  SL Balance: L   fair    R Fair, p! Gait: overpronation during midstance B, lateral trunk lean ipsilateral during midstance B, p! Posture: FHP, rounded shoulders  Palpation: TTP R QL     Spine ROM:  Flexion: 50% solomon,  p! Extension: 0% solomon, p! SB: L    25% solomon    R    25% solomon  Rot: L    25% solomon    R    25% solomon, p! In back, "pinching"  Repeated motions: standing flexion  Improved     standing extension   worsened  Repeated DKTC: improved     LQS:  L4 reflex: L   2+    R   2+  S1 reflex: L  2+     R    2+  Dermatomes: L  WNL    R   WNL   Myotomes: L  WNL     R   WNL      Hip ROM:  Flexion: L  resistance at end range      R   Resistance at end range, p! In hip flexor  ER: L  WNL     R   WNL  IR: L  WNL     R   Severely solomon     Hip MMT:  Flexion: L    5/5     R    5/5  ER: L    4+/5, p!     R    4+/5, p! IR: L    5/5     R    4/5, p! Extension glute: L    3+/5     R   3+/5, p! In back  Extension hamstring: L    4+/5     R    4+/5  Abduction: L    4+/5     R    4/5, p!      Scour: L   (-)    R  (+)  CAMPOS: L  (-)     R  (+)  PSLR: L  (-)      R   (-)  ASLR: L  (-)     R   (+)     P-A: hypomobile L1-L5,  Pain L1-L5 and sacrum  Soft tissue: TTP  R QL  LLD: even              Precautions: HTN, CKD      Manuals 9/6            Lumbar P-A grIII-IV KA            STM R QL nv                         Assess slump test, ely, and hamstring length  nv            Neuro Re-Ed             DKTC 2x10            Standing flexion 2x10            Seated flexion 2x10            Bridges              Clamshells              SLS nv            Kneeling weight shift in ER             Arch lifts             Standing marches             TrA             Ther Ex             Bike nv            Leg press             Squats                                                                               Ther Activity                                       Gait Training                                       Modalities

## 2022-09-09 ENCOUNTER — OFFICE VISIT (OUTPATIENT)
Dept: PHYSICAL THERAPY | Facility: OTHER | Age: 67
End: 2022-09-09
Payer: COMMERCIAL

## 2022-09-09 DIAGNOSIS — M54.16 LUMBAR BACK PAIN WITH RADICULOPATHY AFFECTING RIGHT LOWER EXTREMITY: Primary | ICD-10-CM

## 2022-09-09 DIAGNOSIS — M16.11 OSTEOARTHRITIS OF RIGHT HIP, UNSPECIFIED OSTEOARTHRITIS TYPE: ICD-10-CM

## 2022-09-09 PROCEDURE — 97140 MANUAL THERAPY 1/> REGIONS: CPT

## 2022-09-09 PROCEDURE — 97112 NEUROMUSCULAR REEDUCATION: CPT

## 2022-09-09 PROCEDURE — 97110 THERAPEUTIC EXERCISES: CPT

## 2022-09-09 NOTE — PROGRESS NOTES
Daily Note     Today's date: 2022  Patient name: Ayesha Edmondson  : 1955  MRN: [id number]  Referring provider: Sreedhar Robert MD  Dx:   Encounter Diagnosis     ICD-10-CM    1  Lumbar back pain with radiculopathy affecting right lower extremity  M54 16    2  Osteoarthritis of right hip, unspecified osteoarthritis type  M16 11                   Subjective: "My hip feels better but my back is sore "      Objective: See treatment diary below      Assessment: Pt reported 2-3/10 in hip and 4/10 in back at start of session  After repeat flexion in standing, pain completely alleviated and remained absent for the remainder of the session  Hamstring length assessed, R tighter than L  R hip IR ROM improved with belt mobilizations and brought pt relief  CAMPOS test still positive, improved with kneeling ER weight shift  Will continue to encourage lumbar flexion and improve muscle imbalances and hip ROM deficits as tolerated  Plan: Continue per plan of care        Precautions: HTN, CKD      Manuals            Lumbar P-A grIII-IV KA            STM R QL and glute nv KA           Lateral belt mobilizations grIII-IV  KA neutral, ER, IR            Assess slump test, ely, and hamstring length  nv Assess slump and ely nv           Hamstring stretch  KA           Neuro Re-Ed             DKTC 2x10            Standing flexion 2x10 3x10           Seated flexion 2x10            Bridges              Clamshells              SLS nv nv           Kneeling weight shift in ER  10"x10           Arch lifts             Standing marches             TrA             Ther Ex             treadmill nv 8'           Leg press  nv           Squats                                                                               Ther Activity                                       Gait Training                                       Modalities

## 2022-09-13 ENCOUNTER — OFFICE VISIT (OUTPATIENT)
Dept: PHYSICAL THERAPY | Facility: OTHER | Age: 67
End: 2022-09-13
Payer: COMMERCIAL

## 2022-09-13 DIAGNOSIS — M54.16 LUMBAR BACK PAIN WITH RADICULOPATHY AFFECTING RIGHT LOWER EXTREMITY: Primary | ICD-10-CM

## 2022-09-13 DIAGNOSIS — M16.11 OSTEOARTHRITIS OF RIGHT HIP, UNSPECIFIED OSTEOARTHRITIS TYPE: ICD-10-CM

## 2022-09-13 PROCEDURE — 97112 NEUROMUSCULAR REEDUCATION: CPT

## 2022-09-13 PROCEDURE — 97110 THERAPEUTIC EXERCISES: CPT

## 2022-09-13 PROCEDURE — 97140 MANUAL THERAPY 1/> REGIONS: CPT

## 2022-09-13 NOTE — PROGRESS NOTES
Daily Note     Today's date: 2022  Patient name: Carol Helms  : 1955  MRN: [id number]  Referring provider: Rolo Smith MD  Dx:   Encounter Diagnosis     ICD-10-CM    1  Lumbar back pain with radiculopathy affecting right lower extremity  M54 16    2  Osteoarthritis of right hip, unspecified osteoarthritis type  M16 11                 Total treatment time 8513-2801, 1-on- 2802-9084  Subjective: "I am feeling pretty good  I have less pain overall "      Objective: See treatment diary below      Assessment: Pt started with 3/10 pain in the back and 1/10 in the hip  After standing flexion, decreased to 2/10 in the back and 1/10 in the hip  Discussed sleeping with pillow under side, as pt states the pain still wakes her up in the middle of the night  Added seated AROM ER/IR of hip to HEP  Will ad self belt mob nv  Continue to progress with flexion based exercises, core stabilization, and LE strengthening as tolerated  Plan: Continue per plan of care        Precautions: HTN, CKD  [id number]         Manuals           Lumbar P-A grIII-IV KA            STM R QL and glute nv KA KA glute          Lateral belt mobilizations grIII-IV  KA neutral, ER, IR  KA neutral, ER, IR           Assess slump test, ely, and hamstring length  nv Assess slump and ely nv           Hamstring and quad stretch  KA KA          Neuro Re-Ed             DKTC 2x10            Standing flexion 2x10 3x10 3x10          Seated flexion 2x10            Bridges              Clamshells              SLS nv nv           Kneeling weight shift in ER  10"x10 10"x10          Arch lifts             Standing marches             TrA - posterior pelvic tilts   5"x20          Ther Ex             treadmill nv 8' 8'          Leg press  nv           Squats                                                                               Ther Activity                                       Gait Training Modalities

## 2022-09-15 ENCOUNTER — OFFICE VISIT (OUTPATIENT)
Dept: PHYSICAL THERAPY | Facility: OTHER | Age: 67
End: 2022-09-15
Payer: COMMERCIAL

## 2022-09-15 DIAGNOSIS — M54.16 LUMBAR BACK PAIN WITH RADICULOPATHY AFFECTING RIGHT LOWER EXTREMITY: Primary | ICD-10-CM

## 2022-09-15 DIAGNOSIS — M16.11 OSTEOARTHRITIS OF RIGHT HIP, UNSPECIFIED OSTEOARTHRITIS TYPE: ICD-10-CM

## 2022-09-15 PROCEDURE — 97110 THERAPEUTIC EXERCISES: CPT

## 2022-09-15 PROCEDURE — 97140 MANUAL THERAPY 1/> REGIONS: CPT

## 2022-09-15 PROCEDURE — 97112 NEUROMUSCULAR REEDUCATION: CPT

## 2022-09-15 NOTE — PROGRESS NOTES
Daily Note     Today's date: 9/15/2022  Patient name: Nain Rodriguez  : 1955  MRN: [id number]  Referring provider: Michelle Cosme MD  Dx:   Encounter Diagnosis     ICD-10-CM    1  Lumbar back pain with radiculopathy affecting right lower extremity  M54 16    2  Osteoarthritis of right hip, unspecified osteoarthritis type  M16 11                 Total treatment time 2362-6433, 1-on- 4784-6119  Subjective: "Everything feels good, I just have a little bit of pain in the hip  No pain in the back "      Objective: See treatment diary below      Assessment: Pt stated last session that she often wakes up with pain in the middle of the night  Instructed on propping pillows underneath her low back for support, she said this helped a lot since the last session  Standing flexion abolished symptoms at start of session  Demonstrated self belt mob for home  Continues to have slight increase in CAMPOS test with glute STM and kneeling weight shift  Added leg press without issues  No symptoms at conclusion of treatment  Continue to progress lumbar flexion ROM, hip ROM, core stabilization, LE strengthening, and balance as tolerated  Plan: Continue per plan of care        Precautions: HTN, CKD  [id number]         Manuals 9/6 9/9 9/13 9/15         Lumbar P-A grIII-IV KA            STM R QL and glute nv KA KA glute KA glute         Lateral belt mobilizations grIII-IV  KA neutral, ER, IR  KA neutral, ER, IR  KA neutral, ER, IR          Assess slump test, ely, and hamstring length  nv Assess slump and ely nv           Hamstring and quad stretch  KA KA KA         Neuro Re-Ed             DKTC 2x10            Standing flexion 2x10 3x10 3x10 3x10         Seated flexion 2x10            Bridges              Clamshells              SLS nv nv           Kneeling weight shift in ER  10"x10 10"x10 10"x10 10# KB         Arch lifts             Standing marches             TrA - posterior pelvic tilts   5"x20 5"x30         Ther Ex treadmill nv 8' 8' 8'         Leg press  nv  70# 2x10         Squats                                                                               Ther Activity                                       Gait Training                                       Modalities

## 2022-09-20 ENCOUNTER — OFFICE VISIT (OUTPATIENT)
Dept: PHYSICAL THERAPY | Facility: OTHER | Age: 67
End: 2022-09-20
Payer: COMMERCIAL

## 2022-09-20 DIAGNOSIS — M16.11 OSTEOARTHRITIS OF RIGHT HIP, UNSPECIFIED OSTEOARTHRITIS TYPE: ICD-10-CM

## 2022-09-20 DIAGNOSIS — M54.16 LUMBAR BACK PAIN WITH RADICULOPATHY AFFECTING RIGHT LOWER EXTREMITY: Primary | ICD-10-CM

## 2022-09-20 PROCEDURE — 97140 MANUAL THERAPY 1/> REGIONS: CPT

## 2022-09-20 PROCEDURE — 97112 NEUROMUSCULAR REEDUCATION: CPT

## 2022-09-20 PROCEDURE — 97110 THERAPEUTIC EXERCISES: CPT

## 2022-09-20 NOTE — PROGRESS NOTES
Daily Note     Today's date: 2022  Patient name: Janet Zhu  : 1955  MRN: [id number]  Referring provider: Fabián Núñez MD  Dx:   Encounter Diagnosis     ICD-10-CM    1  Lumbar back pain with radiculopathy affecting right lower extremity  M54 16    2  Osteoarthritis of right hip, unspecified osteoarthritis type  M16 11                   Subjective: "My back feels good, I don't really have any pain at the moment "      Objective: See treatment diary below      Assessment: Pt asked about needing an x-ray for her back- educated that there will probably be normal degenerative changes on the x-ray unrelated to her current pain  She has progressed well with therapy and has show consistent improvements- advised to continue with PT until pain and function are completely resolved or until she has an appointment with the physician for a follow-up  Decreased tissue tension in R glute today, and improved R IR ROM  Self mob with belt at home going well per patient  Progressed core stabilization and hip ER/IR rot ROM today without any issues  Will continue to progress hip ROM, LE strengthening, balance, and core stabilization as tolerated  Plan: Continue per plan of care        Precautions: HTN, CKD  [id number]         Manuals 9/6 9/9 9/13 9/15 9/20        Lumbar P-A grIII-IV KA            STM R QL and glute nv KA KA glute KA glute K glute        Lateral belt mobilizations grIII-IV  KA neutral, ER, IR  KA neutral, ER, IR  KA neutral, ER, IR  KA neutral, ER, IR         Assess slump test, ely, and hamstring length  nv Assess slump and ely nv           Hamstring and quad stretch  KA KA KA KA        Neuro Re-Ed             DKTC 2x10            Standing flexion 2x10 3x10 3x10 3x10         Seated flexion 2x10            Bridges      nv        Clamshells      nv        SLS nv nv   nv        Kneeling weight shift in ER  10"x10 10"x10 10"x10 10# KB 10"x10 10# KB        Arch lifts             Standing  TrA - posterior pelvic tilts   5"x20 5"x30 5"x20    marches 10x B        ER/IR rot ROM on stool     10"x10 ea        IR clamshells     nv        Ther Ex             treadmill nv 8' 8' 8' 8'        Leg press  nv  70# 2x10 70# 3x10        Squats                                                                               Ther Activity                                       Gait Training                                       Modalities

## 2022-09-22 ENCOUNTER — OFFICE VISIT (OUTPATIENT)
Dept: PHYSICAL THERAPY | Facility: OTHER | Age: 67
End: 2022-09-22
Payer: COMMERCIAL

## 2022-09-22 DIAGNOSIS — M54.16 LUMBAR BACK PAIN WITH RADICULOPATHY AFFECTING RIGHT LOWER EXTREMITY: Primary | ICD-10-CM

## 2022-09-22 DIAGNOSIS — M16.11 OSTEOARTHRITIS OF RIGHT HIP, UNSPECIFIED OSTEOARTHRITIS TYPE: ICD-10-CM

## 2022-09-22 PROCEDURE — 97112 NEUROMUSCULAR REEDUCATION: CPT | Performed by: PHYSICAL THERAPIST

## 2022-09-22 PROCEDURE — 97110 THERAPEUTIC EXERCISES: CPT | Performed by: PHYSICAL THERAPIST

## 2022-09-22 PROCEDURE — 97140 MANUAL THERAPY 1/> REGIONS: CPT | Performed by: PHYSICAL THERAPIST

## 2022-09-22 NOTE — PROGRESS NOTES
Daily Note     Today's date: 2022  Patient name: Duyen Dela Cruz  : 1955  MRN: [id number]  Referring provider: Saul Paul MD  Dx:   Encounter Diagnosis     ICD-10-CM    1  Lumbar back pain with radiculopathy affecting right lower extremity  M54 16    2  Osteoarthritis of right hip, unspecified osteoarthritis type  M16 11          Subjective: patient reports she is feeling good today  She offers no new complaints  Objective: See treatment diary below      Assessment: Patient tolerated treatment well  She demonstrates good technique throughout the session  Able to progress program as noted below with no reports of increased symptoms  She would benefit from continued PT  Plan: Continue per plan of care        Precautions: HTN, CKD  [id number]         Manuals 9/6 9/9 9/13 9/15 9/20 9/22       Lumbar P-A grIII-IV KA            STM R QL and glute nv KA KA glute KA glute K glute SK       Lateral belt mobilizations grIII-IV  KA neutral, ER, IR  KA neutral, ER, IR  KA neutral, ER, IR  KA neutral, ER, IR         Assess slump test, ely, and hamstring length  nv Assess slump and ely nv           Hamstring and quad stretch  KA KA KA KA SK       Neuro Re-Ed             DKTC 2x10            Standing flexion 2x10 3x10 3x10 3x10         Seated flexion 2x10            Bridges      nv 3" x10       Clamshells      nv 5" x10 ea       SLS nv nv   nv nv       Kneeling weight shift in ER  10"x10 10"x10 10"x10 10# KB 10"x10 10# KB 10"x10 10# KB       Arch lifts             Standing marches             TrA - posterior pelvic tilts   5"x20 5"x30 5"x20    marches 10x B 5"x20    marches 10x B       ER/IR rot ROM on stool     10"x10 ea 10"x10 ea       IR clamshells     nv        Ther Ex             treadmill nv 8' 8' 8' 8' 8'       Leg press  nv  70# 2x10 70# 3x10 80# 2x10       Squats                                                                               Ther Activity                                       Gait Training                                       Modalities

## 2022-09-27 ENCOUNTER — OFFICE VISIT (OUTPATIENT)
Dept: PHYSICAL THERAPY | Facility: OTHER | Age: 67
End: 2022-09-27
Payer: COMMERCIAL

## 2022-09-27 DIAGNOSIS — M54.16 LUMBAR BACK PAIN WITH RADICULOPATHY AFFECTING RIGHT LOWER EXTREMITY: Primary | ICD-10-CM

## 2022-09-27 DIAGNOSIS — M16.11 OSTEOARTHRITIS OF RIGHT HIP, UNSPECIFIED OSTEOARTHRITIS TYPE: ICD-10-CM

## 2022-09-27 PROCEDURE — 97110 THERAPEUTIC EXERCISES: CPT

## 2022-09-27 PROCEDURE — 97112 NEUROMUSCULAR REEDUCATION: CPT

## 2022-09-27 NOTE — PROGRESS NOTES
Daily Note     Today's date: 2022  Patient name: Nain Rodriguez  : 1955  MRN: [id number]  Referring provider: Michelle Cosme MD  Dx:   Encounter Diagnosis     ICD-10-CM    1  Lumbar back pain with radiculopathy affecting right lower extremity  M54 16    2  Osteoarthritis of right hip, unspecified osteoarthritis type  M16 11                   Subjective: "I have been painfree since Tuesday  I am feeling good "      Objective: See treatment diary below      Assessment:  Discussed discharge plans with patient, pt feeling good overall and has maintained exercises at home to manage her symptoms  Added IR clams and balance today, tolerated well without presence of symptoms  IR PROM significantly improved, CAMPOS test on R side nearly equal to L  Plan to discharge next week as long as symptoms remain absent and pt is able to maintain strength and ROM on own at home  Plan: Continue per plan of care        Precautions: HTN, CKD  [id number]         Manuals 9/6 9/9 9/13 9/15 9/20 9/22 9/27      Lumbar P-A grIII-IV KA            STM R QL and glute nv KA KA glute KA glute K glute SK       Lateral belt mobilizations grIII-IV  KA neutral, ER, IR  KA neutral, ER, IR  KA neutral, ER, IR  KA neutral, ER, IR         Assess slump test, ely, and hamstring length  nv Assess slump and ely nv           Hamstring and quad stretch  KA KA KA KA SK       Neuro Re-Ed             DKTC 2x10            Standing flexion 2x10 3x10 3x10 3x10         Seated flexion 2x10            Bridges      nv 3" x10 10"x10      Clamshells      nv 5" x10 ea 10"x10      SLS nv nv   nv nv 30"x3 airex      Kneeling weight shift in ER  10"x10 10"x10 10"x10 10# KB 10"x10 10# KB 10"x10 10# KB       Arch lifts             Standing marches             TrA - posterior pelvic tilts   5"x20 5"x30 5"x20    marches 10x B 5"x20    marches 10x B 5"x10          ER/IR rot ROM on stool     10"x10 ea 10"x10 ea 10"x10 ea      IR clamshells     nv  10"x10 B      Ther Ex treadmill nv 8' 8' 8' 8' 8' 8'      Leg press  nv  70# 2x10 70# 3x10 80# 2x10 80# 3x10      Squats                                                                               Ther Activity                                       Gait Training                                       Modalities

## 2022-09-29 ENCOUNTER — APPOINTMENT (OUTPATIENT)
Dept: PHYSICAL THERAPY | Facility: OTHER | Age: 67
End: 2022-09-29
Payer: COMMERCIAL

## 2022-10-05 ENCOUNTER — OFFICE VISIT (OUTPATIENT)
Dept: PHYSICAL THERAPY | Facility: OTHER | Age: 67
End: 2022-10-05
Payer: COMMERCIAL

## 2022-10-05 DIAGNOSIS — M16.11 OSTEOARTHRITIS OF RIGHT HIP, UNSPECIFIED OSTEOARTHRITIS TYPE: ICD-10-CM

## 2022-10-05 DIAGNOSIS — M54.16 LUMBAR BACK PAIN WITH RADICULOPATHY AFFECTING RIGHT LOWER EXTREMITY: Primary | ICD-10-CM

## 2022-10-05 PROCEDURE — 97110 THERAPEUTIC EXERCISES: CPT

## 2022-10-05 PROCEDURE — 97112 NEUROMUSCULAR REEDUCATION: CPT

## 2022-10-05 NOTE — PROGRESS NOTES
PT Discharge     Today's date: 10/5/2022  Patient name: Anjana Adkins  : 1955  MRN: [id number]  Referring provider: Alex Russell MD  Dx:   Encounter Diagnosis     ICD-10-CM    1  Lumbar back pain with radiculopathy affecting right lower extremity  M54 16    2  Osteoarthritis of right hip, unspecified osteoarthritis type  M16 11                   Subjective: "I am feeling good, I have not had any pain  I feel ready for discharge "      Objective: See treatment diary below      Assessment: Pt reports 100% improvement since initial evaluation, she occasionally gets a twinge of pain but it is rare  No difficulties with any functional activities to report  All goals met on evaluation  Reviewed HEP and answered all questions  Pt discharged to HEP at this time       Plan: Discharged to HEP     Precautions: HTN, CKD  [id number]         Manuals 9/6 9/9 9/13 9/15 9/20 9/22 9/27 10     Lumbar P-A grIII-IV KA            STM R QL and glute nv KA KA glute KA glute K glute SK       Lateral belt mobilizations grIII-IV  KA neutral, ER, IR  KA neutral, ER, IR  KA neutral, ER, IR  KA neutral, ER, IR         Assess slump test, ely, and hamstring length  nv Assess slump and ely nv           Hamstring and quad stretch  KA KA KA KA SK       Neuro Re-Ed             DKTC 2x10            Standing flexion 2x10 3x10 3x10 3x10         Seated flexion 2x10            Bridges      nv 3" x10 10"x10 10"x10     Clamshells      nv 5" x10 ea 10"x10 10"x10     SLS nv nv   nv nv 30"x3 airex 30"x3 airex     Kneeling weight shift in ER  10"x10 10"x10 10"x10 10# KB 10"x10 10# KB 10"x10 10# KB       Arch lifts             Standing marches             TrA - posterior pelvic tilts   5"x20 5"x30 5"x20    marches 10x B 5"x20    marches 10x B 5"x10          ER/IR rot ROM on stool     10"x10 ea 10"x10 ea 10"x10 ea      IR clamshells     nv  10"x10 B      Ther Ex             treadmill nv 8' 8' 8' 8' 8' 8' 8'     Leg press  nv  70# 2x10 70# 3x10 80# 2x10 80# 3x10 90# 3x10     Squats         2x10                                                                      Ther Activity                                       Gait Training                                       Modalities

## 2022-10-12 ENCOUNTER — TELEPHONE (OUTPATIENT)
Dept: CARDIOLOGY CLINIC | Facility: CLINIC | Age: 67
End: 2022-10-12

## 2022-10-12 NOTE — TELEPHONE ENCOUNTER
Da Mckinnon has an upcoming dental appointment  She said her dentist would like to know if she requires a pre dental antibiotic  Da Mckinnon said she remembers you telling her that she does not need, but the dentist would like you to state it here  Please advise and I will fax your response

## 2022-11-14 DIAGNOSIS — G43.109 MIGRAINE WITH AURA AND WITHOUT STATUS MIGRAINOSUS, NOT INTRACTABLE: ICD-10-CM

## 2022-11-14 RX ORDER — PROTRIPTYLINE HYDROCHLORIDE 5 MG/1
5 TABLET, FILM COATED ORAL
Qty: 90 TABLET | Refills: 1 | Status: SHIPPED | OUTPATIENT
Start: 2022-11-14

## 2022-12-13 DIAGNOSIS — I10 ESSENTIAL HYPERTENSION: ICD-10-CM

## 2022-12-13 RX ORDER — LABETALOL 100 MG/1
100 TABLET, FILM COATED ORAL 2 TIMES DAILY
Qty: 180 TABLET | Refills: 1 | Status: SHIPPED | OUTPATIENT
Start: 2022-12-13

## 2023-01-24 ENCOUNTER — RA CDI HCC (OUTPATIENT)
Dept: OTHER | Facility: HOSPITAL | Age: 68
End: 2023-01-24

## 2023-01-24 NOTE — PROGRESS NOTES
Darin [facility] 74  coding opportunities       Chart reviewed, no opportunity found:   [address]        Patients Insurance     Medicare Insurance: [employer]

## 2023-01-30 ENCOUNTER — OFFICE VISIT (OUTPATIENT)
Dept: INTERNAL MEDICINE CLINIC | Facility: CLINIC | Age: 68
End: 2023-01-30

## 2023-01-30 VITALS
DIASTOLIC BLOOD PRESSURE: 74 MMHG | TEMPERATURE: 97.8 F | OXYGEN SATURATION: 99 % | WEIGHT: 225 LBS | HEIGHT: 66 IN | BODY MASS INDEX: 36.16 KG/M2 | HEART RATE: 73 BPM | SYSTOLIC BLOOD PRESSURE: 122 MMHG

## 2023-01-30 DIAGNOSIS — I35.1 NONRHEUMATIC AORTIC (VALVE) INSUFFICIENCY: ICD-10-CM

## 2023-01-30 DIAGNOSIS — E55.9 VITAMIN D DEFICIENCY: ICD-10-CM

## 2023-01-30 DIAGNOSIS — E78.2 MIXED HYPERLIPIDEMIA: ICD-10-CM

## 2023-01-30 DIAGNOSIS — I10 ESSENTIAL HYPERTENSION: Primary | ICD-10-CM

## 2023-01-30 DIAGNOSIS — I77.819 AORTIC DILATATION (HCC): ICD-10-CM

## 2023-01-30 DIAGNOSIS — E66.01 OBESITY, MORBID (HCC): ICD-10-CM

## 2023-01-30 DIAGNOSIS — G43.109 MIGRAINE WITH AURA AND WITHOUT STATUS MIGRAINOSUS, NOT INTRACTABLE: ICD-10-CM

## 2023-01-30 RX ORDER — TRANDOLAPRIL TABLETS 4 MG/1
4 TABLET ORAL DAILY
Qty: 90 TABLET | Refills: 1 | Status: SHIPPED | OUTPATIENT
Start: 2023-01-30

## 2023-01-30 RX ORDER — CHLORHEXIDINE GLUCONATE 0.12 MG/ML
RINSE ORAL
COMMUNITY
Start: 2023-01-27

## 2023-01-30 NOTE — PROGRESS NOTES
Assessment/Plan:           1  Essential hypertension  Comments: This is well controlled continue current medical regimen  Orders:  -     trandolapril (MAVIK) 4 MG tablet; Take 1 tablet (4 mg total) by mouth daily  -     CBC and differential; Future  -     Comprehensive metabolic panel; Future  -     Urinalysis with microscopic  -     Lipid panel; Future    2  Migraine with aura and without status migrainosus, not intractable  Comments: This is stable continue protriptyline  3  Nonrheumatic aortic (valve) insufficiency    4  Vitamin D deficiency    5  Aortic dilatation (HCC)    6  Obesity, morbid ([address] )    7  Mixed hyperlipidemia  -     Lipid panel; Future         1  Essential hypertension      2  Migraine with aura and without status migrainosus, not intractable      3  Nonrheumatic aortic (valve) insufficiency         No problem-specific Assessment & Plan notes found for this encounter  Subjective:      Patient ID: Ralph Max is a 75 y o  female  HPI    The following portions of the patient's history were reviewed and updated as appropriate: She  has a past medical history of Allergic rhinitis, Aortic regurgitation, HTN (hypertension), Hyperlipidemia, Nonrheumatic aortic (valve) insufficiency, Sleep apnea, and Vitamin B12 deficiency  She   Patient Active Problem List    Diagnosis Date Noted   • Obesity, morbid ([address] ) 12/20/2021   • Vitamin D deficiency 12/20/2021   • Essential hypertension    • Hyperlipidemia    • Obesity (BMI 30 0-34  9)    • Migraine with aura and without status migrainosus, not intractable 04/07/2021   • Aortic dilatation ([address] ) 04/07/2021   • Nonrheumatic aortic (valve) insufficiency    • Sleep apnea    • Vitamin B12 deficiency      She  has a past surgical history that includes Dilation and curettage of uterus; [place] tooth extraction; Bunionectomy; Cholecystectomy; Colonoscopy (07/02/2018); Mammo (historical) (03/21/2017); and pr laparoscopic appendectomy (N/A, 8/2/2021)    Her family history includes Coronary artery disease in her mother; Kidney cancer in her father; No Known Problems in her sister, sister, and sister  She  reports that she quit smoking about 18 years ago  Her smoking use included cigarettes  She has never used smokeless tobacco  She reports current alcohol use of about 5 0 standard drinks per week  She reports that she does not use drugs  Current Outpatient Medications   Medication Sig Dispense Refill   • acetaminophen (TYLENOL) 650 mg CR tablet Take 1 tablet (650 mg total) by mouth every 8 (eight) hours as needed for mild pain 30 tablet 0   • Calcium 600 MG tablet Take 2 tablets by mouth daily     • calcium citrate-vitamin D (CITRACAL+D) 315-200 MG-UNIT per tablet Take 1 tablet by mouth 2 (two) times a day     • chlorhexidine (PERIDEX) 0 12 % solution RINSE BY MOUTH TWICE A DAY     • cholecalciferol (VITAMIN D3) 1,000 units tablet Take 1,000 Units by mouth daily 5,000 units daily twice a day     • cyanocobalamin (VITAMIN B-12) 500 mcg tablet Take 1 tablet by mouth daily     • labetalol (NORMODYNE) 100 mg tablet Take 1 tablet (100 mg total) by mouth 2 (two) times a day 180 tablet 1   • Multiple Vitamins-Minerals (MULTIVITAMIN ADULT) TABS Take 1 tablet by mouth daily     • protriptyline (VIVACTIL) 5 MG tablet Take 1 tablet (5 mg total) by mouth daily at bedtime 90 tablet 1   • rosuvastatin (CRESTOR) 5 mg tablet Take 1 tablet (5 mg total) by mouth daily 90 tablet 1   • trandolapril (MAVIK) 4 MG tablet Take 1 tablet (4 mg total) by mouth daily 90 tablet 1   • verapamil (CALAN) 80 mg tablet Take 1 tablet (80 mg total) by mouth 3 (three) times a day 270 tablet 1     No current facility-administered medications for this visit       Current Outpatient Medications on File Prior to Visit   Medication Sig   • acetaminophen (TYLENOL) 650 mg CR tablet Take 1 tablet (650 mg total) by mouth every 8 (eight) hours as needed for mild pain   • Calcium 600 MG tablet Take 2 tablets by mouth daily   • calcium citrate-vitamin D (CITRACAL+D) 315-200 MG-UNIT per tablet Take 1 tablet by mouth 2 (two) times a day   • chlorhexidine (PERIDEX) 0 12 % solution RINSE BY MOUTH TWICE A DAY   • cholecalciferol (VITAMIN D3) 1,000 units tablet Take 1,000 Units by mouth daily 5,000 units daily twice a day   • cyanocobalamin (VITAMIN B-12) 500 mcg tablet Take 1 tablet by mouth daily   • labetalol (NORMODYNE) 100 mg tablet Take 1 tablet (100 mg total) by mouth 2 (two) times a day   • Multiple Vitamins-Minerals (MULTIVITAMIN ADULT) TABS Take 1 tablet by mouth daily   • protriptyline (VIVACTIL) 5 MG tablet Take 1 tablet (5 mg total) by mouth daily at bedtime   • rosuvastatin (CRESTOR) 5 mg tablet Take 1 tablet (5 mg total) by mouth daily   • [DISCONTINUED] trandolapril (MAVIK) 4 MG tablet Take 1 tablet (4 mg total) by mouth in the morning  • verapamil (CALAN) 80 mg tablet Take 1 tablet (80 mg total) by mouth 3 (three) times a day     No current facility-administered medications on file prior to visit  She is allergic to penicillins       Review of Systems   Constitutional: Negative for appetite change, chills, fatigue and fever  HENT: Negative for sore throat and trouble swallowing  Eyes: Negative for redness  Respiratory: Negative for shortness of breath  Cardiovascular: Negative for chest pain and palpitations  Gastrointestinal: Negative for abdominal pain, constipation and diarrhea  Genitourinary: Negative for dysuria and hematuria  Musculoskeletal: Negative for back pain and neck pain  Skin: Negative for rash  Neurological: Negative for seizures, weakness and headaches  Hematological: Negative for adenopathy  Psychiatric/Behavioral: Negative for confusion  The patient is not nervous/anxious            Objective:      /74 (BP Location: Left arm, Patient Position: Sitting, Cuff Size: Large)   Pulse 73   Temp 97 8 °F (36 6 °C) (Temporal)   Ht 5' 6" (1 676 m)   Wt 102 kg (225 lb) SpO2 99%   BMI 36 32 kg/m²     Results Reviewed     None          No results found for this or any previous visit (from the past 1344 hour(s))  Physical Exam  Constitutional:       General: She is not in acute distress  Appearance: Normal appearance  She is obese  HENT:      Head: Normocephalic and atraumatic  Nose: Nose normal       Mouth/Throat:      Mouth: Mucous membranes are moist    Eyes:      Extraocular Movements: Extraocular movements intact  Pupils: Pupils are equal, round, and reactive to light  Cardiovascular:      Rate and Rhythm: Normal rate and regular rhythm  Pulses: Normal pulses  Heart sounds: Murmur heard  No friction rub  Pulmonary:      Effort: Pulmonary effort is normal  No respiratory distress  Breath sounds: Normal breath sounds  No wheezing  Abdominal:      General: Abdomen is flat  Bowel sounds are normal  There is no distension  Palpations: Abdomen is soft  There is no mass  Tenderness: There is no abdominal tenderness  There is no guarding  Musculoskeletal:         General: Normal range of motion  Cervical back: Normal range of motion  Neurological:      General: No focal deficit present  Mental Status: She is alert and oriented to person, place, and time  Mental status is at baseline  Cranial Nerves: No cranial nerve deficit     Psychiatric:         Mood and Affect: Mood normal          Behavior: Behavior normal

## 2023-02-24 ENCOUNTER — HOSPITAL ENCOUNTER (OUTPATIENT)
Dept: NON INVASIVE DIAGNOSTICS | Facility: CLINIC | Age: 68
Discharge: HOME | End: 2023-02-24

## 2023-02-24 VITALS
SYSTOLIC BLOOD PRESSURE: 122 MMHG | WEIGHT: 225 LBS | HEIGHT: 66 IN | DIASTOLIC BLOOD PRESSURE: 74 MMHG | HEART RATE: 73 BPM | BODY MASS INDEX: 36.16 KG/M2

## 2023-02-24 DIAGNOSIS — I10 ESSENTIAL (PRIMARY) HYPERTENSION: ICD-10-CM

## 2023-02-24 DIAGNOSIS — I10 ESSENTIAL HYPERTENSION: ICD-10-CM

## 2023-02-24 DIAGNOSIS — I51.7 LVH (LEFT VENTRICULAR HYPERTROPHY): ICD-10-CM

## 2023-02-24 DIAGNOSIS — I35.9 AORTIC VALVE DISEASE: ICD-10-CM

## 2023-02-24 LAB
AORTIC ROOT: 3.2 CM
AORTIC VALVE MEAN VELOCITY: 16 M/S
APICAL FOUR CHAMBER EJECTION FRACTION: 44 %
ASCENDING AORTA: 3.7 CM
AV AREA BY CONTINUOUS VTI: 1.8 CM2
AV AREA PEAK VELOCITY: 1.8 CM2
AV LVOT MEAN GRADIENT: 4 MMHG
AV LVOT PEAK GRADIENT: 7 MMHG
AV MEAN GRADIENT: 11 MMHG
AV PEAK GRADIENT: 21 MMHG
AV REGURGITATION PRESSURE HALF TIME: 472 MS
AV VALVE AREA: 1.83 CM2
AV VELOCITY RATIO: 0.58
DOP CALC AO PEAK VEL: 2.31 M/S
DOP CALC AO VTI: 52.91 CM
DOP CALC LVOT AREA: 3.14 CM2
DOP CALC LVOT DIAMETER: 2 CM
DOP CALC LVOT PEAK VEL VTI: 30.83 CM
DOP CALC LVOT PEAK VEL: 1.33 M/S
DOP CALC LVOT STROKE INDEX: 47.6 ML/M2
DOP CALC LVOT STROKE VOLUME: 96.81
E WAVE DECELERATION TIME: 240 MS
FRACTIONAL SHORTENING: 31 (ref 28–44)
INTERVENTRICULAR SEPTUM IN DIASTOLE (PARASTERNAL SHORT AXIS VIEW): 1.4 CM
INTERVENTRICULAR SEPTUM: 1.4 CM (ref 0.6–1.1)
LAAS-AP2: 18.6 CM2
LAAS-AP4: 19 CM2
LEFT ATRIUM SIZE: 3.7 CM
LEFT INTERNAL DIMENSION IN SYSTOLE: 3.4 CM (ref 2.1–4)
LEFT VENTRICULAR INTERNAL DIMENSION IN DIASTOLE: 4.9 CM (ref 3.5–6)
LEFT VENTRICULAR POSTERIOR WALL IN END DIASTOLE: 1.4 CM
LEFT VENTRICULAR STROKE VOLUME: 63 ML
LVSV (TEICH): 63 ML
MV E'TISSUE VEL-SEP: 7 CM/S
MV PEAK A VEL: 1.39 M/S
MV PEAK E VEL: 128 CM/S
MV STENOSIS PRESSURE HALF TIME: 70 MS
MV VALVE AREA P 1/2 METHOD: 3.14
RA PRESSURE ESTIMATED: 3 MMHG
RIGHT ATRIUM AREA SYSTOLE A4C: 14.5 CM2
RIGHT VENTRICLE ID DIMENSION: 3.1 CM
SL CV AV DECELERATION TIME RETROGRADE: 1627 MS
SL CV AV PEAK GRADIENT RETROGRADE: 63 MMHG
SL CV LEFT ATRIUM LENGTH A2C: 5.4 CM
SL CV LV EF: 60
SL CV PED ECHO LEFT VENTRICLE DIASTOLIC VOLUME (MOD BIPLANE) 2D: 111 ML
SL CV PED ECHO LEFT VENTRICLE SYSTOLIC VOLUME (MOD BIPLANE) 2D: 48 ML
TRICUSPID ANNULAR PLANE SYSTOLIC EXCURSION: 2.4 CM

## 2023-02-24 RX ORDER — VERAPAMIL HYDROCHLORIDE 80 MG/1
80 TABLET ORAL 3 TIMES DAILY
Qty: 270 TABLET | Refills: 1 | Status: SHIPPED | OUTPATIENT
Start: 2023-02-24 | End: 2023-05-25

## 2023-03-15 DIAGNOSIS — E78.2 MIXED HYPERLIPIDEMIA: ICD-10-CM

## 2023-03-15 RX ORDER — ROSUVASTATIN CALCIUM 5 MG/1
5 TABLET, COATED ORAL DAILY
Qty: 90 TABLET | Refills: 1 | Status: SHIPPED | OUTPATIENT
Start: 2023-03-15

## 2023-05-01 NOTE — PROGRESS NOTES
Cardiology Follow Up    Kerry Patel  1955  [id number]  [address] [facility] [id number] [phone]    1  Essential (primary) hypertension  Echo complete w/ contrast if indicated      2  LVH (left ventricular hypertrophy)  Echo complete w/ contrast if indicated      3  Aortic valve disease  Echo complete w/ contrast if indicated          Interval History: Patient is here for a follow-up visit  Patient has AVD   Lipid profile done 2023 demonstrated total cholesterol of 180 with an HDL of 74 and a calculated LDL of 80  Echo done 2023 demonstrated LVEF of 60% with mild LVH  There was mild mixed aortic valve disease with a mean gradient of 11 mmHg across the AV  Ascending aorta was mildly dilated  Patient has been well  She has had no chest pain or significant dyspnea  Patient Active Problem List   Diagnosis    Nonrheumatic aortic (valve) insufficiency    Sleep apnea    Vitamin B12 deficiency    Migraine with aura and without status migrainosus, not intractable    Aortic dilatation (HCC)    Essential hypertension    Hyperlipidemia    Obesity (BMI 30 0-34  9)    Obesity, morbid ([address] )    Vitamin D deficiency     Past Medical History:   Diagnosis Date    Allergic rhinitis     Aortic regurgitation     HTN (hypertension)     Hyperlipidemia     Nonrheumatic aortic (valve) insufficiency     Sleep apnea     Vitamin B12 deficiency      Social History     Socioeconomic History    Marital status: /Civil Union     Spouse name: Not on file    Number of children: 2    Years of education: Not on file    Highest education level: Not on file   Occupational History    Not on file   Tobacco Use    Smoking status: Former     Packs/day: 1 00     Types: Cigarettes     Start date: 1968     Quit date: 2005     Years since quittin 3    Smokeless tobacco: Never   Vaping Use  Vaping Use: Never used   Substance and Sexual Activity    Alcohol use: Yes     Alcohol/week: 5 0 standard drinks     Types: 5 Standard drinks or equivalent per week    Drug use: Never    Sexual activity: Not Currently   Other Topics Concern    Not on file   Social History Narrative    2 daughters    Travel outside [place]: No      Social Determinants of Health     Financial Resource Strain: Low Risk     Difficulty of Paying Living Expenses: Not hard at all   Food Insecurity: Not on file   Transportation Needs: No Transportation Needs    Lack of Transportation (Medical): No    Lack of Transportation (Non-Medical):  No   Physical Activity: Not on file   Stress: Not on file   Social Connections: Not on file   Intimate Partner Violence: Not on file   Housing Stability: Not on file      Family History   Problem Relation Age of Onset    Coronary artery disease Mother     Kidney cancer Father     No Known Problems Sister     No Known Problems Sister     No Known Problems Sister      Past Surgical History:   Procedure Laterality Date    BUNIONECTOMY      CHOLECYSTECTOMY      COLONOSCOPY  07/02/2018    DILATION AND CURETTAGE OF UTERUS      MAMMO (HISTORICAL)  03/21/2017 5/18/2018 and 3/21/2017     NM LAPAROSCOPIC APPENDECTOMY N/A 8/2/2021    Procedure: APPENDECTOMY LAPAROSCOPIC;  Surgeon: Keely Franklin MD;  Location: [facility];  Service: Colorectal    WISDOM TOOTH EXTRACTION         Current Outpatient Medications:     acetaminophen (TYLENOL) 650 mg CR tablet, Take 1 tablet (650 mg total) by mouth every 8 (eight) hours as needed for mild pain, Disp: 30 tablet, Rfl: 0    Calcium 600 MG tablet, Take 2 tablets by mouth daily, Disp: , Rfl:     calcium citrate-vitamin D (CITRACAL+D) 315-200 MG-UNIT per tablet, Take 1 tablet by mouth 2 (two) times a day, Disp: , Rfl:     chlorhexidine (PERIDEX) 0 12 % solution, RINSE BY MOUTH TWICE A DAY, Disp: , Rfl:     cholecalciferol (VITAMIN D3) 1,000 units "tablet, Take 1,000 Units by mouth daily 5,000 units daily twice a day, Disp: , Rfl:     cyanocobalamin (VITAMIN B-12) 500 mcg tablet, Take 1 tablet by mouth daily, Disp: , Rfl:     labetalol (NORMODYNE) 100 mg tablet, Take 1 tablet (100 mg total) by mouth 2 (two) times a day, Disp: 180 tablet, Rfl: 1    Multiple Vitamins-Minerals (MULTIVITAMIN ADULT) TABS, Take 1 tablet by mouth daily, Disp: , Rfl:     protriptyline (VIVACTIL) 5 MG tablet, Take 1 tablet (5 mg total) by mouth daily at bedtime, Disp: 90 tablet, Rfl: 1    rosuvastatin (CRESTOR) 5 mg tablet, Take 1 tablet (5 mg total) by mouth daily, Disp: 90 tablet, Rfl: 1    trandolapril (MAVIK) 4 MG tablet, Take 1 tablet (4 mg total) by mouth daily, Disp: 90 tablet, Rfl: 1    verapamil (CALAN) 80 mg tablet, Take 1 tablet (80 mg total) by mouth 3 (three) times a day, Disp: 270 tablet, Rfl: 1  Allergies   Allergen Reactions    Penicillins Rash     Other reaction(s): PENICILLIN G POTASSIUM (PENICILLIN)         Labs:not applicable  Imaging: No results found  Review of Systems:  Review of Systems   All other systems reviewed and are negative  Physical Exam:  /90 (BP Location: Left arm, Patient Position: Sitting, Cuff Size: Large)   Pulse 78   Ht 5' 5\" (1 651 m) Comment: verbal  Wt 104 kg (229 lb 6 4 oz)   SpO2 100%   BMI 38 17 kg/m²   Physical Exam  Vitals reviewed  Constitutional:       Appearance: She is well-developed  HENT:      Head: Normocephalic and atraumatic  Eyes:      Conjunctiva/sclera: Conjunctivae normal       Pupils: Pupils are equal, round, and reactive to light  Cardiovascular:      Rate and Rhythm: Normal rate  Heart sounds: Murmur heard  Pulmonary:      Effort: Pulmonary effort is normal       Breath sounds: Normal breath sounds  Musculoskeletal:      Cervical back: Normal range of motion and neck supple  Skin:     General: Skin is warm and dry     Neurological:      Mental Status: She is alert and oriented " to person, place, and time  Discussion/Summary:I will continue the patient's present medical regimen  The patient appears well compensated  I have asked the patient to call if there is a problem in the interim otherwise I will see the patient in one years time  Patient is due for an echocardiogram prior to the next visit to assess LV wall thickness and systolic function

## 2023-05-02 ENCOUNTER — OFFICE VISIT (OUTPATIENT)
Dept: CARDIOLOGY CLINIC | Facility: CLINIC | Age: 68
End: 2023-05-02

## 2023-05-02 VITALS
SYSTOLIC BLOOD PRESSURE: 152 MMHG | BODY MASS INDEX: 38.22 KG/M2 | WEIGHT: 229.4 LBS | OXYGEN SATURATION: 100 % | DIASTOLIC BLOOD PRESSURE: 90 MMHG | HEART RATE: 78 BPM | HEIGHT: 65 IN

## 2023-05-02 DIAGNOSIS — I10 ESSENTIAL (PRIMARY) HYPERTENSION: Primary | ICD-10-CM

## 2023-05-02 DIAGNOSIS — I35.9 AORTIC VALVE DISEASE: ICD-10-CM

## 2023-05-02 DIAGNOSIS — I51.7 LVH (LEFT VENTRICULAR HYPERTROPHY): ICD-10-CM

## 2023-05-23 DIAGNOSIS — G43.109 MIGRAINE WITH AURA AND WITHOUT STATUS MIGRAINOSUS, NOT INTRACTABLE: ICD-10-CM

## 2023-05-23 RX ORDER — PROTRIPTYLINE HYDROCHLORIDE 5 MG/1
5 TABLET, FILM COATED ORAL
Qty: 90 TABLET | Refills: 0 | Status: SHIPPED | OUTPATIENT
Start: 2023-05-23

## 2023-06-15 DIAGNOSIS — I10 ESSENTIAL HYPERTENSION: ICD-10-CM

## 2023-06-16 RX ORDER — LABETALOL 100 MG/1
100 TABLET, FILM COATED ORAL 2 TIMES DAILY
Qty: 180 TABLET | Refills: 0 | Status: SHIPPED | OUTPATIENT
Start: 2023-06-16

## 2023-07-26 DIAGNOSIS — I10 ESSENTIAL HYPERTENSION: ICD-10-CM

## 2023-07-26 RX ORDER — TRANDOLAPRIL TABLETS 4 MG/1
4 TABLET ORAL DAILY
Qty: 90 TABLET | Refills: 0 | Status: SHIPPED | OUTPATIENT
Start: 2023-07-26

## 2023-08-01 ENCOUNTER — HOSPITAL ENCOUNTER (OUTPATIENT)
Dept: RADIOLOGY | Age: 68
Discharge: HOME | End: 2023-08-01
Payer: COMMERCIAL

## 2023-08-01 DIAGNOSIS — R91.8 MASS OF UPPER LOBE OF LEFT LUNG: ICD-10-CM

## 2023-08-01 PROCEDURE — 71250 CT THORAX DX C-: CPT

## 2023-08-01 PROCEDURE — G1004 CDSM NDSC: HCPCS

## 2023-08-09 ENCOUNTER — OFFICE VISIT (OUTPATIENT)
Dept: INTERNAL MEDICINE CLINIC | Facility: CLINIC | Age: 68
End: 2023-08-09
Payer: COMMERCIAL

## 2023-08-09 VITALS
WEIGHT: 208 LBS | TEMPERATURE: 97.7 F | HEART RATE: 71 BPM | BODY MASS INDEX: 34.66 KG/M2 | HEIGHT: 65 IN | OXYGEN SATURATION: 99 % | SYSTOLIC BLOOD PRESSURE: 130 MMHG | DIASTOLIC BLOOD PRESSURE: 80 MMHG

## 2023-08-09 DIAGNOSIS — I77.819 AORTIC DILATATION (HCC): ICD-10-CM

## 2023-08-09 DIAGNOSIS — I10 ESSENTIAL HYPERTENSION: Primary | ICD-10-CM

## 2023-08-09 DIAGNOSIS — E78.2 MIXED HYPERLIPIDEMIA: ICD-10-CM

## 2023-08-09 DIAGNOSIS — E66.9 OBESITY (BMI 30.0-34.9): ICD-10-CM

## 2023-08-09 DIAGNOSIS — G43.109 MIGRAINE WITH AURA AND WITHOUT STATUS MIGRAINOSUS, NOT INTRACTABLE: ICD-10-CM

## 2023-08-09 PROCEDURE — 99214 OFFICE O/P EST MOD 30 MIN: CPT | Performed by: INTERNAL MEDICINE

## 2023-08-09 NOTE — PROGRESS NOTES
Assessment/Plan:           1. Essential hypertension  Comments:  Continue labetalol trandolapril and verapamil. Orders:  -     CBC and differential; Future  -     Comprehensive metabolic panel; Future  -     Urinalysis with microscopic  -     TSH, 3rd generation; Future    2. Migraine with aura and without status migrainosus, not intractable  Comments:  Stable continue protriptyline. 3. Mixed hyperlipidemia  Comments:  Continue rosuvastatin. Orders:  -     Lipid panel; Future    4. Aortic dilatation (HCC)    5. Obesity (BMI 30.0-34. 9)  Comments:  Successfully lost significant amount of weight with intermittent fasting and calorie control. 1. Essential hypertension      2. Migraine with aura and without status migrainosus, not intractable         No problem-specific Assessment & Plan notes found for this encounter. Subjective:      Patient ID: Lakshmi Foy is a 75 y.o. female. HPI    The following portions of the patient's history were reviewed and updated as appropriate: She  has a past medical history of Allergic rhinitis, Aortic regurgitation, HTN (hypertension), Hyperlipidemia, Nonrheumatic aortic (valve) insufficiency, Sleep apnea, and Vitamin B12 deficiency. She   Patient Active Problem List    Diagnosis Date Noted   • Obesity, morbid ([address]) 12/20/2021   • Vitamin D deficiency 12/20/2021   • Essential hypertension    • Hyperlipidemia    • Obesity (BMI 30.0-34. 9)    • Migraine with aura and without status migrainosus, not intractable 04/07/2021   • Aortic dilatation ([address]) 04/07/2021   • Nonrheumatic aortic (valve) insufficiency    • Sleep apnea    • Vitamin B12 deficiency      She  has a past surgical history that includes Dilation and curettage of uterus; [place] tooth extraction; Bunionectomy; Cholecystectomy; Colonoscopy (07/02/2018); Mammo (historical) (03/21/2017); and pr laparoscopic appendectomy (N/A, 8/2/2021).   Her family history includes Coronary artery disease in her mother; Kidney cancer in her father; No Known Problems in her sister, sister, and sister. She  reports that she quit smoking about 18 years ago. Her smoking use included cigarettes. She started smoking about 55 years ago. She smoked an average of 1 pack per day. She has never used smokeless tobacco. She reports current alcohol use of about 5.0 standard drinks of alcohol per week. She reports that she does not use drugs. Current Outpatient Medications   Medication Sig Dispense Refill   • acetaminophen (TYLENOL) 650 mg CR tablet Take 1 tablet (650 mg total) by mouth every 8 (eight) hours as needed for mild pain 30 tablet 0   • Calcium 600 MG tablet Take 2 tablets by mouth daily     • calcium citrate-vitamin D (CITRACAL+D) 315-200 MG-UNIT per tablet Take 1 tablet by mouth 2 (two) times a day     • cholecalciferol (VITAMIN D3) 1,000 units tablet Take 1,000 Units by mouth daily 5,000 units daily twice a day     • cyanocobalamin (VITAMIN B-12) 500 mcg tablet Take 1 tablet by mouth daily     • labetalol (NORMODYNE) 100 mg tablet Take 1 tablet (100 mg total) by mouth 2 (two) times a day 180 tablet 0   • Multiple Vitamins-Minerals (MULTIVITAMIN ADULT) TABS Take 1 tablet by mouth daily     • protriptyline (VIVACTIL) 5 MG tablet Take 1 tablet (5 mg total) by mouth daily at bedtime 90 tablet 0   • rosuvastatin (CRESTOR) 5 mg tablet Take 1 tablet (5 mg total) by mouth daily 90 tablet 1   • trandolapril (MAVIK) 4 MG tablet Take 1 tablet (4 mg total) by mouth daily 90 tablet 0   • verapamil (CALAN) 80 mg tablet Take 1 tablet (80 mg total) by mouth 3 (three) times a day 270 tablet 1   • chlorhexidine (PERIDEX) 0.12 % solution RINSE BY MOUTH TWICE A DAY (Patient not taking: Reported on 8/9/2023)       No current facility-administered medications for this visit.      Current Outpatient Medications on File Prior to Visit   Medication Sig   • acetaminophen (TYLENOL) 650 mg CR tablet Take 1 tablet (650 mg total) by mouth every 8 (eight) hours as needed for mild pain   • Calcium 600 MG tablet Take 2 tablets by mouth daily   • calcium citrate-vitamin D (CITRACAL+D) 315-200 MG-UNIT per tablet Take 1 tablet by mouth 2 (two) times a day   • cholecalciferol (VITAMIN D3) 1,000 units tablet Take 1,000 Units by mouth daily 5,000 units daily twice a day   • cyanocobalamin (VITAMIN B-12) 500 mcg tablet Take 1 tablet by mouth daily   • labetalol (NORMODYNE) 100 mg tablet Take 1 tablet (100 mg total) by mouth 2 (two) times a day   • Multiple Vitamins-Minerals (MULTIVITAMIN ADULT) TABS Take 1 tablet by mouth daily   • protriptyline (VIVACTIL) 5 MG tablet Take 1 tablet (5 mg total) by mouth daily at bedtime   • rosuvastatin (CRESTOR) 5 mg tablet Take 1 tablet (5 mg total) by mouth daily   • trandolapril (MAVIK) 4 MG tablet Take 1 tablet (4 mg total) by mouth daily   • verapamil (CALAN) 80 mg tablet Take 1 tablet (80 mg total) by mouth 3 (three) times a day   • chlorhexidine (PERIDEX) 0.12 % solution RINSE BY MOUTH TWICE A DAY (Patient not taking: Reported on 8/9/2023)     No current facility-administered medications on file prior to visit. There are no discontinued medications. She is allergic to penicillins. .    Review of Systems   Constitutional: Negative for appetite change, chills, fatigue and fever. HENT: Negative for sore throat and trouble swallowing. Eyes: Negative for redness. Respiratory: Negative for shortness of breath. Cardiovascular: Negative for chest pain and palpitations. Gastrointestinal: Negative for abdominal pain, constipation and diarrhea. Genitourinary: Negative for dysuria and hematuria. Musculoskeletal: Negative for back pain and neck pain. Skin: Negative for rash. Neurological: Negative for seizures, weakness and headaches. Hematological: Negative for adenopathy. Psychiatric/Behavioral: Negative for confusion. The patient is not nervous/anxious.           Objective:      /80 (BP Location: Left arm, Patient Position: Sitting, Cuff Size: Large)   Pulse 71   Temp 97.7 °F (36.5 °C) (Temporal)   Ht 5' 5" (1.651 m)   Wt 94.3 kg (208 lb)   SpO2 99%   BMI 34.61 kg/m²     Results Reviewed     None          No results found for this or any previous visit (from the past 1344 hour(s)). Physical Exam  Constitutional:       General: She is not in acute distress. Appearance: Normal appearance. HENT:      Head: Normocephalic and atraumatic. Right Ear: Tympanic membrane normal.      Left Ear: Tympanic membrane normal.      Nose: Nose normal.      Mouth/Throat:      Mouth: Mucous membranes are moist.   Eyes:      Extraocular Movements: Extraocular movements intact. Pupils: Pupils are equal, round, and reactive to light. Cardiovascular:      Rate and Rhythm: Normal rate and regular rhythm. Pulses: Normal pulses. Heart sounds: Normal heart sounds. No murmur heard. No friction rub. Pulmonary:      Effort: Pulmonary effort is normal. No respiratory distress. Breath sounds: Normal breath sounds. No wheezing. Abdominal:      General: Abdomen is flat. Bowel sounds are normal. There is no distension. Palpations: Abdomen is soft. There is no mass. Tenderness: There is no abdominal tenderness. There is no guarding. Musculoskeletal:         General: Normal range of motion. Cervical back: Normal range of motion. Skin:     General: Skin is warm. Neurological:      General: No focal deficit present. Mental Status: She is alert and oriented to person, place, and time. Mental status is at baseline. Cranial Nerves: No cranial nerve deficit.    Psychiatric:         Mood and Affect: Mood normal.         Behavior: Behavior normal.

## 2023-08-16 ENCOUNTER — OFFICE VISIT (OUTPATIENT)
Dept: CARDIAC SURGERY | Facility: CLINIC | Age: 68
End: 2023-08-16
Payer: COMMERCIAL

## 2023-08-16 VITALS
WEIGHT: 207.01 LBS | HEIGHT: 65 IN | RESPIRATION RATE: 15 BRPM | DIASTOLIC BLOOD PRESSURE: 80 MMHG | TEMPERATURE: 98.5 F | SYSTOLIC BLOOD PRESSURE: 138 MMHG | HEART RATE: 81 BPM | BODY MASS INDEX: 34.49 KG/M2 | OXYGEN SATURATION: 98 %

## 2023-08-16 DIAGNOSIS — Z87.898 HISTORY OF SOLITARY PULMONARY NODULE: Primary | ICD-10-CM

## 2023-08-16 PROCEDURE — 99213 OFFICE O/P EST LOW 20 MIN: CPT | Performed by: THORACIC SURGERY (CARDIOTHORACIC VASCULAR SURGERY)

## 2023-08-16 NOTE — ASSESSMENT & PLAN NOTE
Penny Rodriguez is stable from a thoracic surgery standpoint. Her most recent CT scan does not reveal any new or growing nodules. She does not need to follow up with us anymore. Since she quit smoking over 15 years ago, she does not qualify for yearly lung cancer screening CT's. Therefore, she will only return to our office on as needed basis. She is in agreement with the plan.

## 2023-08-16 NOTE — PROGRESS NOTES
Assessment/Plan:    History of solitary pulmonary nodule  Jayce Irvin is stable from a thoracic surgery standpoint. Her most recent CT scan does not reveal any new or growing nodules. She does not need to follow up with us anymore. Since she quit smoking over 15 years ago, she does not qualify for yearly lung cancer screening CT's. Therefore, she will only return to our office on as needed basis. She is in agreement with the plan. There are no diagnoses linked to this encounter. Thoracic History   Cancer Staging   No matching staging information was found for the patient. Oncology History    No history exists. Patient ID: Aubree Cho is a 75 y.o. female. ECOG 0  HPI     Jayce Irvin is a 74 yo female who we saw for a left upper lobe lung nodule on 8/17/22, which had resolved by that point. We are seeing her back with a new CT scan, since she has a 20 ppy smoking history. CT from 8/1/23 revealed a 2 mm calcified RUL granuloma. No recurrence of the previously seen left upper lobe nodules. On discussion, she quit smoking in 2006. She is feeling pretty well. She denies fever, chills, shortness of breath, weight loss, or cough. The following portions of the patient's history were reviewed and updated as appropriate: allergies, current medications, past family history, past medical history, past social history, past surgical history and problem list.    Review of Systems      Objective:   Physical Exam  Vitals reviewed. Constitutional:       General: She is not in acute distress. Appearance: Normal appearance. HENT:      Head: Normocephalic and atraumatic. Nose: Nose normal.   Eyes:      General: No scleral icterus. Extraocular Movements: Extraocular movements intact. Cardiovascular:      Rate and Rhythm: Normal rate and regular rhythm. Heart sounds: Normal heart sounds. No murmur heard. Pulmonary:      Effort: Pulmonary effort is normal. No respiratory distress.       Breath sounds: Normal breath sounds. Musculoskeletal:      Cervical back: Normal range of motion and neck supple. Right lower leg: No edema. Left lower leg: No edema. Skin:     General: Skin is warm and dry. Neurological:      Mental Status: She is alert and oriented to person, place, and time. Cranial Nerves: No cranial nerve deficit. Psychiatric:         Mood and Affect: Mood normal.         Behavior: Behavior normal.         Thought Content: Thought content normal.     /80 (BP Location: Left arm, Patient Position: Sitting, Cuff Size: Large)   Pulse 81   Temp 98.5 °F (36.9 °C) (Temporal)   Resp 15   Ht 5' 5" (1.651 m)   Wt 93.9 kg (207 lb 0.2 oz)   SpO2 98%   BMI 34.45 kg/m²       CT chest wo contrast    Result Date: 8/7/2023  Narrative CT CHEST WITHOUT IV CONTRAST INDICATION:   R91.8: Other nonspecific abnormal finding of lung field. COMPARISON:  None. TECHNIQUE: CT examination of the chest was performed without intravenous contrast. Multiplanar 2D reformatted images were created from the source data. This examination, like all CT scans performed in the [facility], was performed utilizing techniques to minimize radiation dose exposure, including the use of iterative reconstruction and automated exposure control. Radiation dose length product (DLP) for this visit:  327 mGy-cm FINDINGS: LUNGS: Stable 2 mm calcified right upper lobe granuloma (series 2 image 48). The previous left upper lobe nodules were resolved on the most recent prior and have not recurred. There is no tracheal or endobronchial lesion. PLEURA:  Unremarkable. HEART/GREAT VESSELS: Heart is unremarkable for patient's age. There is fusiform ectasia of the ascending thoracic aorta measuring up to 41 mm. Recommendation is for follow-up low radiation dose chest CT in one year. MEDIASTINUM AND NICOL:  Unremarkable. CHEST WALL AND LOWER NECK:  Unremarkable.  VISUALIZED STRUCTURES IN THE UPPER ABDOMEN: Status post cholecystectomy. OSSEOUS STRUCTURES:  No acute fracture or destructive osseous lesion. Impression No recurrence of the left upper lobe pulmonary nodules. Patient should return to annual lung cancer screening program. Please order CT lung screening program. Fusiform ectasia of the ascending thoracic aorta for which low radiation dose chest CT in 1 year is recommended. This can be assessed on the annual lung screening CT.  Workstation performed: [id number]

## 2023-08-19 DIAGNOSIS — I10 ESSENTIAL HYPERTENSION: ICD-10-CM

## 2023-08-21 DIAGNOSIS — I10 ESSENTIAL HYPERTENSION: ICD-10-CM

## 2023-08-21 RX ORDER — VERAPAMIL HYDROCHLORIDE 80 MG/1
80 TABLET ORAL 3 TIMES DAILY
Qty: 270 TABLET | Refills: 1 | OUTPATIENT
Start: 2023-08-21

## 2023-08-22 RX ORDER — VERAPAMIL HYDROCHLORIDE 80 MG/1
80 TABLET ORAL 3 TIMES DAILY
Qty: 270 TABLET | Refills: 1 | Status: SHIPPED | OUTPATIENT
Start: 2023-08-22 | End: 2023-11-20

## 2023-09-13 DIAGNOSIS — E78.2 MIXED HYPERLIPIDEMIA: ICD-10-CM

## 2023-09-13 RX ORDER — ROSUVASTATIN CALCIUM 5 MG/1
5 TABLET, COATED ORAL DAILY
Qty: 90 TABLET | Refills: 1 | OUTPATIENT
Start: 2023-09-13

## 2023-09-14 DIAGNOSIS — I10 ESSENTIAL HYPERTENSION: ICD-10-CM

## 2023-09-14 DIAGNOSIS — E78.2 MIXED HYPERLIPIDEMIA: ICD-10-CM

## 2023-09-14 DIAGNOSIS — G43.109 MIGRAINE WITH AURA AND WITHOUT STATUS MIGRAINOSUS, NOT INTRACTABLE: ICD-10-CM

## 2023-09-15 RX ORDER — LABETALOL 100 MG/1
100 TABLET, FILM COATED ORAL 2 TIMES DAILY
Qty: 180 TABLET | Refills: 1 | Status: SHIPPED | OUTPATIENT
Start: 2023-09-15

## 2023-09-15 RX ORDER — ROSUVASTATIN CALCIUM 5 MG/1
5 TABLET, COATED ORAL DAILY
Qty: 90 TABLET | Refills: 1 | Status: SHIPPED | OUTPATIENT
Start: 2023-09-15

## 2023-09-15 RX ORDER — LABETALOL 100 MG/1
100 TABLET, FILM COATED ORAL 2 TIMES DAILY
Qty: 180 TABLET | Refills: 0 | Status: SHIPPED | OUTPATIENT
Start: 2023-09-15 | End: 2023-09-15 | Stop reason: SDUPTHER

## 2023-09-15 RX ORDER — PROTRIPTYLINE HYDROCHLORIDE 5 MG/1
5 TABLET, FILM COATED ORAL
Qty: 90 TABLET | Refills: 0 | Status: SHIPPED | OUTPATIENT
Start: 2023-09-15 | End: 2023-09-15 | Stop reason: SDUPTHER

## 2023-09-15 RX ORDER — PROTRIPTYLINE HYDROCHLORIDE 5 MG/1
5 TABLET, FILM COATED ORAL
Qty: 90 TABLET | Refills: 1 | Status: SHIPPED | OUTPATIENT
Start: 2023-09-15

## 2023-10-16 DIAGNOSIS — I10 ESSENTIAL HYPERTENSION: ICD-10-CM

## 2023-10-16 RX ORDER — TRANDOLAPRIL TABLETS 4 MG/1
4 TABLET ORAL DAILY
Qty: 90 TABLET | Refills: 1 | Status: SHIPPED | OUTPATIENT
Start: 2023-10-16

## 2023-10-20 LAB
ALBUMIN SERPL-MCNC: 4.2 G/DL (ref 3.6–5.1)
ALBUMIN/GLOB SERPL: 2.1 (CALC) (ref 1–2.5)
ALP SERPL-CCNC: 64 U/L (ref 37–153)
ALT SERPL-CCNC: 9 U/L (ref 6–29)
APPEARANCE UR: CLEAR
AST SERPL-CCNC: 12 U/L (ref 10–35)
BACTERIA UR QL AUTO: ABNORMAL /HPF
BASOPHILS # BLD AUTO: 32 CELLS/UL (ref 0–200)
BASOPHILS NFR BLD AUTO: 0.6 %
BILIRUB SERPL-MCNC: 0.5 MG/DL (ref 0.2–1.2)
BILIRUB UR QL STRIP: NEGATIVE
BUN SERPL-MCNC: 20 MG/DL (ref 7–25)
BUN/CREAT SERPL: NORMAL (CALC) (ref 6–22)
CALCIUM SERPL-MCNC: 9.2 MG/DL (ref 8.6–10.4)
CHLORIDE SERPL-SCNC: 105 MMOL/L (ref 98–110)
CHOLEST SERPL-MCNC: 193 MG/DL
CHOLEST/HDLC SERPL: 2.5 (CALC)
CO2 SERPL-SCNC: 27 MMOL/L (ref 20–32)
COLOR UR: YELLOW
CREAT SERPL-MCNC: 0.96 MG/DL (ref 0.5–1.05)
EOSINOPHIL # BLD AUTO: 81 CELLS/UL (ref 15–500)
EOSINOPHIL NFR BLD AUTO: 1.5 %
ERYTHROCYTE [DISTWIDTH] IN BLOOD BY AUTOMATED COUNT: 14.2 % (ref 11–15)
GFR/BSA.PRED SERPLBLD CYS-BASED-ARV: 64 ML/MIN/1.73M2
GLOBULIN SER CALC-MCNC: 2 G/DL (CALC) (ref 1.9–3.7)
GLUCOSE SERPL-MCNC: 97 MG/DL (ref 65–99)
GLUCOSE UR QL STRIP: NEGATIVE
HCT VFR BLD AUTO: 40.8 % (ref 35–45)
HDLC SERPL-MCNC: 76 MG/DL
HGB BLD-MCNC: 13.3 G/DL (ref 11.7–15.5)
HGB UR QL STRIP: NEGATIVE
HYALINE CASTS #/AREA URNS LPF: ABNORMAL /LPF
KETONES UR QL STRIP: NEGATIVE
LDLC SERPL CALC-MCNC: 95 MG/DL (CALC)
LEUKOCYTE ESTERASE UR QL STRIP: ABNORMAL
LYMPHOCYTES # BLD AUTO: 1885 CELLS/UL (ref 850–3900)
LYMPHOCYTES NFR BLD AUTO: 34.9 %
MCH RBC QN AUTO: 29.7 PG (ref 27–33)
MCHC RBC AUTO-ENTMCNC: 32.6 G/DL (ref 32–36)
MCV RBC AUTO: 91.1 FL (ref 80–100)
MONOCYTES # BLD AUTO: 340 CELLS/UL (ref 200–950)
MONOCYTES NFR BLD AUTO: 6.3 %
NEUTROPHILS # BLD AUTO: 3062 CELLS/UL (ref 1500–7800)
NEUTROPHILS NFR BLD AUTO: 56.7 %
NITRITE UR QL STRIP: NEGATIVE
NONHDLC SERPL-MCNC: 117 MG/DL (CALC)
PH UR STRIP: 5.5 [PH] (ref 5–8)
PLATELET # BLD AUTO: 193 THOUSAND/UL (ref 140–400)
PMV BLD REES-ECKER: 12.1 FL (ref 7.5–12.5)
POTASSIUM SERPL-SCNC: 4.3 MMOL/L (ref 3.5–5.3)
PROT SERPL-MCNC: 6.2 G/DL (ref 6.1–8.1)
PROT UR QL STRIP: NEGATIVE
RBC # BLD AUTO: 4.48 MILLION/UL (ref 3.8–5.1)
RBC #/AREA URNS HPF: ABNORMAL /HPF
SODIUM SERPL-SCNC: 139 MMOL/L (ref 135–146)
SP GR UR STRIP: 1 (ref 1–1.03)
SQUAMOUS #/AREA URNS HPF: ABNORMAL /HPF
TRIGL SERPL-MCNC: 119 MG/DL
TSH SERPL-ACNC: 1.3 MIU/L (ref 0.4–4.5)
WBC # BLD AUTO: 5.4 THOUSAND/UL (ref 3.8–10.8)
WBC #/AREA URNS HPF: ABNORMAL /HPF

## 2023-12-11 ENCOUNTER — RA CDI HCC (OUTPATIENT)
Dept: OTHER | Facility: HOSPITAL | Age: 68
End: 2023-12-11

## 2023-12-11 NOTE — PROGRESS NOTES
[address] [facility] coding opportunities       Chart reviewed, no opportunity found: CHART REVIEWED, NO OPPORTUNITY FOUND        Patients Insurance     [facility]ic[facility]

## 2023-12-18 ENCOUNTER — OFFICE VISIT (OUTPATIENT)
Dept: INTERNAL MEDICINE CLINIC | Facility: CLINIC | Age: 68
End: 2023-12-18
Payer: COMMERCIAL

## 2023-12-18 VITALS
HEART RATE: 74 BPM | WEIGHT: 205 LBS | SYSTOLIC BLOOD PRESSURE: 136 MMHG | HEIGHT: 65 IN | TEMPERATURE: 97.8 F | OXYGEN SATURATION: 97 % | BODY MASS INDEX: 34.16 KG/M2 | DIASTOLIC BLOOD PRESSURE: 80 MMHG

## 2023-12-18 DIAGNOSIS — I10 ESSENTIAL HYPERTENSION: Primary | ICD-10-CM

## 2023-12-18 DIAGNOSIS — G47.33 OBSTRUCTIVE SLEEP APNEA SYNDROME: ICD-10-CM

## 2023-12-18 DIAGNOSIS — Z12.31 VISIT FOR SCREENING MAMMOGRAM: ICD-10-CM

## 2023-12-18 DIAGNOSIS — I35.1 NONRHEUMATIC AORTIC (VALVE) INSUFFICIENCY: ICD-10-CM

## 2023-12-18 DIAGNOSIS — G43.109 MIGRAINE WITH AURA AND WITHOUT STATUS MIGRAINOSUS, NOT INTRACTABLE: ICD-10-CM

## 2023-12-18 DIAGNOSIS — E78.2 MIXED HYPERLIPIDEMIA: ICD-10-CM

## 2023-12-18 PROCEDURE — 99214 OFFICE O/P EST MOD 30 MIN: CPT | Performed by: INTERNAL MEDICINE

## 2023-12-18 NOTE — PROGRESS NOTES
Assessment/Plan:      Depression Screening and Follow-up Plan: Patient was screened for depression during today's encounter. They screened negative with a PHQ-2 score of 0.          1. Essential hypertension  Comments:  Stable continue current regimen labetalol 100 mg every 12 hours trandolapril 4 mg daily verapamil 80 mg 3 times daily.    2. Visit for screening mammogram  -     Mammo screening bilateral w 3d & cad; Future; Expected date: 12/18/2023    3. Mixed hyperlipidemia  Comments:  Continue rosuvastatin 5 mg daily.  Orders:  -     CBC and differential; Future  -     Comprehensive metabolic panel; Future  -     Urinalysis with microscopic  -     Lipid panel; Future    4. Migraine with aura and without status migrainosus, not intractable    5. Nonrheumatic aortic (valve) insufficiency  Comments:  Following with cardiology echocardiogram report reviewed.    6. Obstructive sleep apnea syndrome  Comments:  Stable continue to use CPAP.           1. Visit for screening mammogram    - Mammo screening bilateral w 3d & cad; Future       No problem-specific Assessment & Plan notes found for this encounter.           Subjective:      Patient ID: Amy Can is a 68 y.o. female.    HPI    The following portions of the patient's history were reviewed and updated as appropriate: She  has a past medical history of Allergic rhinitis, Aortic regurgitation, Chronic kidney disease (2022), Heart murmur (2006), HTN (hypertension), Hyperlipidemia, Hypertension (2006), Nonrheumatic aortic (valve) insufficiency, Sleep apnea, and Vitamin B12 deficiency.  She   Patient Active Problem List    Diagnosis Date Noted    History of solitary pulmonary nodule 08/16/2023    Obesity, morbid (HCC) 12/20/2021    Vitamin D deficiency 12/20/2021    Essential hypertension     Hyperlipidemia     Obesity (BMI 30.0-34.9)     Migraine with aura and without status migrainosus, not intractable 04/07/2021    Aortic dilatation (HCC) 04/07/2021     Nonrheumatic aortic (valve) insufficiency     Sleep apnea     Vitamin B12 deficiency      She  has a past surgical history that includes Dilation and curettage of uterus; [place] tooth extraction; Bunionectomy; Cholecystectomy; Colonoscopy (07/02/2018); Mammo (historical) (03/21/2017); and pr laparoscopic appendectomy (N/A, 8/2/2021).  Her family history includes Coronary artery disease in her mother; Kidney cancer in her father; No Known Problems in her sister, sister, and sister.  She  reports that she quit smoking about 18 years ago. Her smoking use included cigarettes. She started smoking about 55 years ago. She has a 36.7 pack-year smoking history. She has never used smokeless tobacco. She reports current alcohol use of about 4.0 standard drinks of alcohol per week. She reports that she does not use drugs.  Current Outpatient Medications   Medication Sig Dispense Refill    acetaminophen (TYLENOL) 650 mg CR tablet Take 1 tablet (650 mg total) by mouth every 8 (eight) hours as needed for mild pain 30 tablet 0    Calcium 600 MG tablet Take 2 tablets by mouth daily      calcium citrate-vitamin D (CITRACAL+D) 315-200 MG-UNIT per tablet Take 1 tablet by mouth 2 (two) times a day      cholecalciferol (VITAMIN D3) 1,000 units tablet Take 1,000 Units by mouth daily 5,000 units daily twice a day      cyanocobalamin (VITAMIN B-12) 500 mcg tablet Take 1 tablet by mouth daily      labetalol (NORMODYNE) 100 mg tablet Take 1 tablet (100 mg total) by mouth 2 (two) times a day 180 tablet 1    Multiple Vitamins-Minerals (MULTIVITAMIN ADULT) TABS Take 1 tablet by mouth daily      protriptyline (VIVACTIL) 5 MG tablet Take 1 tablet (5 mg total) by mouth daily at bedtime 90 tablet 1    rosuvastatin (CRESTOR) 5 mg tablet Take 1 tablet (5 mg total) by mouth daily 90 tablet 1    trandolapril (MAVIK) 4 MG tablet Take 1 tablet (4 mg total) by mouth daily 90 tablet 1    verapamil (CALAN) 80 mg tablet Take 1 tablet (80 mg total) by mouth 3  (three) times a day 270 tablet 1    chlorhexidine (PERIDEX) 0.12 % solution RINSE BY MOUTH TWICE A DAY (Patient not taking: Reported on 8/9/2023)       No current facility-administered medications for this visit.     Current Outpatient Medications on File Prior to Visit   Medication Sig    acetaminophen (TYLENOL) 650 mg CR tablet Take 1 tablet (650 mg total) by mouth every 8 (eight) hours as needed for mild pain    Calcium 600 MG tablet Take 2 tablets by mouth daily    calcium citrate-vitamin D (CITRACAL+D) 315-200 MG-UNIT per tablet Take 1 tablet by mouth 2 (two) times a day    cholecalciferol (VITAMIN D3) 1,000 units tablet Take 1,000 Units by mouth daily 5,000 units daily twice a day    cyanocobalamin (VITAMIN B-12) 500 mcg tablet Take 1 tablet by mouth daily    labetalol (NORMODYNE) 100 mg tablet Take 1 tablet (100 mg total) by mouth 2 (two) times a day    Multiple Vitamins-Minerals (MULTIVITAMIN ADULT) TABS Take 1 tablet by mouth daily    protriptyline (VIVACTIL) 5 MG tablet Take 1 tablet (5 mg total) by mouth daily at bedtime    rosuvastatin (CRESTOR) 5 mg tablet Take 1 tablet (5 mg total) by mouth daily    trandolapril (MAVIK) 4 MG tablet Take 1 tablet (4 mg total) by mouth daily    verapamil (CALAN) 80 mg tablet Take 1 tablet (80 mg total) by mouth 3 (three) times a day    chlorhexidine (PERIDEX) 0.12 % solution RINSE BY MOUTH TWICE A DAY (Patient not taking: Reported on 8/9/2023)     No current facility-administered medications on file prior to visit.     There are no discontinued medications.   She is allergic to penicillins..    Review of Systems   Constitutional:  Negative for appetite change, chills, fatigue and fever.   HENT:  Negative for sore throat and trouble swallowing.    Eyes:  Negative for redness.   Respiratory:  Negative for shortness of breath.    Cardiovascular:  Negative for chest pain and palpitations.   Gastrointestinal:  Negative for abdominal pain, constipation and diarrhea.  "  Genitourinary:  Negative for dysuria and hematuria.   Musculoskeletal:  Negative for back pain and neck pain.   Skin:  Negative for rash.   Neurological:  Negative for seizures, weakness and headaches.   Hematological:  Negative for adenopathy.   Psychiatric/Behavioral:  Negative for confusion. The patient is not nervous/anxious.          Objective:      /80   Pulse 74   Temp 97.8 °F (36.6 °C) (Temporal)   Ht 5' 5\" (1.651 m)   Wt 93 kg (205 lb)   SpO2 97% Comment: RA  BMI 34.11 kg/m²     Results Reviewed       None            No results found for this or any previous visit (from the past 1344 hour(s)).     Physical Exam  Constitutional:       General: She is not in acute distress.     Appearance: Normal appearance.   HENT:      Head: Normocephalic and atraumatic.      Right Ear: Tympanic membrane normal.      Left Ear: Tympanic membrane normal.      Nose: Nose normal.      Mouth/Throat:      Mouth: Mucous membranes are moist.   Eyes:      Extraocular Movements: Extraocular movements intact.      Pupils: Pupils are equal, round, and reactive to light.   Cardiovascular:      Rate and Rhythm: Normal rate and regular rhythm.      Pulses: Normal pulses.      Heart sounds: Murmur heard.      No friction rub.   Pulmonary:      Effort: Pulmonary effort is normal. No respiratory distress.      Breath sounds: Normal breath sounds. No wheezing.   Abdominal:      General: Abdomen is flat. Bowel sounds are normal. There is no distension.      Palpations: Abdomen is soft. There is no mass.      Tenderness: There is no abdominal tenderness. There is no guarding.   Musculoskeletal:         General: Normal range of motion.      Cervical back: Normal range of motion.   Skin:     General: Skin is warm.   Neurological:      General: No focal deficit present.      Mental Status: She is alert and oriented to person, place, and time. Mental status is at baseline.      Cranial Nerves: No cranial nerve deficit.   Psychiatric:  "        Mood and Affect: Mood normal.         Behavior: Behavior normal.

## 2024-02-20 DIAGNOSIS — I10 ESSENTIAL HYPERTENSION: ICD-10-CM

## 2024-02-20 RX ORDER — VERAPAMIL HYDROCHLORIDE 80 MG/1
80 TABLET ORAL 3 TIMES DAILY
Qty: 270 TABLET | Refills: 1 | Status: SHIPPED | OUTPATIENT
Start: 2024-02-20 | End: 2024-05-20

## 2024-03-05 ENCOUNTER — HOSPITAL ENCOUNTER (OUTPATIENT)
Dept: NON INVASIVE DIAGNOSTICS | Facility: CLINIC | Age: 69
Discharge: HOME | End: 2024-03-05
Payer: COMMERCIAL

## 2024-03-05 VITALS
HEIGHT: 65 IN | SYSTOLIC BLOOD PRESSURE: 136 MMHG | DIASTOLIC BLOOD PRESSURE: 80 MMHG | BODY MASS INDEX: 34.16 KG/M2 | WEIGHT: 205 LBS | HEART RATE: 56 BPM

## 2024-03-05 DIAGNOSIS — I51.7 LVH (LEFT VENTRICULAR HYPERTROPHY): ICD-10-CM

## 2024-03-05 DIAGNOSIS — I10 ESSENTIAL (PRIMARY) HYPERTENSION: ICD-10-CM

## 2024-03-05 DIAGNOSIS — I35.9 AORTIC VALVE DISEASE: ICD-10-CM

## 2024-03-05 LAB
AORTIC ROOT: 3.7 CM
AORTIC VALVE MEAN VELOCITY: 18.1 M/S
APICAL FOUR CHAMBER EJECTION FRACTION: 59 %
ASCENDING AORTA: 4.3 CM
AV AREA BY CONTINUOUS VTI: 1.6 CM2
AV AREA PEAK VELOCITY: 1.5 CM2
AV LVOT MEAN GRADIENT: 4 MMHG
AV LVOT PEAK GRADIENT: 7 MMHG
AV MEAN GRADIENT: 15 MMHG
AV PEAK GRADIENT: 25 MMHG
AV REGURGITATION PRESSURE HALF TIME: 459 MS
AV VALVE AREA: 1.55 CM2
AV VELOCITY RATIO: 0.53
BSA FOR ECHO PROCEDURE: 2 M2
DOP CALC AO PEAK VEL: 2.48 M/S
DOP CALC AO VTI: 60.46 CM
DOP CALC LVOT AREA: 2.83 CM2
DOP CALC LVOT CARDIAC INDEX: 2.99 L/MIN/M2
DOP CALC LVOT CARDIAC OUTPUT: 5.98 L/MIN
DOP CALC LVOT DIAMETER: 1.9 CM
DOP CALC LVOT PEAK VEL VTI: 33.15 CM
DOP CALC LVOT PEAK VEL: 1.32 M/S
DOP CALC LVOT STROKE INDEX: 49.5 ML/M2
DOP CALC LVOT STROKE VOLUME: 93.94
E WAVE DECELERATION TIME: 249 MS
E/A RATIO: 0.92
FRACTIONAL SHORTENING: 26 (ref 28–44)
GLOBAL LONGITUIDAL STRAIN: -16 %
INTERVENTRICULAR SEPTUM IN DIASTOLE (PARASTERNAL SHORT AXIS VIEW): 1.3 CM
INTERVENTRICULAR SEPTUM: 1.3 CM (ref 0.6–1.1)
LAAS-AP2: 20 CM2
LAAS-AP4: 19.3 CM2
LEFT ATRIUM SIZE: 3.5 CM
LEFT ATRIUM VOLUME (MOD BIPLANE): 58 ML
LEFT ATRIUM VOLUME INDEX (MOD BIPLANE): 29 ML/M2
LEFT INTERNAL DIMENSION IN SYSTOLE: 4 CM (ref 2.1–4)
LEFT VENTRICULAR INTERNAL DIMENSION IN DIASTOLE: 5.4 CM (ref 3.5–6)
LEFT VENTRICULAR POSTERIOR WALL IN END DIASTOLE: 1.3 CM
LEFT VENTRICULAR STROKE VOLUME: 74 ML
LVSV (TEICH): 74 ML
MV E'TISSUE VEL-LAT: 12 CM/S
MV E'TISSUE VEL-SEP: 7 CM/S
MV PEAK A VEL: 1.06 M/S
MV PEAK E VEL: 98 CM/S
RA PRESSURE ESTIMATED: 3 MMHG
RIGHT ATRIUM AREA SYSTOLE A4C: 14.5 CM2
RIGHT VENTRICLE ID DIMENSION: 3.5 CM
RV PSP: 39 MMHG
SL CV AV DECELERATION TIME RETROGRADE: 1583 MS
SL CV AV PEAK GRADIENT RETROGRADE: 86 MMHG
SL CV LEFT ATRIUM LENGTH A2C: 5.4 CM
SL CV LV EF: 55
SL CV PED ECHO LEFT VENTRICLE DIASTOLIC VOLUME (MOD BIPLANE) 2D: 143 ML
SL CV PED ECHO LEFT VENTRICLE SYSTOLIC VOLUME (MOD BIPLANE) 2D: 69 ML
TR MAX PG: 36 MMHG
TR PEAK VELOCITY: 3 M/S
TRICUSPID ANNULAR PLANE SYSTOLIC EXCURSION: 1.9 CM
TRICUSPID VALVE PEAK REGURGITATION VELOCITY: 3.01 M/S

## 2024-03-05 PROCEDURE — 93306 TTE W/DOPPLER COMPLETE: CPT

## 2024-03-05 PROCEDURE — 93306 TTE W/DOPPLER COMPLETE: CPT | Performed by: INTERNAL MEDICINE

## 2024-03-20 DIAGNOSIS — I10 ESSENTIAL HYPERTENSION: ICD-10-CM

## 2024-03-20 DIAGNOSIS — G43.109 MIGRAINE WITH AURA AND WITHOUT STATUS MIGRAINOSUS, NOT INTRACTABLE: ICD-10-CM

## 2024-03-20 DIAGNOSIS — E78.2 MIXED HYPERLIPIDEMIA: ICD-10-CM

## 2024-03-21 RX ORDER — LABETALOL 100 MG/1
100 TABLET, FILM COATED ORAL 2 TIMES DAILY
Qty: 180 TABLET | Refills: 0 | Status: SHIPPED | OUTPATIENT
Start: 2024-03-21

## 2024-03-21 RX ORDER — ROSUVASTATIN CALCIUM 5 MG/1
5 TABLET, COATED ORAL DAILY
Qty: 90 TABLET | Refills: 0 | Status: SHIPPED | OUTPATIENT
Start: 2024-03-21

## 2024-03-21 RX ORDER — PROTRIPTYLINE HYDROCHLORIDE 5 MG/1
5 TABLET, FILM COATED ORAL
Qty: 90 TABLET | Refills: 0 | Status: SHIPPED | OUTPATIENT
Start: 2024-03-21

## 2024-03-21 RX ORDER — TRANDOLAPRIL TABLETS 4 MG/1
4 TABLET ORAL DAILY
Qty: 90 TABLET | Refills: 0 | Status: SHIPPED | OUTPATIENT
Start: 2024-03-21

## 2024-04-10 LAB
ALBUMIN SERPL-MCNC: 4.2 G/DL (ref 3.6–5.1)
ALBUMIN/GLOB SERPL: 1.8 (CALC) (ref 1–2.5)
ALP SERPL-CCNC: 68 U/L (ref 37–153)
ALT SERPL-CCNC: 8 U/L (ref 6–29)
APPEARANCE UR: CLEAR
AST SERPL-CCNC: 16 U/L (ref 10–35)
BACTERIA UR QL AUTO: NORMAL /HPF
BASOPHILS # BLD AUTO: 31 CELLS/UL (ref 0–200)
BASOPHILS NFR BLD AUTO: 0.6 %
BILIRUB SERPL-MCNC: 0.6 MG/DL (ref 0.2–1.2)
BILIRUB UR QL STRIP: NEGATIVE
BUN SERPL-MCNC: 24 MG/DL (ref 7–25)
BUN/CREAT SERPL: ABNORMAL (CALC) (ref 6–22)
CALCIUM SERPL-MCNC: 9.5 MG/DL (ref 8.6–10.4)
CHLORIDE SERPL-SCNC: 102 MMOL/L (ref 98–110)
CHOLEST SERPL-MCNC: 197 MG/DL
CHOLEST/HDLC SERPL: 2.3 (CALC)
CO2 SERPL-SCNC: 26 MMOL/L (ref 20–32)
COLOR UR: YELLOW
CREAT SERPL-MCNC: 0.94 MG/DL (ref 0.5–1.05)
EOSINOPHIL # BLD AUTO: 151 CELLS/UL (ref 15–500)
EOSINOPHIL NFR BLD AUTO: 2.9 %
ERYTHROCYTE [DISTWIDTH] IN BLOOD BY AUTOMATED COUNT: 13.6 % (ref 11–15)
GFR/BSA.PRED SERPLBLD CYS-BASED-ARV: 66 ML/MIN/1.73M2
GLOBULIN SER CALC-MCNC: 2.4 G/DL (CALC) (ref 1.9–3.7)
GLUCOSE SERPL-MCNC: 105 MG/DL (ref 65–99)
GLUCOSE UR QL STRIP: NEGATIVE
HCT VFR BLD AUTO: 43.1 % (ref 35–45)
HDLC SERPL-MCNC: 86 MG/DL
HGB BLD-MCNC: 14.6 G/DL (ref 11.7–15.5)
HGB UR QL STRIP: NEGATIVE
HYALINE CASTS #/AREA URNS LPF: NORMAL /LPF
KETONES UR QL STRIP: NEGATIVE
LDLC SERPL CALC-MCNC: 90 MG/DL (CALC)
LEUKOCYTE ESTERASE UR QL STRIP: NEGATIVE
LYMPHOCYTES # BLD AUTO: 1648 CELLS/UL (ref 850–3900)
LYMPHOCYTES NFR BLD AUTO: 31.7 %
MCH RBC QN AUTO: 30.5 PG (ref 27–33)
MCHC RBC AUTO-ENTMCNC: 33.9 G/DL (ref 32–36)
MCV RBC AUTO: 90.2 FL (ref 80–100)
MONOCYTES # BLD AUTO: 343 CELLS/UL (ref 200–950)
MONOCYTES NFR BLD AUTO: 6.6 %
NEUTROPHILS # BLD AUTO: 3026 CELLS/UL (ref 1500–7800)
NEUTROPHILS NFR BLD AUTO: 58.2 %
NITRITE UR QL STRIP: NEGATIVE
NONHDLC SERPL-MCNC: 111 MG/DL (CALC)
PH UR STRIP: NORMAL [PH] (ref 5–8)
PLATELET # BLD AUTO: 221 THOUSAND/UL (ref 140–400)
PMV BLD REES-ECKER: 11.5 FL (ref 7.5–12.5)
POTASSIUM SERPL-SCNC: 4.6 MMOL/L (ref 3.5–5.3)
PROT SERPL-MCNC: 6.6 G/DL (ref 6.1–8.1)
PROT UR QL STRIP: NEGATIVE
RBC # BLD AUTO: 4.78 MILLION/UL (ref 3.8–5.1)
RBC #/AREA URNS HPF: NORMAL /HPF
SODIUM SERPL-SCNC: 138 MMOL/L (ref 135–146)
SP GR UR STRIP: 1.01 (ref 1–1.03)
SQUAMOUS #/AREA URNS HPF: NORMAL /HPF
TRIGL SERPL-MCNC: 118 MG/DL
WBC # BLD AUTO: 5.2 THOUSAND/UL (ref 3.8–10.8)
WBC #/AREA URNS HPF: NORMAL /HPF

## 2024-04-16 ENCOUNTER — RA CDI HCC (OUTPATIENT)
Dept: OTHER | Facility: HOSPITAL | Age: 69
End: 2024-04-16

## 2024-04-22 ENCOUNTER — OFFICE VISIT (OUTPATIENT)
Dept: INTERNAL MEDICINE CLINIC | Facility: CLINIC | Age: 69
End: 2024-04-22
Payer: COMMERCIAL

## 2024-04-22 VITALS
DIASTOLIC BLOOD PRESSURE: 72 MMHG | SYSTOLIC BLOOD PRESSURE: 138 MMHG | TEMPERATURE: 98 F | HEART RATE: 73 BPM | HEIGHT: 65 IN | OXYGEN SATURATION: 99 % | BODY MASS INDEX: 36.65 KG/M2 | WEIGHT: 220 LBS

## 2024-04-22 DIAGNOSIS — I10 ESSENTIAL HYPERTENSION: Primary | ICD-10-CM

## 2024-04-22 DIAGNOSIS — Z00.00 MEDICARE ANNUAL WELLNESS VISIT, SUBSEQUENT: ICD-10-CM

## 2024-04-22 DIAGNOSIS — E66.01 OBESITY, MORBID (HCC): ICD-10-CM

## 2024-04-22 DIAGNOSIS — I35.1 NONRHEUMATIC AORTIC (VALVE) INSUFFICIENCY: ICD-10-CM

## 2024-04-22 DIAGNOSIS — I77.819 AORTIC DILATATION (HCC): ICD-10-CM

## 2024-04-22 DIAGNOSIS — G43.109 MIGRAINE WITH AURA AND WITHOUT STATUS MIGRAINOSUS, NOT INTRACTABLE: ICD-10-CM

## 2024-04-22 PROCEDURE — G0439 PPPS, SUBSEQ VISIT: HCPCS | Performed by: INTERNAL MEDICINE

## 2024-04-22 PROCEDURE — 99214 OFFICE O/P EST MOD 30 MIN: CPT | Performed by: INTERNAL MEDICINE

## 2024-04-22 RX ORDER — LABETALOL 100 MG/1
100 TABLET, FILM COATED ORAL 2 TIMES DAILY
Start: 2024-04-22

## 2024-04-22 RX ORDER — TRANDOLAPRIL TABLETS 4 MG/1
4 TABLET ORAL DAILY
Start: 2024-04-22

## 2024-04-22 RX ORDER — VERAPAMIL HYDROCHLORIDE 80 MG/1
80 TABLET ORAL 3 TIMES DAILY
Start: 2024-04-22 | End: 2024-07-21

## 2024-04-22 RX ORDER — PROTRIPTYLINE HYDROCHLORIDE 5 MG/1
5 TABLET, FILM COATED ORAL
Start: 2024-04-22

## 2024-04-22 NOTE — PROGRESS NOTES
Assessment and Plan:     Problem List Items Addressed This Visit          Cardiovascular and Mediastinum    Nonrheumatic aortic (valve) insufficiency    Relevant Medications    labetalol (NORMODYNE) 100 mg tablet    verapamil (CALAN) 80 mg tablet    Migraine with aura and without status migrainosus, not intractable    Relevant Medications    labetalol (NORMODYNE) 100 mg tablet    protriptyline (VIVACTIL) 5 MG tablet    verapamil (CALAN) 80 mg tablet    Aortic dilatation (HCC)    Essential hypertension - Primary    Relevant Medications    trandolapril (MAVIK) 4 MG tablet    labetalol (NORMODYNE) 100 mg tablet    verapamil (CALAN) 80 mg tablet       Other    Obesity, morbid (HCC)     Other Visit Diagnoses       Medicare annual wellness visit, subsequent                 1. Essential hypertension  trandolapril (MAVIK) 4 MG tablet    labetalol (NORMODYNE) 100 mg tablet    verapamil (CALAN) 80 mg tablet    Readings are stable continue current regimen of labetalol trandolapril and verapamil.      2. Nonrheumatic aortic (valve) insufficiency      Following with cardiology and echo results reviewed.      3. Aortic dilatation (HCC)      Follow-up CT scan will be done in August.      4. Medicare annual wellness visit, subsequent        5. Migraine with aura and without status migrainosus, not intractable  protriptyline (VIVACTIL) 5 MG tablet    Migraines are stable and continue protriptyline.      6. Obesity, morbid (HCC)           Preventive health issues were discussed with patient, and age appropriate screening tests were ordered as noted in patient's After Visit Summary.  Personalized health advice and appropriate referrals for health education or preventive services given if needed, as noted in patient's After Visit Summary.     History of Present Illness:     Patient presents for a Medicare Wellness Visit    HPI   Patient Care Team:  Ross Griffith MD as PCP - General (Internal Medicine)  Ross Griffith MD as PCP -  [facility]P-[facility])  MD Deyvi Mazariegos MD     Review of Systems:     Review of Systems   Constitutional:  Negative for appetite change, chills, fatigue and fever.   HENT:  Negative for sore throat and trouble swallowing.    Eyes:  Negative for redness.   Respiratory:  Negative for shortness of breath.    Cardiovascular:  Negative for chest pain and palpitations.   Gastrointestinal:  Negative for abdominal pain, constipation and diarrhea.   Genitourinary:  Negative for dysuria and hematuria.   Musculoskeletal:  Negative for back pain and neck pain.   Skin:  Negative for rash.   Neurological:  Negative for seizures, weakness and headaches.   Hematological:  Negative for adenopathy.   Psychiatric/Behavioral:  Negative for confusion. The patient is not nervous/anxious.         Problem List:     Patient Active Problem List   Diagnosis    Nonrheumatic aortic (valve) insufficiency    Sleep apnea    Vitamin B12 deficiency    Migraine with aura and without status migrainosus, not intractable    Aortic dilatation (HCC)    Essential hypertension    Hyperlipidemia    Obesity (BMI 30.0-34.9)    Obesity, morbid (HCC)    Vitamin D deficiency    History of solitary pulmonary nodule      Past Medical and Surgical History:     Past Medical History:   Diagnosis Date    Allergic rhinitis     Aortic regurgitation     Chronic kidney disease 2022    Heart murmur 2006    HTN (hypertension)     Hyperlipidemia     Hypertension 2006    Nonrheumatic aortic (valve) insufficiency     Sleep apnea     Vitamin B12 deficiency      Past Surgical History:   Procedure Laterality Date    BUNIONECTOMY      CHOLECYSTECTOMY      COLONOSCOPY  07/02/2018    DILATION AND CURETTAGE OF UTERUS      MAMMO (HISTORICAL)  03/21/2017 5/18/2018 and 3/21/2017     [place] LAPAROSCOPIC APPENDECTOMY N/A 8/2/2021    Procedure: APPENDECTOMY LAPAROSCOPIC;  Surgeon: Percy Peñaloza MD;  Location: [facility];  Service: [facility]  TOOTH EXTRACTION        Family History:     Family History   Problem Relation Age of Onset    Coronary artery disease Mother     Kidney cancer Father     No Known Problems Sister     No Known Problems Sister     No Known Problems Sister       Social History:     Social History     Socioeconomic History    Marital status: /Civil Union     Spouse name: None    Number of children: 2    Years of education: None    Highest education level: None   Occupational History    None   Tobacco Use    Smoking status: Former     Current packs/day: 0.00     Average packs/day: 1 pack/day for 36.7 years (36.7 ttl pk-yrs)     Types: Cigarettes     Start date: 1968     Quit date: 2005     Years since quittin.3    Smokeless tobacco: Never   Vaping Use    Vaping status: Never Used   Substance and Sexual Activity    Alcohol use: Yes     Alcohol/week: 4.0 standard drinks of alcohol     Types: 4 Standard drinks or equivalent per week    Drug use: Never    Sexual activity: Not Currently     Partners: Male     Birth control/protection: None   Other Topics Concern    None   Social History Narrative    2 daughters    Travel outside [place]: No      Social Determinants of Health     Financial Resource Strain: Low Risk  (2023)    Overall Financial Resource Strain (CARDIA)     Difficulty of Paying Living Expenses: Not hard at all   Food Insecurity: No Food Insecurity (2024)    Hunger Vital Sign     Worried About Running Out of Food in the Last Year: Never true     Ran Out of Food in the Last Year: Never true   Transportation Needs: No Transportation Needs (2024)    PRAPARE - Transportation     Lack of Transportation (Medical): No     Lack of Transportation (Non-Medical): No   Physical Activity: Not on file   Stress: Not on file   Social Connections: Not on file   Intimate Partner Violence: Not on file   Housing Stability: Low Risk  (2024)    Housing Stability Vital Sign     Unable to Pay for Housing in the Last  Year: No     Number of Places Lived in the Last Year: 1     Unstable Housing in the Last Year: No      Medications and Allergies:     Current Outpatient Medications   Medication Sig Dispense Refill    acetaminophen (TYLENOL) 650 mg CR tablet Take 1 tablet (650 mg total) by mouth every 8 (eight) hours as needed for mild pain 30 tablet 0    Calcium 600 MG tablet Take 2 tablets by mouth daily      calcium citrate-vitamin D (CITRACAL+D) 315-200 MG-UNIT per tablet Take 1 tablet by mouth 2 (two) times a day      cholecalciferol (VITAMIN D3) 1,000 units tablet Take 1,000 Units by mouth daily 5,000 units daily twice a day      cyanocobalamin (VITAMIN B-12) 500 mcg tablet Take 1 tablet by mouth daily      labetalol (NORMODYNE) 100 mg tablet Take 1 tablet (100 mg total) by mouth 2 (two) times a day      Multiple Vitamins-Minerals (MULTIVITAMIN ADULT) TABS Take 1 tablet by mouth daily      protriptyline (VIVACTIL) 5 MG tablet Take 1 tablet (5 mg total) by mouth daily at bedtime      rosuvastatin (CRESTOR) 5 mg tablet Take 1 tablet (5 mg total) by mouth daily 90 tablet 0    trandolapril (MAVIK) 4 MG tablet Take 1 tablet (4 mg total) by mouth daily      verapamil (CALAN) 80 mg tablet Take 1 tablet (80 mg total) by mouth 3 (three) times a day      chlorhexidine (PERIDEX) 0.12 % solution RINSE BY MOUTH TWICE A DAY (Patient not taking: Reported on 8/9/2023)       No current facility-administered medications for this visit.     Allergies   Allergen Reactions    Penicillins Rash     Other reaction(s): PENICILLIN G POTASSIUM (PENICILLIN)        Immunizations:     Immunization History   Administered Date(s) Administered    COVID-19 PFIZER VACCINE 0.3 ML IM 02/24/2021, 03/19/2021, 10/03/2021, 10/03/2021    COVID-19 Pfizer Vac BIVALENT Esdras-sucrose 12 Yr+ IM 10/24/2022    COVID-19 Pfizer vac (Esdras-sucrose, gray cap) 12 yr+ IM 04/12/2022, 04/12/2022    INFLUENZA 12/10/2014, 10/01/2015, 10/10/2016, 09/07/2021, 09/01/2022    Influenza  Quadrivalent Preservative Free 3 years and older IM 09/30/2019    Influenza Split High Dose Preservative Free IM 09/08/2020    Influenza, Seasonal Vaccine, Quadrivalent, Adjuvanted, .5e 09/01/2022, 08/29/2023    Influenza, high dose seasonal 0.7 mL 09/08/2020, 09/07/2021    Influenza, injectable, quadrivalent, preservative free 0.5 mL 09/30/2019    Pneumococcal Conjugate 13-Valent 03/10/2020    Pneumococcal Polysaccharide PPV23 12/15/2010, 08/18/2021    Respiratory Syncytial Virus Vaccine (Recombinant, Adjuvanted) 08/29/2023    Tdap 05/20/2013, 08/29/2023    Zoster Vaccine Recombinant 04/14/2018    influenza, trivalent, adjuvanted 09/08/2020      Health Maintenance:         Topic Date Due    Hepatitis C Screening  Never done    Colorectal Cancer Screening  07/12/2024    Breast Cancer Screening: Mammogram  01/04/2025         Topic Date Due    Influenza Vaccine (1) 09/01/2023    COVID-19 Vaccine (8 - 2023-24 season) 09/01/2023      Medicare Screening Tests and Risk Assessments:     Amy is here for her Subsequent Wellness visit. Last Medicare Wellness visit information reviewed, patient interviewed and updates made to the record today.      Health Risk Assessment:   Patient rates overall health as very good. Patient feels that their physical health rating is slightly better. Patient is very satisfied with their life. Eyesight was rated as same. Hearing was rated as slightly worse. Patient feels that their emotional and mental health rating is same. Patients states they are never, rarely angry. Patient states they are sometimes unusually tired/fatigued. Pain experienced in the last 7 days has been none. Patient states that she has experienced no weight loss or gain in last 6 months.     Fall Risk Screening:   In the past year, patient has experienced: no history of falling in past year      Urinary Incontinence Screening:   Patient has not leaked urine accidently in the last six months.     Home Safety:  Patient does  not have trouble with stairs inside or outside of their home. Patient has working smoke alarms and has working carbon monoxide detector. Home safety hazards include: none.     Nutrition:   Current diet is Low Carb.     Medications:   Patient is currently taking over-the-counter supplements. OTC medications include: see medication list. Patient is able to manage medications.     Activities of Daily Living (ADLs)/Instrumental Activities of Daily Living (IADLs):   Walk and transfer into and out of bed and chair?: Yes  Dress and groom yourself?: Yes    Bathe or shower yourself?: Yes    Feed yourself? Yes  Do your laundry/housekeeping?: Yes  Manage your money, pay your bills and track your expenses?: Yes  Make your own meals?: Yes    Do your own shopping?: Yes    Previous Hospitalizations:   Any hospitalizations or ED visits within the last 12 months?: No      Advance Care Planning:   Living will: Yes    Durable POA for healthcare: Yes    Advanced directive: Yes      PREVENTIVE SCREENINGS      Cardiovascular Screening:    General: Screening Not Indicated and History Lipid Disorder      Diabetes Screening:     General: Screening Current      Colorectal Cancer Screening:     General: Screening Current      Breast Cancer Screening:     General: Screening Current      Cervical Cancer Screening:    General: Screening Not Indicated      Lung Cancer Screening:     General: Screening Not Indicated    Screening, Brief Intervention, and Referral to Treatment (SBIRT)    Screening  Typical number of drinks in a day: 0  Typical number of drinks in a week: 3  Interpretation: Low risk drinking behavior.    AUDIT-C Screenin) How often did you have a drink containing alcohol in the past year? 2 to 4 times a month  2) How many drinks did you have on a typical day when you were drinking in the past year? 1 to 2  3) How often did you have 6 or more drinks on one occasion in the past year? never    AUDIT-C Score: 2  Interpretation:  "Score 0-2 (female): Negative screen for alcohol misuse    Single Item Drug Screening:  How often have you used an illegal drug (including marijuana) or a prescription medication for non-medical reasons in the past year? never    Single Item Drug Screen Score: 0  Interpretation: Negative screen for possible drug use disorder    Other Counseling Topics:   Car/seat belt/driving safety, skin self-exam, sunscreen and calcium and vitamin D intake and regular weightbearing exercise.     No results found.     Physical Exam:     /72 (BP Location: Left arm, Patient Position: Sitting, Cuff Size: Large)   Pulse 73   Temp 98 °F (36.7 °C) (Temporal)   Ht 5' 5\" (1.651 m)   Wt 99.8 kg (220 lb)   SpO2 99%   BMI 36.61 kg/m²     Physical Exam  Vitals and nursing note reviewed.   Constitutional:       General: She is not in acute distress.     Appearance: She is well-developed.   HENT:      Head: Normocephalic and atraumatic.      Mouth/Throat:      Mouth: Mucous membranes are moist.   Eyes:      Conjunctiva/sclera: Conjunctivae normal.   Cardiovascular:      Rate and Rhythm: Normal rate and regular rhythm.      Heart sounds: Murmur heard.   Pulmonary:      Effort: Pulmonary effort is normal. No respiratory distress.      Breath sounds: Normal breath sounds.   Abdominal:      Palpations: Abdomen is soft.      Tenderness: There is no abdominal tenderness.   Musculoskeletal:         General: No swelling.      Cervical back: Normal range of motion and neck supple.   Skin:     General: Skin is warm and dry.      Capillary Refill: Capillary refill takes less than 2 seconds.   Neurological:      General: No focal deficit present.      Mental Status: She is alert.   Psychiatric:         Mood and Affect: Mood normal.          Ross Griffith MD  "

## 2024-04-23 NOTE — PROGRESS NOTES
Cardiology Follow Up    Amy Can  1955  [id number]  [facility] [place]  [address]  [place] [address] [phone]    1. Essential (primary) hypertension  Echo complete w/ contrast if indicated    POCT ECG      2. LVH (left ventricular hypertrophy)  Echo complete w/ contrast if indicated    POCT ECG      3. Aortic valve disease  Echo complete w/ contrast if indicated    POCT ECG        Interval History:  Patient is here for a f/u visit. Patient has AVD.  Lipid profile 4/2024 demonstrated total cholesterol of 197 with an HDL of 86 and a calculated LDL of 90.  Echo 3/5/2024 demonstrated LVEF of 55%.  Moderate AI with mild AS was noted with mean gradient across the valve of 15 mmHg.  There is mild TR with a mild elevation in PASP.  Ascending aorta was 4.3 cm.  Peak velocity across the AV was 2.48 m/s.  LVOT peak velocity was 1.32 m/s.  There was no significant change compared to 2/18/2022.  CT of the chest 8/1/2023 demonstrated fusiform ectasia of the ascending thoracic aorta at 4.1 cm.  Vital signs are stable today.  EKG today demonstrates NSR with PRWP with no significant change compared to a prior tracing 7/29/2021.  Patient is HTN and aortic valve disease are stable on her current medicine.  Patient has had no angina or significant dyspnea.    Patient Active Problem List   Diagnosis   • Nonrheumatic aortic (valve) insufficiency   • Sleep apnea   • Vitamin B12 deficiency   • Migraine with aura and without status migrainosus, not intractable   • Aortic dilatation (HCC)   • Essential hypertension   • Hyperlipidemia   • Obesity (BMI 30.0-34.9)   • Obesity, morbid (HCC)   • Vitamin D deficiency   • History of solitary pulmonary nodule     Past Medical History:   Diagnosis Date   • Allergic rhinitis    • Aortic regurgitation    • Chronic kidney disease 2022   • Heart murmur 2006   • HTN (hypertension)    • Hyperlipidemia     • Hypertension    • Nonrheumatic aortic (valve) insufficiency    • Sleep apnea    • Vitamin B12 deficiency      Social History     Socioeconomic History   • Marital status: /Civil Union     Spouse name: Not on file   • Number of children: 2   • Years of education: Not on file   • Highest education level: Not on file   Occupational History   • Not on file   Tobacco Use   • Smoking status: Former     Current packs/day: 0.00     Average packs/day: 1 pack/day for 36.7 years (36.7 ttl pk-yrs)     Types: Cigarettes     Start date: 1968     Quit date: 2005     Years since quittin.3   • Smokeless tobacco: Never   Vaping Use   • Vaping status: Never Used   Substance and Sexual Activity   • Alcohol use: Yes     Alcohol/week: 4.0 standard drinks of alcohol     Types: 4 Standard drinks or equivalent per week   • Drug use: Never   • Sexual activity: Not Currently     Partners: Male     Birth control/protection: None   Other Topics Concern   • Not on file   Social History Narrative    2 daughters    Travel outside [place]: No      Social Determinants of Health     Financial Resource Strain: Low Risk  (2023)    Overall Financial Resource Strain (CARDIA)    • Difficulty of Paying Living Expenses: Not hard at all   Food Insecurity: No Food Insecurity (2024)    Hunger Vital Sign    • Worried About Running Out of Food in the Last Year: Never true    • Ran Out of Food in the Last Year: Never true   Transportation Needs: No Transportation Needs (2024)    PRAPARE - Transportation    • Lack of Transportation (Medical): No    • Lack of Transportation (Non-Medical): No   Physical Activity: Not on file   Stress: Not on file   Social Connections: Not on file   Intimate Partner Violence: Not on file   Housing Stability: Low Risk  (2024)    Housing Stability Vital Sign    • Unable to Pay for Housing in the Last Year: No    • Number of Places Lived in the Last Year: 1    • Unstable Housing in the Last  Year: No      Family History   Problem Relation Age of Onset   • Coronary artery disease Mother    • Kidney cancer Father    • No Known Problems Sister    • No Known Problems Sister    • No Known Problems Sister      Past Surgical History:   Procedure Laterality Date   • BUNIONECTOMY     • CHOLECYSTECTOMY     • COLONOSCOPY  07/02/2018   • DILATION AND CURETTAGE OF UTERUS     • MAMMO (HISTORICAL)  03/21/2017 5/18/2018 and 3/21/2017    • [place] LAPAROSCOPIC APPENDECTOMY N/A 8/2/2021    Procedure: APPENDECTOMY LAPAROSCOPIC;  Surgeon: Percy Peñaloza MD;  Location: [facility];  Service: Colorectal   • WISDOM TOOTH EXTRACTION         Current Outpatient Medications:   •  acetaminophen (TYLENOL) 650 mg CR tablet, Take 1 tablet (650 mg total) by mouth every 8 (eight) hours as needed for mild pain, Disp: 30 tablet, Rfl: 0  •  Calcium 600 MG tablet, Take 2 tablets by mouth daily, Disp: , Rfl:   •  calcium citrate-vitamin D (CITRACAL+D) 315-200 MG-UNIT per tablet, Take 1 tablet by mouth 2 (two) times a day, Disp: , Rfl:   •  cholecalciferol (VITAMIN D3) 1,000 units tablet, Take 1,000 Units by mouth daily 5,000 units daily twice a day, Disp: , Rfl:   •  cyanocobalamin (VITAMIN B-12) 500 mcg tablet, Take 1 tablet by mouth daily, Disp: , Rfl:   •  labetalol (NORMODYNE) 100 mg tablet, Take 1 tablet (100 mg total) by mouth 2 (two) times a day, Disp: , Rfl:   •  Multiple Vitamins-Minerals (MULTIVITAMIN ADULT) TABS, Take 1 tablet by mouth daily, Disp: , Rfl:   •  protriptyline (VIVACTIL) 5 MG tablet, Take 1 tablet (5 mg total) by mouth daily at bedtime, Disp: , Rfl:   •  rosuvastatin (CRESTOR) 5 mg tablet, Take 1 tablet (5 mg total) by mouth daily, Disp: 90 tablet, Rfl: 0  •  trandolapril (MAVIK) 4 MG tablet, Take 1 tablet (4 mg total) by mouth daily, Disp: , Rfl:   •  verapamil (CALAN) 80 mg tablet, Take 1 tablet (80 mg total) by mouth 3 (three) times a day, Disp: , Rfl:   •  chlorhexidine (PERIDEX) 0.12 % solution, RINSE BY MOUTH  "TWICE A DAY (Patient not taking: Reported on 8/9/2023), Disp: , Rfl:   Allergies   Allergen Reactions   • Penicillins Rash     Other reaction(s): PENICILLIN G POTASSIUM (PENICILLIN)         Labs:not applicable  Imaging: No results found.    Review of Systems:  Review of Systems   All other systems reviewed and are negative.      Physical Exam:  /70 (BP Location: Left arm, Patient Position: Sitting, Cuff Size: Large)   Pulse 67   Ht 5' 5\" (1.651 m)   Wt 99.8 kg (220 lb)   SpO2 97%   BMI 36.61 kg/m²   Physical Exam  Vitals reviewed.   Constitutional:       Appearance: She is well-developed.   HENT:      Head: Normocephalic and atraumatic.   Eyes:      Conjunctiva/sclera: Conjunctivae normal.      Pupils: Pupils are equal, round, and reactive to light.   Cardiovascular:      Rate and Rhythm: Normal rate.      Heart sounds: Murmur heard.   Pulmonary:      Effort: Pulmonary effort is normal.      Breath sounds: Normal breath sounds.   Musculoskeletal:      Cervical back: Normal range of motion and neck supple.   Skin:     General: Skin is warm and dry.   Neurological:      Mental Status: She is alert and oriented to person, place, and time.         Discussion/Summary:I will continue the patient's present medical regimen.  The patient appears well compensated.  I have asked the patient to call if there is a problem in the interim otherwise I will see the patient in one years time. Patient is due for an echocardiogram prior to the next visit to assess LV wall thickness and systolic function.  "

## 2024-05-03 ENCOUNTER — OFFICE VISIT (OUTPATIENT)
Dept: CARDIOLOGY CLINIC | Facility: CLINIC | Age: 69
End: 2024-05-03
Payer: COMMERCIAL

## 2024-05-03 VITALS
SYSTOLIC BLOOD PRESSURE: 130 MMHG | HEIGHT: 65 IN | DIASTOLIC BLOOD PRESSURE: 70 MMHG | OXYGEN SATURATION: 97 % | HEART RATE: 67 BPM | WEIGHT: 220 LBS | BODY MASS INDEX: 36.65 KG/M2

## 2024-05-03 DIAGNOSIS — I10 ESSENTIAL (PRIMARY) HYPERTENSION: Primary | ICD-10-CM

## 2024-05-03 DIAGNOSIS — I35.9 AORTIC VALVE DISEASE: ICD-10-CM

## 2024-05-03 DIAGNOSIS — I51.7 LVH (LEFT VENTRICULAR HYPERTROPHY): ICD-10-CM

## 2024-05-03 PROCEDURE — 99214 OFFICE O/P EST MOD 30 MIN: CPT | Performed by: INTERNAL MEDICINE

## 2024-05-03 PROCEDURE — 93000 ELECTROCARDIOGRAM COMPLETE: CPT | Performed by: INTERNAL MEDICINE

## 2024-05-03 NOTE — PATIENT INSTRUCTIONS
I will continue the patient's present medical regimen.  The patient appears well compensated.  I have asked the patient to call if there is a problem in the interim otherwise I will see the patient in one years time. Patient is due for an echocardiogram prior to the next visit to assess LV wall thickness and systolic function.

## 2024-06-23 DIAGNOSIS — G43.109 MIGRAINE WITH AURA AND WITHOUT STATUS MIGRAINOSUS, NOT INTRACTABLE: ICD-10-CM

## 2024-06-23 DIAGNOSIS — I10 ESSENTIAL HYPERTENSION: ICD-10-CM

## 2024-06-23 DIAGNOSIS — E78.2 MIXED HYPERLIPIDEMIA: ICD-10-CM

## 2024-06-24 RX ORDER — TRANDOLAPRIL TABLETS 4 MG/1
4 TABLET ORAL DAILY
Qty: 90 TABLET | Refills: 1 | Status: SHIPPED | OUTPATIENT
Start: 2024-06-24

## 2024-06-24 RX ORDER — LABETALOL 100 MG/1
100 TABLET, FILM COATED ORAL 2 TIMES DAILY
Qty: 180 TABLET | Refills: 1 | Status: SHIPPED | OUTPATIENT
Start: 2024-06-24

## 2024-06-24 RX ORDER — PROTRIPTYLINE HYDROCHLORIDE 5 MG/1
5 TABLET, FILM COATED ORAL
Qty: 90 TABLET | Refills: 1 | Status: SHIPPED | OUTPATIENT
Start: 2024-06-24

## 2024-06-24 RX ORDER — ROSUVASTATIN CALCIUM 5 MG/1
5 TABLET, COATED ORAL DAILY
Qty: 90 TABLET | Refills: 1 | Status: SHIPPED | OUTPATIENT
Start: 2024-06-24

## 2024-07-19 ENCOUNTER — RA CDI HCC (OUTPATIENT)
Dept: OTHER | Facility: HOSPITAL | Age: 69
End: 2024-07-19

## 2024-07-22 ENCOUNTER — CONSULT (OUTPATIENT)
Dept: INTERNAL MEDICINE CLINIC | Facility: CLINIC | Age: 69
End: 2024-07-22
Payer: COMMERCIAL

## 2024-07-22 VITALS
HEART RATE: 76 BPM | SYSTOLIC BLOOD PRESSURE: 118 MMHG | HEIGHT: 65 IN | OXYGEN SATURATION: 98 % | WEIGHT: 223 LBS | DIASTOLIC BLOOD PRESSURE: 74 MMHG | TEMPERATURE: 97.5 F | BODY MASS INDEX: 37.15 KG/M2

## 2024-07-22 DIAGNOSIS — Z01.818 PRE-OPERATIVE EXAMINATION: Primary | ICD-10-CM

## 2024-07-22 DIAGNOSIS — H25.9 AGE-RELATED CATARACT OF BOTH EYES, UNSPECIFIED AGE-RELATED CATARACT TYPE: ICD-10-CM

## 2024-07-22 DIAGNOSIS — I35.1 NONRHEUMATIC AORTIC (VALVE) INSUFFICIENCY: ICD-10-CM

## 2024-07-22 DIAGNOSIS — I10 ESSENTIAL HYPERTENSION: ICD-10-CM

## 2024-07-22 PROCEDURE — 99214 OFFICE O/P EST MOD 30 MIN: CPT | Performed by: INTERNAL MEDICINE

## 2024-07-22 PROCEDURE — G2211 COMPLEX E/M VISIT ADD ON: HCPCS | Performed by: INTERNAL MEDICINE

## 2024-07-22 RX ORDER — BROMFENAC SODIUM 0.7 MG/ML
SOLUTION/ DROPS OPHTHALMIC
COMMUNITY
Start: 2024-07-18

## 2024-07-22 NOTE — PROGRESS NOTES
Assessment/Plan:    Diagnoses and all orders for this visit:    Pre-operative examination  Comments:  Patient is at low risk of for low risk of procedure.    Nonrheumatic aortic (valve) insufficiency  Comments:  Stable, asymptomatic, follows up with cardiology    Essential hypertension  Comments:  Stable, continue current medications, okay to take her medications with sips of water on the morning of the surgery    Age-related cataract of both eyes, unspecified age-related cataract type  Comments:  Planned for bilateral cataract surgery on 7/30/2024, 8/13 on the right and left respectively, [facility] [place], by Dr. Guzmán    Other orders  -     Prolensa 0.07 % SOLN              There are no Patient Instructions on file for this visit.    Subjective:      Patient ID: Amy Can is a 68 y.o. female    HPI      Current Outpatient Medications:     acetaminophen (TYLENOL) 650 mg CR tablet, Take 1 tablet (650 mg total) by mouth every 8 (eight) hours as needed for mild pain, Disp: 30 tablet, Rfl: 0    Calcium 600 MG tablet, Take 2 tablets by mouth daily, Disp: , Rfl:     cholecalciferol (VITAMIN D3) 1,000 units tablet, Take 1,000 Units by mouth daily 5,000 units daily twice a day, Disp: , Rfl:     cyanocobalamin (VITAMIN B-12) 500 mcg tablet, Take 1 tablet by mouth daily, Disp: , Rfl:     labetalol (NORMODYNE) 100 mg tablet, Take 1 tablet (100 mg total) by mouth 2 (two) times a day, Disp: 180 tablet, Rfl: 1    Multiple Vitamins-Minerals (MULTIVITAMIN ADULT) TABS, Take 1 tablet by mouth daily, Disp: , Rfl:     Prolensa 0.07 % SOLN, , Disp: , Rfl:     protriptyline (VIVACTIL) 5 MG tablet, Take 1 tablet (5 mg total) by mouth daily at bedtime, Disp: 90 tablet, Rfl: 1    rosuvastatin (CRESTOR) 5 mg tablet, Take 1 tablet (5 mg total) by mouth daily, Disp: 90 tablet, Rfl: 1    trandolapril (MAVIK) 4 MG tablet, Take 1 tablet (4 mg total) by mouth daily, Disp: 90 tablet, Rfl: 1    verapamil (CALAN) 80 mg tablet, Take 1  tablet (80 mg total) by mouth 3 (three) times a day, Disp: , Rfl:     calcium citrate-vitamin D (CITRACAL+D) 315-200 MG-UNIT per tablet, Take 1 tablet by mouth 2 (two) times a day (Patient not taking: Reported on 7/22/2024), Disp: , Rfl:     chlorhexidine (PERIDEX) 0.12 % solution, RINSE BY MOUTH TWICE A DAY (Patient not taking: Reported on 8/9/2023), Disp: , Rfl:      Past Medical History:   Diagnosis Date    Allergic rhinitis     Aortic regurgitation     Chronic kidney disease 2022    Heart murmur 2006    HTN (hypertension)     Hyperlipidemia     Hypertension 2006    Nonrheumatic aortic (valve) insufficiency     Sleep apnea     Vitamin B12 deficiency          Past Surgical History:   Procedure Laterality Date    BUNIONECTOMY      CHOLECYSTECTOMY      COLONOSCOPY  07/02/2018    DILATION AND CURETTAGE OF UTERUS      MAMMO (HISTORICAL)  03/21/2017 5/18/2018 and 3/21/2017     [place] LAPAROSCOPIC APPENDECTOMY N/A 8/2/2021    Procedure: APPENDECTOMY LAPAROSCOPIC;  Surgeon: Percy Peñaloza MD;  Location: [facility];  Service: Colorectal    WISDOM TOOTH EXTRACTION           Allergies   Allergen Reactions    Penicillins Rash     Other reaction(s): PENICILLIN G POTASSIUM (PENICILLIN)         No results found for this or any previous visit (from the past 1008 hour(s)).    The following portions of the patient's history were reviewed and updated as appropriate: allergies, current medications, past family history, past medical history, past social history, past surgical history and problem list.     Review of Systems   Constitutional:  Negative for appetite change, chills, diaphoresis, fatigue, fever and unexpected weight change.   Respiratory:  Negative for apnea, cough, choking, chest tightness, shortness of breath, wheezing and stridor.    Cardiovascular:  Negative for chest pain, palpitations and leg swelling.   Gastrointestinal:  Negative for abdominal distention, abdominal pain, anal bleeding, blood in stool,  constipation, diarrhea, nausea and vomiting.   Genitourinary:  Negative for decreased urine volume, difficulty urinating, frequency and urgency.   Musculoskeletal:  Negative for arthralgias, back pain and myalgias.   Neurological:  Negative for dizziness, light-headedness, numbness and headaches.         Objective:      Vitals:    07/22/24 0933   BP: 118/74   Pulse: 76   Temp: 97.5 °F (36.4 °C)   SpO2: 98%          Physical Exam  Vitals reviewed.   Constitutional:       General: She is not in acute distress.     Appearance: Normal appearance. She is not ill-appearing, toxic-appearing or diaphoretic.   HENT:      Mouth/Throat:      Mouth: Mucous membranes are moist.   Cardiovascular:      Rate and Rhythm: Normal rate and regular rhythm.      Pulses: Normal pulses.      Heart sounds: Murmur heard.      No friction rub. No gallop.   Pulmonary:      Effort: Pulmonary effort is normal. No respiratory distress.      Breath sounds: Normal breath sounds. No stridor. No wheezing, rhonchi or rales.   Chest:      Chest wall: No tenderness.   Musculoskeletal:      Right lower leg: No edema.      Left lower leg: No edema.   Skin:     General: Skin is warm and dry.      Findings: No lesion or rash.   Neurological:      Mental Status: She is alert and oriented to person, place, and time.

## 2024-08-15 DIAGNOSIS — I10 ESSENTIAL HYPERTENSION: ICD-10-CM

## 2024-08-16 RX ORDER — VERAPAMIL HYDROCHLORIDE 80 MG/1
80 TABLET ORAL 3 TIMES DAILY
Qty: 270 TABLET | Refills: 1 | Status: SHIPPED | OUTPATIENT
Start: 2024-08-16

## 2024-08-21 ENCOUNTER — OFFICE VISIT (OUTPATIENT)
Dept: INTERNAL MEDICINE CLINIC | Facility: CLINIC | Age: 69
End: 2024-08-21
Payer: COMMERCIAL

## 2024-08-21 VITALS
BODY MASS INDEX: 37.32 KG/M2 | WEIGHT: 224 LBS | SYSTOLIC BLOOD PRESSURE: 134 MMHG | HEIGHT: 65 IN | HEART RATE: 72 BPM | OXYGEN SATURATION: 98 % | TEMPERATURE: 98.3 F | DIASTOLIC BLOOD PRESSURE: 68 MMHG

## 2024-08-21 DIAGNOSIS — Z87.891 HISTORY OF SMOKING 30 OR MORE PACK YEARS: ICD-10-CM

## 2024-08-21 DIAGNOSIS — I77.819 AORTIC DILATATION (HCC): ICD-10-CM

## 2024-08-21 DIAGNOSIS — E78.2 MIXED HYPERLIPIDEMIA: ICD-10-CM

## 2024-08-21 DIAGNOSIS — I35.1 NONRHEUMATIC AORTIC (VALVE) INSUFFICIENCY: ICD-10-CM

## 2024-08-21 DIAGNOSIS — I10 ESSENTIAL HYPERTENSION: Primary | ICD-10-CM

## 2024-08-21 DIAGNOSIS — Z12.11 SCREEN FOR COLON CANCER: ICD-10-CM

## 2024-08-21 PROCEDURE — 99214 OFFICE O/P EST MOD 30 MIN: CPT | Performed by: INTERNAL MEDICINE

## 2024-08-21 PROCEDURE — G2211 COMPLEX E/M VISIT ADD ON: HCPCS | Performed by: INTERNAL MEDICINE

## 2024-08-21 NOTE — PROGRESS NOTES
Assessment/Plan:           1. Essential hypertension  Comments:  Stable continue to monitor at home and continue labetalol trandolapril and verapamil.  Orders:  -     Magnesium; Future  2. Screen for colon cancer  -     Ambulatory Referral to Gastroenterology; Future  3. Nonrheumatic aortic (valve) insufficiency  Comments:  Stenosis as well as regurgitation.  Following with cardiology and getting echocardiograms done.  4. Mixed hyperlipidemia  Comments:  Continue rosuvastatin.  Orders:  -     CBC and differential; Future  -     Comprehensive metabolic panel; Future  -     Lipid panel; Future  -     TSH, 3rd generation; Future  -     Urinalysis with microscopic; Future  5. Aortic dilatation (HCC)  6. History of smoking 30 or more pack years  -     CT lung screening program; Future; Expected date: 08/21/2024         1. Essential hypertension      2. Screen for colon cancer    - Ambulatory Referral to Gastroenterology; Future    3. Nonrheumatic aortic (valve) insufficiency      4. Mixed hyperlipidemia         No problem-specific Assessment & Plan notes found for this encounter.           Subjective:      Patient ID: Amy Can is a 68 y.o. female.    HPI    The following portions of the patient's history were reviewed and updated as appropriate: She  has a past medical history of Allergic rhinitis, Aortic regurgitation, Chronic kidney disease (2022), Heart murmur (2006), HTN (hypertension), Hyperlipidemia, Hypertension (2006), Nonrheumatic aortic (valve) insufficiency, Sleep apnea, and Vitamin B12 deficiency.  She   Patient Active Problem List    Diagnosis Date Noted    History of solitary pulmonary nodule 08/16/2023    Obesity, morbid (HCC) 12/20/2021    Vitamin D deficiency 12/20/2021    Essential hypertension     Hyperlipidemia     Obesity (BMI 30.0-34.9)     Migraine with aura and without status migrainosus, not intractable 04/07/2021    Aortic dilatation (HCC) 04/07/2021    Nonrheumatic aortic (valve)  insufficiency     Sleep apnea     Vitamin B12 deficiency      She  has a past surgical history that includes Dilation and curettage of uterus; [place] tooth extraction; Bunionectomy; Cholecystectomy; Colonoscopy (07/02/2018); Mammo (historical) (03/21/2017); and pr laparoscopic appendectomy (N/A, 8/2/2021).  Her family history includes Coronary artery disease in her mother; Kidney cancer in her father; No Known Problems in her sister, sister, and sister.  She  reports that she quit smoking about 19 years ago. Her smoking use included cigarettes. She started smoking about 56 years ago. She has a 36.7 pack-year smoking history. She has never used smokeless tobacco. She reports current alcohol use of about 4.0 standard drinks of alcohol per week. She reports that she does not use drugs.  Current Outpatient Medications   Medication Sig Dispense Refill    acetaminophen (TYLENOL) 650 mg CR tablet Take 1 tablet (650 mg total) by mouth every 8 (eight) hours as needed for mild pain 30 tablet 0    Calcium 600 MG tablet Take 2 tablets by mouth daily      cholecalciferol (VITAMIN D3) 1,000 units tablet Take 1,000 Units by mouth daily 5,000 units daily twice a day      cyanocobalamin (VITAMIN B-12) 500 mcg tablet Take 1 tablet by mouth daily      labetalol (NORMODYNE) 100 mg tablet Take 1 tablet (100 mg total) by mouth 2 (two) times a day 180 tablet 1    Multiple Vitamins-Minerals (MULTIVITAMIN ADULT) TABS Take 1 tablet by mouth daily      Prolensa 0.07 % SOLN       protriptyline (VIVACTIL) 5 MG tablet Take 1 tablet (5 mg total) by mouth daily at bedtime 90 tablet 1    rosuvastatin (CRESTOR) 5 mg tablet Take 1 tablet (5 mg total) by mouth daily 90 tablet 1    trandolapril (MAVIK) 4 MG tablet Take 1 tablet (4 mg total) by mouth daily 90 tablet 1    verapamil (CALAN) 80 mg tablet take 1 tablet by mouth three times a day 270 tablet 1    calcium citrate-vitamin D (CITRACAL+D) 315-200 MG-UNIT per tablet Take 1 tablet by mouth 2 (two)  times a day (Patient not taking: Reported on 7/22/2024)      chlorhexidine (PERIDEX) 0.12 % solution RINSE BY MOUTH TWICE A DAY (Patient not taking: Reported on 8/9/2023)       No current facility-administered medications for this visit.     Current Outpatient Medications on File Prior to Visit   Medication Sig    acetaminophen (TYLENOL) 650 mg CR tablet Take 1 tablet (650 mg total) by mouth every 8 (eight) hours as needed for mild pain    Calcium 600 MG tablet Take 2 tablets by mouth daily    cholecalciferol (VITAMIN D3) 1,000 units tablet Take 1,000 Units by mouth daily 5,000 units daily twice a day    cyanocobalamin (VITAMIN B-12) 500 mcg tablet Take 1 tablet by mouth daily    labetalol (NORMODYNE) 100 mg tablet Take 1 tablet (100 mg total) by mouth 2 (two) times a day    Multiple Vitamins-Minerals (MULTIVITAMIN ADULT) TABS Take 1 tablet by mouth daily    Prolensa 0.07 % SOLN     protriptyline (VIVACTIL) 5 MG tablet Take 1 tablet (5 mg total) by mouth daily at bedtime    rosuvastatin (CRESTOR) 5 mg tablet Take 1 tablet (5 mg total) by mouth daily    trandolapril (MAVIK) 4 MG tablet Take 1 tablet (4 mg total) by mouth daily    verapamil (CALAN) 80 mg tablet take 1 tablet by mouth three times a day    calcium citrate-vitamin D (CITRACAL+D) 315-200 MG-UNIT per tablet Take 1 tablet by mouth 2 (two) times a day (Patient not taking: Reported on 7/22/2024)    chlorhexidine (PERIDEX) 0.12 % solution RINSE BY MOUTH TWICE A DAY (Patient not taking: Reported on 8/9/2023)     No current facility-administered medications on file prior to visit.     There are no discontinued medications.   She is allergic to penicillins..    Review of Systems   Constitutional:  Negative for appetite change, chills, fatigue and fever.   HENT:  Negative for sore throat and trouble swallowing.    Eyes:  Negative for redness.   Respiratory:  Negative for shortness of breath.    Cardiovascular:  Negative for chest pain and palpitations.  "  Gastrointestinal:  Negative for abdominal pain, constipation and diarrhea.   Genitourinary:  Negative for dysuria and hematuria.   Musculoskeletal:  Negative for back pain and neck pain.   Skin:  Negative for rash.   Neurological:  Negative for seizures, weakness and headaches.   Hematological:  Negative for adenopathy.   Psychiatric/Behavioral:  Negative for confusion. The patient is not nervous/anxious.          Objective:      /68 (BP Location: Left arm, Patient Position: Sitting, Cuff Size: Large)   Pulse 72   Temp 98.3 °F (36.8 °C) (Temporal)   Ht 5' 5\" (1.651 m)   Wt 102 kg (224 lb)   SpO2 98%   BMI 37.28 kg/m²     Results Reviewed       None            No results found for this or any previous visit (from the past 1344 hour(s)).     Physical Exam  Constitutional:       General: She is not in acute distress.     Appearance: Normal appearance.   HENT:      Head: Normocephalic and atraumatic.      Right Ear: Tympanic membrane normal.      Left Ear: Tympanic membrane normal.      Nose: Nose normal.      Mouth/Throat:      Mouth: Mucous membranes are moist.   Eyes:      Extraocular Movements: Extraocular movements intact.      Pupils: Pupils are equal, round, and reactive to light.   Cardiovascular:      Rate and Rhythm: Normal rate and regular rhythm.      Pulses: Normal pulses.      Heart sounds: Murmur heard.      No friction rub.   Pulmonary:      Effort: Pulmonary effort is normal. No respiratory distress.      Breath sounds: Normal breath sounds. No wheezing.   Abdominal:      General: Abdomen is flat. Bowel sounds are normal. There is no distension.      Palpations: Abdomen is soft. There is no mass.      Tenderness: There is no abdominal tenderness. There is no guarding.   Musculoskeletal:         General: Normal range of motion.      Cervical back: Normal range of motion and neck supple.   Skin:     General: Skin is warm.   Neurological:      General: No focal deficit present.      Mental " Status: She is alert and oriented to person, place, and time. Mental status is at baseline.      Cranial Nerves: No cranial nerve deficit.   Psychiatric:         Mood and Affect: Mood normal.         Behavior: Behavior normal.

## 2024-09-05 DIAGNOSIS — I10 ESSENTIAL HYPERTENSION: ICD-10-CM

## 2024-09-06 RX ORDER — VERAPAMIL HYDROCHLORIDE 80 MG/1
80 TABLET ORAL 3 TIMES DAILY
Qty: 270 TABLET | Refills: 0 | OUTPATIENT
Start: 2024-09-06

## 2024-11-28 LAB
ALBUMIN SERPL-MCNC: 4.1 G/DL (ref 3.6–5.1)
ALBUMIN/GLOB SERPL: 1.9 (CALC) (ref 1–2.5)
ALP SERPL-CCNC: 67 U/L (ref 37–153)
ALT SERPL-CCNC: 8 U/L (ref 6–29)
APPEARANCE UR: CLEAR
AST SERPL-CCNC: 13 U/L (ref 10–35)
BACTERIA UR QL AUTO: ABNORMAL /HPF
BASOPHILS # BLD AUTO: 47 CELLS/UL (ref 0–200)
BASOPHILS NFR BLD AUTO: 0.8 %
BILIRUB SERPL-MCNC: 0.6 MG/DL (ref 0.2–1.2)
BILIRUB UR QL STRIP: NEGATIVE
BUN SERPL-MCNC: 24 MG/DL (ref 7–25)
BUN/CREAT SERPL: NORMAL (CALC) (ref 6–22)
CALCIUM SERPL-MCNC: 9 MG/DL (ref 8.6–10.4)
CHLORIDE SERPL-SCNC: 102 MMOL/L (ref 98–110)
CHOLEST SERPL-MCNC: 199 MG/DL
CHOLEST/HDLC SERPL: 2.4 (CALC)
CO2 SERPL-SCNC: 29 MMOL/L (ref 20–32)
COLOR UR: YELLOW
CREAT SERPL-MCNC: 1 MG/DL (ref 0.6–1)
EOSINOPHIL # BLD AUTO: 142 CELLS/UL (ref 15–500)
EOSINOPHIL NFR BLD AUTO: 2.4 %
ERYTHROCYTE [DISTWIDTH] IN BLOOD BY AUTOMATED COUNT: 13.5 % (ref 11–15)
GFR/BSA.PRED SERPLBLD CYS-BASED-ARV: 61 ML/MIN/1.73M2
GLOBULIN SER CALC-MCNC: 2.2 G/DL (CALC) (ref 1.9–3.7)
GLUCOSE SERPL-MCNC: 98 MG/DL (ref 65–99)
GLUCOSE UR QL STRIP: NEGATIVE
HCT VFR BLD AUTO: 44.2 % (ref 35–45)
HDLC SERPL-MCNC: 84 MG/DL
HGB BLD-MCNC: 14.6 G/DL (ref 11.7–15.5)
HGB UR QL STRIP: NEGATIVE
HYALINE CASTS #/AREA URNS LPF: ABNORMAL /LPF
KETONES UR QL STRIP: NEGATIVE
LDLC SERPL CALC-MCNC: 89 MG/DL (CALC)
LEUKOCYTE ESTERASE UR QL STRIP: ABNORMAL
LYMPHOCYTES # BLD AUTO: 2006 CELLS/UL (ref 850–3900)
LYMPHOCYTES NFR BLD AUTO: 34 %
MAGNESIUM SERPL-MCNC: 2 MG/DL (ref 1.5–2.5)
MCH RBC QN AUTO: 31.4 PG (ref 27–33)
MCHC RBC AUTO-ENTMCNC: 33 G/DL (ref 32–36)
MCV RBC AUTO: 95.1 FL (ref 80–100)
MONOCYTES # BLD AUTO: 466 CELLS/UL (ref 200–950)
MONOCYTES NFR BLD AUTO: 7.9 %
NEUTROPHILS # BLD AUTO: 3239 CELLS/UL (ref 1500–7800)
NEUTROPHILS NFR BLD AUTO: 54.9 %
NITRITE UR QL STRIP: NEGATIVE
NONHDLC SERPL-MCNC: 115 MG/DL (CALC)
PH UR STRIP: 6.5 [PH] (ref 5–8)
PLATELET # BLD AUTO: 173 THOUSAND/UL (ref 140–400)
PMV BLD REES-ECKER: 11.9 FL (ref 7.5–12.5)
POTASSIUM SERPL-SCNC: 4.4 MMOL/L (ref 3.5–5.3)
PROT SERPL-MCNC: 6.3 G/DL (ref 6.1–8.1)
PROT UR QL STRIP: NEGATIVE
RBC # BLD AUTO: 4.65 MILLION/UL (ref 3.8–5.1)
RBC #/AREA URNS HPF: ABNORMAL /HPF
SODIUM SERPL-SCNC: 139 MMOL/L (ref 135–146)
SP GR UR STRIP: 1.01 (ref 1–1.03)
SQUAMOUS #/AREA URNS HPF: ABNORMAL /HPF
TRIGL SERPL-MCNC: 162 MG/DL
TSH SERPL-ACNC: 2.35 MIU/L (ref 0.4–4.5)
WBC # BLD AUTO: 5.9 THOUSAND/UL (ref 3.8–10.8)
WBC #/AREA URNS HPF: ABNORMAL /HPF

## 2024-12-07 ENCOUNTER — RA CDI HCC (OUTPATIENT)
Dept: OTHER | Facility: HOSPITAL | Age: 69
End: 2024-12-07

## 2024-12-17 DIAGNOSIS — I10 ESSENTIAL HYPERTENSION: ICD-10-CM

## 2024-12-18 ENCOUNTER — OFFICE VISIT (OUTPATIENT)
Dept: INTERNAL MEDICINE CLINIC | Facility: CLINIC | Age: 69
End: 2024-12-18
Payer: COMMERCIAL

## 2024-12-18 DIAGNOSIS — G43.109 MIGRAINE WITH AURA AND WITHOUT STATUS MIGRAINOSUS, NOT INTRACTABLE: ICD-10-CM

## 2024-12-18 DIAGNOSIS — Z12.31 ENCOUNTER FOR SCREENING MAMMOGRAM FOR BREAST CANCER: ICD-10-CM

## 2024-12-18 DIAGNOSIS — I10 ESSENTIAL HYPERTENSION: Primary | ICD-10-CM

## 2024-12-18 DIAGNOSIS — E78.2 MIXED HYPERLIPIDEMIA: ICD-10-CM

## 2024-12-18 DIAGNOSIS — Z12.11 SPECIAL SCREENING FOR MALIGNANT NEOPLASMS, COLON: ICD-10-CM

## 2024-12-18 DIAGNOSIS — I35.1 NONRHEUMATIC AORTIC (VALVE) INSUFFICIENCY: ICD-10-CM

## 2024-12-18 DIAGNOSIS — I10 ESSENTIAL HYPERTENSION: ICD-10-CM

## 2024-12-18 PROCEDURE — G2211 COMPLEX E/M VISIT ADD ON: HCPCS | Performed by: INTERNAL MEDICINE

## 2024-12-18 PROCEDURE — 99214 OFFICE O/P EST MOD 30 MIN: CPT | Performed by: INTERNAL MEDICINE

## 2024-12-18 RX ORDER — LABETALOL 100 MG/1
100 TABLET, FILM COATED ORAL 2 TIMES DAILY
Qty: 180 TABLET | Refills: 1 | Status: SHIPPED | OUTPATIENT
Start: 2024-12-18

## 2024-12-18 NOTE — PROGRESS NOTES
Assessment/Plan:           1. Essential hypertension  Comments:  Stable continue current regimen.  Orders:  -     CBC and differential; Future  -     Comprehensive metabolic panel; Future  -     Urinalysis with microscopic; Future  2. Encounter for screening mammogram for breast cancer  -     Mammo screening bilateral w 3d and cad; Future; Expected date: 12/18/2024  3. Migraine with aura and without status migrainosus, not intractable  Comments:  Continue protriptyline.  4. Special screening for malignant neoplasms, colon  -     Ambulatory Referral to Gastroenterology; Future  5. Nonrheumatic aortic (valve) insufficiency  Comments:  Stable continue to follow with cardiology.  6. Mixed hyperlipidemia  Comments:  Continue rosuvastatin.         1. Essential hypertension (Primary)      2. Encounter for screening mammogram for breast cancer      3. Migraine with aura and without status migrainosus, not intractable         No problem-specific Assessment & Plan notes found for this encounter.           Subjective:      Patient ID: Amy Can is a 69 y.o. female.    HPI    The following portions of the patient's history were reviewed and updated as appropriate: She  has a past medical history of Allergic rhinitis, Aortic regurgitation, Chronic kidney disease (2022), Heart murmur (2006), HTN (hypertension), Hyperlipidemia, Hypertension (2006), Nonrheumatic aortic (valve) insufficiency, Sleep apnea, and Vitamin B12 deficiency.  She   Patient Active Problem List    Diagnosis Date Noted    History of solitary pulmonary nodule 08/16/2023    Obesity, morbid (HCC) 12/20/2021    Vitamin D deficiency 12/20/2021    Essential hypertension     Hyperlipidemia     Obesity (BMI 30.0-34.9)     Migraine with aura and without status migrainosus, not intractable 04/07/2021    Aortic dilatation (HCC) 04/07/2021    Nonrheumatic aortic (valve) insufficiency     Sleep apnea     Vitamin B12 deficiency      She  has a past surgical history that  includes Dilation and curettage of uterus; [place] tooth extraction; Bunionectomy; Cholecystectomy; Colonoscopy (07/02/2018); Mammo (historical) (03/21/2017); and pr laparoscopic appendectomy (N/A, 8/2/2021).  Her family history includes Coronary artery disease in her mother; Kidney cancer in her father; No Known Problems in her sister, sister, and sister.  She  reports that she quit smoking about 19 years ago. Her smoking use included cigarettes. She started smoking about 56 years ago. She has a 36.7 pack-year smoking history. She has never used smokeless tobacco. She reports current alcohol use of about 4.0 standard drinks of alcohol per week. She reports that she does not use drugs.  Current Outpatient Medications   Medication Sig Dispense Refill    acetaminophen (TYLENOL) 650 mg CR tablet Take 1 tablet (650 mg total) by mouth every 8 (eight) hours as needed for mild pain 30 tablet 0    Calcium 600 MG tablet Take 2 tablets by mouth daily      cholecalciferol (VITAMIN D3) 1,000 units tablet Take 1,000 Units by mouth daily 5,000 units daily twice a day      cyanocobalamin (VITAMIN B-12) 500 mcg tablet Take 1 tablet by mouth daily      labetalol (NORMODYNE) 100 mg tablet take 1 tablet by mouth twice a day 180 tablet 1    Multiple Vitamins-Minerals (MULTIVITAMIN ADULT) TABS Take 1 tablet by mouth daily      Prolensa 0.07 % SOLN       protriptyline (VIVACTIL) 5 MG tablet Take 1 tablet (5 mg total) by mouth daily at bedtime 90 tablet 1    rosuvastatin (CRESTOR) 5 mg tablet Take 1 tablet (5 mg total) by mouth daily 90 tablet 1    trandolapril (MAVIK) 4 MG tablet Take 1 tablet (4 mg total) by mouth daily 90 tablet 1    verapamil (CALAN) 80 mg tablet take 1 tablet by mouth three times a day 270 tablet 1    calcium citrate-vitamin D (CITRACAL+D) 315-200 MG-UNIT per tablet Take 1 tablet by mouth 2 (two) times a day (Patient not taking: Reported on 7/22/2024)      chlorhexidine (PERIDEX) 0.12 % solution RINSE BY MOUTH TWICE A  DAY (Patient not taking: Reported on 8/9/2023)       No current facility-administered medications for this visit.     Current Outpatient Medications on File Prior to Visit   Medication Sig    acetaminophen (TYLENOL) 650 mg CR tablet Take 1 tablet (650 mg total) by mouth every 8 (eight) hours as needed for mild pain    Calcium 600 MG tablet Take 2 tablets by mouth daily    cholecalciferol (VITAMIN D3) 1,000 units tablet Take 1,000 Units by mouth daily 5,000 units daily twice a day    cyanocobalamin (VITAMIN B-12) 500 mcg tablet Take 1 tablet by mouth daily    labetalol (NORMODYNE) 100 mg tablet take 1 tablet by mouth twice a day    Multiple Vitamins-Minerals (MULTIVITAMIN ADULT) TABS Take 1 tablet by mouth daily    Prolensa 0.07 % SOLN     protriptyline (VIVACTIL) 5 MG tablet Take 1 tablet (5 mg total) by mouth daily at bedtime    rosuvastatin (CRESTOR) 5 mg tablet Take 1 tablet (5 mg total) by mouth daily    trandolapril (MAVIK) 4 MG tablet Take 1 tablet (4 mg total) by mouth daily    verapamil (CALAN) 80 mg tablet take 1 tablet by mouth three times a day    calcium citrate-vitamin D (CITRACAL+D) 315-200 MG-UNIT per tablet Take 1 tablet by mouth 2 (two) times a day (Patient not taking: Reported on 7/22/2024)    chlorhexidine (PERIDEX) 0.12 % solution RINSE BY MOUTH TWICE A DAY (Patient not taking: Reported on 8/9/2023)    [DISCONTINUED] labetalol (NORMODYNE) 100 mg tablet Take 1 tablet (100 mg total) by mouth 2 (two) times a day     No current facility-administered medications on file prior to visit.     There are no discontinued medications.   She is allergic to penicillins..    Review of Systems   Constitutional:  Negative for appetite change, chills, fatigue and fever.   HENT:  Negative for sore throat and trouble swallowing.    Eyes:  Negative for redness.   Respiratory:  Negative for shortness of breath.    Cardiovascular:  Negative for chest pain and palpitations.   Gastrointestinal:  Negative for abdominal  "pain, constipation and diarrhea.   Genitourinary:  Negative for dysuria and hematuria.   Musculoskeletal:  Negative for back pain and neck pain.   Skin:  Negative for rash.   Neurological:  Negative for seizures, weakness and headaches.   Hematological:  Negative for adenopathy.   Psychiatric/Behavioral:  Negative for confusion. The patient is not nervous/anxious.          Objective:      /80 Comment: home  Pulse 76   Temp 98.2 °F (36.8 °C) (Temporal)   Ht 5' 5\" (1.651 m)   Wt 75.8 kg (167 lb)   SpO2 98%   BMI 27.79 kg/m²     Results Reviewed       None            Recent Results (from the past 8 weeks)   Lipid panel    Collection Time: 11/27/24  6:43 AM   Result Value Ref Range    Total Cholesterol 199 <200 mg/dL    HDL 84 > OR = 50 mg/dL    Triglycerides 162 (H) <150 mg/dL    LDL Calculated 89 mg/dL (calc)    Chol HDLC Ratio 2.4 <5.0 (calc)    Non-HDL Cholesterol 115 <130 mg/dL (calc)   Magnesium    Collection Time: 11/27/24  6:43 AM   Result Value Ref Range    Magnesium, Serum 2.0 1.5 - 2.5 mg/dL   Comprehensive metabolic panel    Collection Time: 11/27/24  6:43 AM   Result Value Ref Range    Glucose, Random 98 65 - 99 mg/dL    BUN 24 7 - 25 mg/dL    Creatinine 1.00 0.60 - 1.00 mg/dL    eGFR 61 > OR = 60 mL/min/1.73m2    SL AMB BUN/CREATININE RATIO SEE NOTE: 6 - 22 (calc)    Sodium 139 135 - 146 mmol/L    Potassium 4.4 3.5 - 5.3 mmol/L    Chloride 102 98 - 110 mmol/L    CO2 29 20 - 32 mmol/L    Calcium 9.0 8.6 - 10.4 mg/dL    Protein, Total 6.3 6.1 - 8.1 g/dL    Albumin 4.1 3.6 - 5.1 g/dL    Globulin 2.2 1.9 - 3.7 g/dL (calc)    Albumin/Globulin Ratio 1.9 1.0 - 2.5 (calc)    TOTAL BILIRUBIN 0.6 0.2 - 1.2 mg/dL    Alkaline Phosphatase 67 37 - 153 U/L    AST 13 10 - 35 U/L    ALT 8 6 - 29 U/L   CBC and differential    Collection Time: 11/27/24  6:43 AM   Result Value Ref Range    White Blood Cell Count 5.9 3.8 - 10.8 Thousand/uL    Red Blood Cell Count 4.65 3.80 - 5.10 Million/uL    Hemoglobin 14.6 11.7 " - 15.5 g/dL    HCT 44.2 35.0 - 45.0 %    MCV 95.1 80.0 - 100.0 fL    MCH 31.4 27.0 - 33.0 pg    MCHC 33.0 32.0 - 36.0 g/dL    RDW 13.5 11.0 - 15.0 %    Platelet Count 173 140 - 400 Thousand/uL    SL AMB MPV 11.9 7.5 - 12.5 fL    Neutrophils (Absolute) 3,239 1,500 - 7,800 cells/uL    Lymphocytes (Absolute) 2,006 850 - 3,900 cells/uL    Monocytes (Absolute) 466 200 - 950 cells/uL    Eosinophils (Absolute) 142 15 - 500 cells/uL    Basophils ABS 47 0 - 200 cells/uL    Neutrophils 54.9 %    Lymphocytes 34.0 %    Monocytes 7.9 %    Eosinophils 2.4 %    Basophils PCT 0.8 %   TSH, 3rd generation    Collection Time: 11/27/24  6:43 AM   Result Value Ref Range    TSH 2.35 0.40 - 4.50 mIU/L   UA (URINE) with reflex to Scope    Collection Time: 11/27/24  6:43 AM   Result Value Ref Range    Color UA YELLOW YELLOW    Urine Appearance CLEAR CLEAR    Specific Gravity 1.014 1.001 - 1.035    Ph 6.5 5.0 - 8.0    Glucose, Urine NEGATIVE NEGATIVE    Bilirubin, Urine NEGATIVE NEGATIVE    Ketone, Urine NEGATIVE NEGATIVE    Blood, Urine NEGATIVE NEGATIVE    Protein, Urine NEGATIVE NEGATIVE    Nitrites Urine NEGATIVE NEGATIVE    Leukocyte Esterase 1+ (A) NEGATIVE    SL AMB WBC, URINE NONE SEEN < OR = 5 /HPF    RBC, Urine NONE SEEN < OR = 2 /HPF    Squamous Epithelial Cells NONE SEEN < OR = 5 /HPF    Bacteria, UA NONE SEEN NONE SEEN /HPF    Hyaline Casts NONE SEEN NONE SEEN /LPF    Comment(s)          Physical Exam  Constitutional:       General: She is not in acute distress.     Appearance: Normal appearance.   HENT:      Head: Normocephalic and atraumatic.      Nose: Nose normal.      Mouth/Throat:      Mouth: Mucous membranes are moist.   Eyes:      Extraocular Movements: Extraocular movements intact.      Pupils: Pupils are equal, round, and reactive to light.   Cardiovascular:      Rate and Rhythm: Normal rate and regular rhythm.      Pulses: Normal pulses.      Heart sounds: Murmur heard.      No friction rub.   Pulmonary:      Effort:  Pulmonary effort is normal. No respiratory distress.      Breath sounds: Normal breath sounds. No wheezing.   Abdominal:      General: Abdomen is flat. Bowel sounds are normal. There is no distension.      Palpations: Abdomen is soft. There is no mass.      Tenderness: There is no abdominal tenderness. There is no guarding.   Musculoskeletal:         General: Normal range of motion.      Cervical back: Neck supple.   Neurological:      General: No focal deficit present.      Mental Status: She is alert and oriented to person, place, and time. Mental status is at baseline.      Cranial Nerves: No cranial nerve deficit.   Psychiatric:         Mood and Affect: Mood normal.         Behavior: Behavior normal.

## 2024-12-19 VITALS
OXYGEN SATURATION: 98 % | SYSTOLIC BLOOD PRESSURE: 130 MMHG | WEIGHT: 235 LBS | BODY MASS INDEX: 39.15 KG/M2 | DIASTOLIC BLOOD PRESSURE: 80 MMHG | TEMPERATURE: 98.2 F | HEIGHT: 65 IN | HEART RATE: 76 BPM

## 2024-12-19 DIAGNOSIS — E78.2 MIXED HYPERLIPIDEMIA: ICD-10-CM

## 2024-12-19 RX ORDER — ROSUVASTATIN CALCIUM 5 MG/1
5 TABLET, COATED ORAL DAILY
Qty: 90 TABLET | Refills: 1 | Status: SHIPPED | OUTPATIENT
Start: 2024-12-19

## 2024-12-19 RX ORDER — TRANDOLAPRIL TABLETS 4 MG/1
4 TABLET ORAL DAILY
Qty: 90 TABLET | Refills: 1 | Status: SHIPPED | OUTPATIENT
Start: 2024-12-19

## 2025-01-01 DIAGNOSIS — G43.109 MIGRAINE WITH AURA AND WITHOUT STATUS MIGRAINOSUS, NOT INTRACTABLE: ICD-10-CM

## 2025-01-02 RX ORDER — PROTRIPTYLINE HYDROCHLORIDE 5 MG/1
5 TABLET, FILM COATED ORAL
Qty: 90 TABLET | Refills: 1 | Status: SHIPPED | OUTPATIENT
Start: 2025-01-02

## 2025-03-02 DIAGNOSIS — I10 ESSENTIAL HYPERTENSION: ICD-10-CM

## 2025-03-03 RX ORDER — VERAPAMIL HYDROCHLORIDE 80 MG/1
80 TABLET ORAL 3 TIMES DAILY
Qty: 270 TABLET | Refills: 1 | Status: SHIPPED | OUTPATIENT
Start: 2025-03-03

## 2025-03-05 ENCOUNTER — ANESTHESIA (OUTPATIENT)
Dept: ANESTHESIOLOGY | Facility: HOSPITAL | Age: 70
End: 2025-03-05

## 2025-03-05 ENCOUNTER — PATIENT MESSAGE (OUTPATIENT)
Dept: GASTROENTEROLOGY | Facility: CLINIC | Age: 70
End: 2025-03-05

## 2025-03-05 ENCOUNTER — ANESTHESIA EVENT (OUTPATIENT)
Dept: ANESTHESIOLOGY | Facility: HOSPITAL | Age: 70
End: 2025-03-05

## 2025-03-05 ENCOUNTER — TELEPHONE (OUTPATIENT)
Age: 70
End: 2025-03-05

## 2025-03-05 NOTE — TELEPHONE ENCOUNTER
03/05/25  Screened by: Josie Briones MA    Referring Provider     Pre- Screening:     BMI: 39.11    Has patient been referred for a routine screening Colonoscopy? yes  Is the patient between 45-75 years old? yes      Previous Colonoscopy yes   If yes:    Date: 2021    Facility: [facility]    Reason:         Does the patient want to see a Gastroenterologist prior to their procedure OR are they having any GI symptoms? no    Has the patient been hospitalized or had abdominal surgery in the past 6 months? no    Does the patient use supplemental oxygen? no    Does the patient take Coumadin, Lovenox, Plavix, Elliquis, Xarelto, or other blood thinning medication? no    Has the patient had a stroke, cardiac event, or stent placed in the past year? no      If patient is between 45yrs - 49yrs, please advise patient that we will have to confirm benefits & coverage with their insurance company for a routine screening colonoscopy.

## 2025-03-07 ENCOUNTER — APPOINTMENT (EMERGENCY)
Dept: RADIOLOGY | Facility: HOSPITAL | Age: 70
DRG: 305 | End: 2025-03-07
Payer: COMMERCIAL

## 2025-03-07 ENCOUNTER — HOSPITAL ENCOUNTER (INPATIENT)
Facility: HOSPITAL | Age: 70
LOS: 2 days | Discharge: HOME | DRG: 305 | End: 2025-03-10
Attending: EMERGENCY MEDICINE | Admitting: STUDENT IN AN ORGANIZED HEALTH CARE EDUCATION/TRAINING PROGRAM
Payer: COMMERCIAL

## 2025-03-07 DIAGNOSIS — R29.90 STROKE-LIKE SYMPTOMS: Primary | ICD-10-CM

## 2025-03-07 DIAGNOSIS — I10 ESSENTIAL HYPERTENSION: ICD-10-CM

## 2025-03-07 PROBLEM — G45.9 TIA (TRANSIENT ISCHEMIC ATTACK): Status: ACTIVE | Noted: 2025-03-07

## 2025-03-07 PROBLEM — I16.9 HYPERTENSIVE CRISIS: Status: ACTIVE | Noted: 2025-03-07

## 2025-03-07 LAB
2HR DELTA HS TROPONIN: 0 NG/L
ALBUMIN SERPL BCG-MCNC: 4.1 G/DL (ref 3.5–5)
ALP SERPL-CCNC: 72 U/L (ref 34–104)
ALT SERPL W P-5'-P-CCNC: 8 U/L (ref 7–52)
ANION GAP SERPL CALCULATED.3IONS-SCNC: 9 MMOL/L (ref 4–13)
APTT PPP: 25 SECONDS (ref 23–34)
AST SERPL W P-5'-P-CCNC: 27 U/L (ref 13–39)
BASOPHILS # BLD AUTO: 0.03 THOUSANDS/ÂΜL (ref 0–0.1)
BASOPHILS NFR BLD AUTO: 1 % (ref 0–1)
BILIRUB SERPL-MCNC: 0.67 MG/DL (ref 0.2–1)
BUN SERPL-MCNC: 21 MG/DL (ref 5–25)
CALCIUM SERPL-MCNC: 9.3 MG/DL (ref 8.4–10.2)
CARDIAC TROPONIN I PNL SERPL HS: 6 NG/L (ref ?–50)
CARDIAC TROPONIN I PNL SERPL HS: 6 NG/L (ref ?–50)
CHLORIDE SERPL-SCNC: 104 MMOL/L (ref 96–108)
CHOLEST SERPL-MCNC: 180 MG/DL (ref ?–200)
CO2 SERPL-SCNC: 23 MMOL/L (ref 21–32)
CREAT SERPL-MCNC: 1 MG/DL (ref 0.6–1.3)
EOSINOPHIL # BLD AUTO: 0.11 THOUSAND/ÂΜL (ref 0–0.61)
EOSINOPHIL NFR BLD AUTO: 2 % (ref 0–6)
ERYTHROCYTE [DISTWIDTH] IN BLOOD BY AUTOMATED COUNT: 14.6 % (ref 11.6–15.1)
EST. AVERAGE GLUCOSE BLD GHB EST-MCNC: 114 MG/DL
GFR SERPL CREATININE-BSD FRML MDRD: 57 ML/MIN/1.73SQ M
GLUCOSE SERPL-MCNC: 113 MG/DL (ref 65–140)
HBA1C MFR BLD: 5.6 %
HCT VFR BLD AUTO: 44.3 % (ref 34.8–46.1)
HDLC SERPL-MCNC: 71 MG/DL
HGB BLD-MCNC: 14.7 G/DL (ref 11.5–15.4)
IMM GRANULOCYTES # BLD AUTO: 0.02 THOUSAND/UL (ref 0–0.2)
IMM GRANULOCYTES NFR BLD AUTO: 0 % (ref 0–2)
INR PPP: 0.94 (ref 0.85–1.19)
LDLC SERPL CALC-MCNC: 80 MG/DL (ref 0–100)
LYMPHOCYTES # BLD AUTO: 1.89 THOUSANDS/ÂΜL (ref 0.6–4.47)
LYMPHOCYTES NFR BLD AUTO: 33 % (ref 14–44)
MCH RBC QN AUTO: 30.6 PG (ref 26.8–34.3)
MCHC RBC AUTO-ENTMCNC: 33.2 G/DL (ref 31.4–37.4)
MCV RBC AUTO: 92 FL (ref 82–98)
MONOCYTES # BLD AUTO: 0.33 THOUSAND/ÂΜL (ref 0.17–1.22)
MONOCYTES NFR BLD AUTO: 6 % (ref 4–12)
NEUTROPHILS # BLD AUTO: 3.28 THOUSANDS/ÂΜL (ref 1.85–7.62)
NEUTS SEG NFR BLD AUTO: 58 % (ref 43–75)
NRBC BLD AUTO-RTO: 0 /100 WBCS
PLATELET # BLD AUTO: 185 THOUSANDS/UL (ref 149–390)
PLATELET # BLD AUTO: 189 THOUSANDS/UL (ref 149–390)
PMV BLD AUTO: 11.8 FL (ref 8.9–12.7)
PMV BLD AUTO: 12 FL (ref 8.9–12.7)
POTASSIUM SERPL-SCNC: 5.2 MMOL/L (ref 3.5–5.3)
PROT SERPL-MCNC: 6.7 G/DL (ref 6.4–8.4)
PROTHROMBIN TIME: 12.8 SECONDS (ref 12.3–15)
RBC # BLD AUTO: 4.8 MILLION/UL (ref 3.81–5.12)
SODIUM SERPL-SCNC: 136 MMOL/L (ref 135–147)
TRIGL SERPL-MCNC: 147 MG/DL (ref ?–150)
TSH SERPL DL<=0.05 MIU/L-ACNC: 1.88 UIU/ML (ref 0.45–4.5)
WBC # BLD AUTO: 5.66 THOUSAND/UL (ref 4.31–10.16)

## 2025-03-07 PROCEDURE — 84443 ASSAY THYROID STIM HORMONE: CPT

## 2025-03-07 PROCEDURE — 80053 COMPREHEN METABOLIC PANEL: CPT

## 2025-03-07 PROCEDURE — NC001 PR NO CHARGE: Performed by: STUDENT IN AN ORGANIZED HEALTH CARE EDUCATION/TRAINING PROGRAM

## 2025-03-07 PROCEDURE — 70498 CT ANGIOGRAPHY NECK: CPT

## 2025-03-07 PROCEDURE — 70496 CT ANGIOGRAPHY HEAD: CPT

## 2025-03-07 PROCEDURE — 71046 X-RAY EXAM CHEST 2 VIEWS: CPT

## 2025-03-07 PROCEDURE — 84484 ASSAY OF TROPONIN QUANT: CPT

## 2025-03-07 PROCEDURE — 99285 EMERGENCY DEPT VISIT HI MDM: CPT

## 2025-03-07 PROCEDURE — 99205 OFFICE O/P NEW HI 60 MIN: CPT | Performed by: PSYCHIATRY & NEUROLOGY

## 2025-03-07 PROCEDURE — 85730 THROMBOPLASTIN TIME PARTIAL: CPT

## 2025-03-07 PROCEDURE — 85025 COMPLETE CBC W/AUTO DIFF WBC: CPT

## 2025-03-07 PROCEDURE — 99285 EMERGENCY DEPT VISIT HI MDM: CPT | Performed by: EMERGENCY MEDICINE

## 2025-03-07 PROCEDURE — 93005 ELECTROCARDIOGRAM TRACING: CPT

## 2025-03-07 PROCEDURE — 85049 AUTOMATED PLATELET COUNT: CPT

## 2025-03-07 PROCEDURE — 80061 LIPID PANEL: CPT

## 2025-03-07 PROCEDURE — 85610 PROTHROMBIN TIME: CPT

## 2025-03-07 PROCEDURE — 36415 COLL VENOUS BLD VENIPUNCTURE: CPT

## 2025-03-07 PROCEDURE — 83036 HEMOGLOBIN GLYCOSYLATED A1C: CPT

## 2025-03-07 RX ORDER — PRAVASTATIN SODIUM 40 MG
40 TABLET ORAL
Status: DISCONTINUED | OUTPATIENT
Start: 2025-03-07 | End: 2025-03-07

## 2025-03-07 RX ORDER — HYDRALAZINE HYDROCHLORIDE 20 MG/ML
5 INJECTION INTRAMUSCULAR; INTRAVENOUS EVERY 4 HOURS PRN
Status: DISCONTINUED | OUTPATIENT
Start: 2025-03-07 | End: 2025-03-10 | Stop reason: HOSPADM

## 2025-03-07 RX ORDER — LORAZEPAM 2 MG/ML
0.5 INJECTION INTRAMUSCULAR ONCE AS NEEDED
Status: COMPLETED | OUTPATIENT
Start: 2025-03-07 | End: 2025-03-09

## 2025-03-07 RX ORDER — AMITRIPTYLINE HYDROCHLORIDE 10 MG/1
10 TABLET ORAL
Status: DISCONTINUED | OUTPATIENT
Start: 2025-03-07 | End: 2025-03-10 | Stop reason: HOSPADM

## 2025-03-07 RX ORDER — LABETALOL HYDROCHLORIDE 5 MG/ML
10 INJECTION, SOLUTION INTRAVENOUS EVERY 4 HOURS PRN
Status: DISCONTINUED | OUTPATIENT
Start: 2025-03-07 | End: 2025-03-10 | Stop reason: HOSPADM

## 2025-03-07 RX ORDER — LABETALOL 100 MG/1
100 TABLET, FILM COATED ORAL 2 TIMES DAILY
Status: DISCONTINUED | OUTPATIENT
Start: 2025-03-07 | End: 2025-03-10 | Stop reason: HOSPADM

## 2025-03-07 RX ORDER — LISINOPRIL 5 MG/1
5 TABLET ORAL DAILY
Status: DISCONTINUED | OUTPATIENT
Start: 2025-03-08 | End: 2025-03-09

## 2025-03-07 RX ORDER — SENNOSIDES 8.6 MG
1 TABLET ORAL
Status: DISCONTINUED | OUTPATIENT
Start: 2025-03-07 | End: 2025-03-10 | Stop reason: HOSPADM

## 2025-03-07 RX ORDER — LABETALOL HYDROCHLORIDE 5 MG/ML
10 INJECTION, SOLUTION INTRAVENOUS ONCE
Status: COMPLETED | OUTPATIENT
Start: 2025-03-07 | End: 2025-03-07

## 2025-03-07 RX ORDER — ASPIRIN 81 MG/1
81 TABLET ORAL DAILY
Status: DISCONTINUED | OUTPATIENT
Start: 2025-03-08 | End: 2025-03-10 | Stop reason: HOSPADM

## 2025-03-07 RX ORDER — ATORVASTATIN CALCIUM 40 MG/1
40 TABLET, FILM COATED ORAL
Status: DISCONTINUED | OUTPATIENT
Start: 2025-03-07 | End: 2025-03-10 | Stop reason: HOSPADM

## 2025-03-07 RX ORDER — ACETAMINOPHEN 325 MG/1
650 TABLET ORAL EVERY 6 HOURS PRN
Status: DISCONTINUED | OUTPATIENT
Start: 2025-03-07 | End: 2025-03-10 | Stop reason: HOSPADM

## 2025-03-07 RX ORDER — AMLODIPINE BESYLATE 5 MG/1
5 TABLET ORAL DAILY
Status: DISCONTINUED | OUTPATIENT
Start: 2025-03-08 | End: 2025-03-09

## 2025-03-07 RX ORDER — ENOXAPARIN SODIUM 100 MG/ML
40 INJECTION SUBCUTANEOUS DAILY
Status: DISCONTINUED | OUTPATIENT
Start: 2025-03-07 | End: 2025-03-10 | Stop reason: HOSPADM

## 2025-03-07 RX ADMIN — ENOXAPARIN SODIUM 40 MG: 40 INJECTION SUBCUTANEOUS at 17:06

## 2025-03-07 RX ADMIN — IOHEXOL 85 ML: 350 INJECTION, SOLUTION INTRAVENOUS at 11:24

## 2025-03-07 RX ADMIN — LABETALOL HYDROCHLORIDE 10 MG: 5 INJECTION, SOLUTION INTRAVENOUS at 14:13

## 2025-03-07 RX ADMIN — ATORVASTATIN CALCIUM 40 MG: 40 TABLET, FILM COATED ORAL at 17:06

## 2025-03-07 RX ADMIN — LABETALOL HYDROCHLORIDE 100 MG: 100 TABLET, FILM COATED ORAL at 17:06

## 2025-03-07 RX ADMIN — ACETAMINOPHEN 650 MG: 325 TABLET, FILM COATED ORAL at 19:53

## 2025-03-07 RX ADMIN — AMITRIPTYLINE HYDROCHLORIDE 10 MG: 10 TABLET ORAL at 21:02

## 2025-03-07 NOTE — PROGRESS NOTES
INTERNAL MEDICINE RESIDENCY SENIOR ADMISSION NOTE     Name: Amy Can   Age & Sex: 69 y.o. female   MRN: [id number]  Unit/Bed#: ED 21   Encounter: [id number]  Primary Care Provider: Ross Griffith MD    Admit to team: SOD Team B     Patient seen and examined. Reviewed H&P per Dr. Dominic Mclain. Agree with the assessment and plan with any exception/addition as noted below:    Patient is a 69-year-old female with a past medical history of hypertension, hyperlipidemia, moderate aortic regurgitation, and migraines who presented with sudden onset left-sided numbness and weakness that began around 8 AM this morning.  She also reported sharp chest pain at that time.  She was given aspirin en route and symptoms resolved after roughly 10-15 minutes.  In the ED, vitals were significant for BP 160s-200s/90s-100s.  Labs were significant for troponin 6-6 but otherwise unremarkable.  Chest x-ray without acute abnormalities.  CTA H/N showing no acute intracranial abnormality or large vessel occlusion, but did show ectatic proximal ascending aorta measuring 4.3 cm in diameter, which is just slightly larger than imaging in August 2023 (4.1 cm).  She is being placed under observation with SOD B for further management of blood pressure.    Patient seen and evaluated at the bedside.  She feels that she is at her baseline and has no acute concerns.  Reports that her symptoms have completely resolved.  Denies headache, lightheadedness, dizziness, vision changes, chest pain, shortness of breath.  Neuro exam without focal deficits.  Normal muscle strength and sensation to light touch.    Suspect patient's symptoms this morning were secondary to hypertensive urgency/emergency.  Low suspicion for stroke at this time.    Plan:  Neurology consulted, appreciate recommendations  Start amlodipine 5 mg daily now  Continue PTA labetalol 100 mg twice daily  Start hospital equivalent lisinopril 5 mg daily tomorrow as patient took it this  morning  Hold home verapamil  Labetalol 10 mg IV every 4 hours as needed for SBP >180.  First-line agent  Hydralazine 5 mg every 4 hours as needed for SBP >180.  Second-line agent  No specific BP goals per neurology but plan to slowly decrease blood pressure throughout the day  Monitor on telemetry given initial concern for strokelike symptoms  Follow-up 4-hour troponin  Aspirin 81 mg and Lipitor 40 mg daily  Check lipid panel  Defer to neurology if patient needs further head imaging    Active Problems:    Hypertensive crisis      Code Status: Level 3 - DNAR and DNI  Admission Status: OBSERVATION  Disposition: Patient requires Med/Surg with Telemetry  Expected Length of Stay: 24-48 hours

## 2025-03-07 NOTE — ED PROVIDER NOTES
Time reflects when diagnosis was documented in both MDM as applicable and the Disposition within this note       Time User Action Codes Description Comment    3/7/2025 12:55 PM Julia Pham Add [R29.90] Stroke-like symptoms           ED Disposition       ED Disposition   Admit    Condition   Stable    Date/Time   Fri Mar 7, 2025 12:54 PM    Comment                  Assessment & Plan       Medical Decision Making  Amount and/or Complexity of Data Reviewed  Labs: ordered. Decision-making details documented in ED Course.  Radiology: ordered and independent interpretation performed. Decision-making details documented in ED Course.    Risk  Prescription drug management.  Decision regarding hospitalization.        ED Course as of 03/07/25 1425   Fri Mar 07, 2025   0855 Patient seen and evaluated by me  Ddx: No concern for stroke given symptoms have completely resolved.  However, story is concerning for TIA.  Otherwise differential could include ACS, otherwise arrhythmia, doubt dissection given symptoms completely resolved and there is no neurovascular asymmetry on exam.  Workup and plan: CBC, CMP, troponin, TSH, HbA1c, coags, EKG, CTA head and neck.   0900 EKG done at 0900 interpreted by me   rate 83  rhythm normal sinus   axis left deviation  QRS complexes are normal  Intervals are normal  ST segments showing no ischemic changes      1004 CBC and differential  WNL   1005 Comprehensive metabolic panel  WNL   1005 hs TnI 0hr: 6   1025 TSH 3RD GENERATON: 1.878   1055 Coags normal   1222 CTA head and neck with and without contrast  IMPRESSION:  1.  No acute intracranial abnormality.  2.  No large vessel occlusion in the head or neck.  3.  Ectatic proximal ascending aorta measuring 43 mm in diameter. Follow-up CTA chest within 1 year is suggested.  4.  Right sphenoid sinus mucosal disease as above, correlate clinically for signs and symptoms of acute sinusitis.     1223 XR chest 2 views  Normal on my interpretation   1223  Pending neurology evaluation   1423 Patient admitted to a [facility] with neurology consult.       Medications   iohexol (OMNIPAQUE) 350 MG/ML injection (MULTI-DOSE) 85 mL (85 mL Intravenous Given 3/7/25 1124)   labetalol (NORMODYNE) injection 10 mg (10 mg Intravenous Given 3/7/25 1413)       ED Risk Strat Scores                            SBIRT 20yo+      Flowsheet Row Most Recent Value   Initial Alcohol Screen: US AUDIT-C     1. How often do you have a drink containing alcohol? 0 Filed at: 03/07/2025 0910   2. How many drinks containing alcohol do you have on a typical day you are drinking?  0 Filed at: 03/07/2025 0910   3a. Male UNDER 65: How often do you have five or more drinks on one occasion? 0 Filed at: 03/07/2025 0910   3b. FEMALE Any Age, or MALE 65+: How often do you have 4 or more drinks on one occassion? 0 Filed at: 03/07/2025 0910   Audit-C Score 0 Filed at: 03/07/2025 0910   JAIDA: How many times in the past year have you...    Used an illegal drug or used a prescription medication for non-medical reasons? Never Filed at: 03/07/2025 0910                            History of Present Illness       Chief Complaint   Patient presents with    Dizziness     Pt brought in by EMS had an episode L sided numbness and tingling, dizziness, and chest pain at 8am. Pt sx have resolved. Pt took aspirin at home       Past Medical History:   Diagnosis Date    Allergic rhinitis     Aortic regurgitation     Chronic kidney disease 2022    Heart murmur 2006    HTN (hypertension)     Hyperlipidemia     Hypertension 2006    Nonrheumatic aortic (valve) insufficiency     Sleep apnea     Vitamin B12 deficiency       Past Surgical History:   Procedure Laterality Date    BUNIONECTOMY      CHOLECYSTECTOMY      COLONOSCOPY  07/02/2018    DILATION AND CURETTAGE OF UTERUS      MAMMO (HISTORICAL)  03/21/2017 5/18/2018 and 3/21/2017     MO LAPAROSCOPIC APPENDECTOMY N/A 8/2/2021    Procedure: APPENDECTOMY LAPAROSCOPIC;  Surgeon: Percy  MD Jh;  Location: [facility];  Service: Colorectal    WISDOM TOOTH EXTRACTION        Family History   Problem Relation Age of Onset    Coronary artery disease Mother     Kidney cancer Father     No Known Problems Sister     No Known Problems Sister     No Known Problems Sister       Social History     Tobacco Use    Smoking status: Former     Current packs/day: 0.00     Average packs/day: 1 pack/day for 36.7 years (36.7 ttl pk-yrs)     Types: Cigarettes     Start date: 1968     Quit date: 2005     Years since quittin.1    Smokeless tobacco: Never   Vaping Use    Vaping status: Never Used   Substance Use Topics    Alcohol use: Yes     Alcohol/week: 4.0 standard drinks of alcohol     Types: 4 Standard drinks or equivalent per week    Drug use: Never      E-Cigarette/Vaping    E-Cigarette Use Never User       E-Cigarette/Vaping Substances    Nicotine No     THC No     CBD No     Flavoring No     Other No     Unknown No       I have reviewed and agree with the history as documented.     Patient is a 69-year-old female, past medical history significant for hypertension, hyperlipidemia, CKD, presenting today for evaluation of strokelike symptoms.  Patient states that this morning around 8 AM she was getting ready to go grocery shopping when she had acute that left-sided upper and lower extremity numbness.  She states that she had a difficult time walking at that time as well, difficult to say if she was having a hard time feeling the floor or if she felt weak in her extremities as well.  She reports associated substernal chest pain which she describes as sharp in nature.  She denies any radiation around to her back, to her extremities, or up into her jaw.  She also reports associated lightheadedness, but no room spinning sensation.  She denies any associated shortness of breath.  Patient states that she sat down and her  called 911.  Due to the chest pain, they are directed to administer 4 baby  aspirin which patient took.  She states that her symptoms resolved within 15 minutes.  She currently feels asymptomatic and back to her baseline.  She has never had symptoms like this in the past.  She has no history of heart disease or strokes.          Review of Systems   Constitutional:  Negative for chills, fatigue and fever.   HENT:  Negative for congestion.    Respiratory:  Negative for cough, chest tightness and shortness of breath.    Cardiovascular:  Negative for chest pain.   Gastrointestinal:  Negative for abdominal pain, diarrhea, nausea and vomiting.   Genitourinary:  Negative for difficulty urinating.   Skin:  Negative for color change.   Neurological:  Positive for dizziness, weakness and numbness. Negative for syncope and headaches.           Objective       ED Triage Vitals   Temperature Pulse Blood Pressure Respirations SpO2 Patient Position - Orthostatic VS   03/07/25 0913 03/07/25 0857 03/07/25 0857 03/07/25 0857 03/07/25 0857 --   98.2 °F (36.8 °C) 77 (!) 169/106 18 97 %       Temp Source Heart Rate Source BP Location FiO2 (%) Pain Score    03/07/25 0913 03/07/25 0857 03/07/25 0857 -- --    Oral Monitor Left arm        Vitals      Date and Time Temp Pulse SpO2 Resp BP Pain Score FACES Pain Rating User   03/07/25 1300 -- 74 97 % 20 207/101 -- -- MI   03/07/25 1102 -- 76 95 % 21 191/92 -- --    03/07/25 0913 98.2 °F (36.8 °C) -- -- -- -- -- --    03/07/25 0857 -- 77 97 % 18 169/106 -- --             Physical Exam  Vitals and nursing note reviewed.   Constitutional:       General: She is not in acute distress.     Appearance: Normal appearance. She is not ill-appearing or toxic-appearing.   HENT:      Head: Normocephalic and atraumatic.      Mouth/Throat:      Mouth: Mucous membranes are moist.   Eyes:      General: No scleral icterus.     Extraocular Movements: Extraocular movements intact.   Cardiovascular:      Rate and Rhythm: Normal rate and regular rhythm.      Pulses: Normal pulses.       Heart sounds: Normal heart sounds. No murmur heard.  Pulmonary:      Effort: Pulmonary effort is normal. No respiratory distress.      Breath sounds: Normal breath sounds. No wheezing or rhonchi.   Abdominal:      General: Abdomen is flat. There is no distension.      Palpations: Abdomen is soft.      Tenderness: There is no abdominal tenderness.   Musculoskeletal:         General: Normal range of motion.      Cervical back: Normal range of motion.   Skin:     General: Skin is warm.      Capillary Refill: Capillary refill takes less than 2 seconds.   Neurological:      General: No focal deficit present.      Mental Status: She is alert and oriented to person, place, and time.      GCS: GCS eye subscore is 4. GCS verbal subscore is 5. GCS motor subscore is 6.      Cranial Nerves: Cranial nerves 2-12 are intact. No cranial nerve deficit, dysarthria or facial asymmetry.      Sensory: Sensation is intact. No sensory deficit.      Motor: Motor function is intact. No weakness.      Coordination: Romberg sign negative. Finger-Nose-Finger Test and Heel to Shin Test normal.      Gait: Gait is intact. Gait normal.   Psychiatric:         Mood and Affect: Mood normal.         Behavior: Behavior normal.         Results Reviewed       Procedure Component Value Units Date/Time    HS Troponin I 2hr [[id number]]  (Normal) Collected: 03/07/25 1119    Lab Status: Final result Specimen: Blood from Arm, Left Updated: 03/07/25 1151     hs TnI 2hr 6 ng/L      Delta 2hr hsTnI 0 ng/L     Hemoglobin A1C [[id number]] Collected: 03/07/25 0916    Lab Status: Final result Specimen: Blood from Arm, Left Updated: 03/07/25 1052     Hemoglobin A1C 5.6 %       mg/dl     APTT [[id number]]  (Normal) Resulted: 03/07/25 1041    Lab Status: Final result Specimen: Blood from Arm, Left Updated: 03/07/25 1041     PTT 25 seconds     Protime-INR [[id number]]  (Normal) Resulted: 03/07/25 1041    Lab Status: Final result Specimen: Blood from Arm, Left  Updated: 03/07/25 1041     Protime 12.8 seconds      INR 0.94    Narrative:      INR Therapeutic Range    Indication                                             INR Range      Atrial Fibrillation                                               2.0-3.0  Hypercoagulable State                                    2.0.2.3  Left Ventricular Asist Device                            2.0-3.0  Mechanical Heart Valve                                  -    Aortic(with afib, MI, embolism, HF, LA enlargement,    and/or coagulopathy)                                     2.0-3.0 (2.5-3.5)     Mitral                                                             2.5-3.5  Prosthetic/Bioprosthetic Heart Valve               2.0-3.0  Venous thromboembolism (VTE: VT, PE        2.0-3.0    TSH, 3rd generation with Free T4 reflex [[id number]]  (Normal) Collected: 03/07/25 0916    Lab Status: Final result Specimen: Blood from Arm, Left Updated: 03/07/25 1009     TSH 3RD GENERATON 1.878 uIU/mL     HS Troponin 0hr (reflex protocol) [[id number]]  (Normal) Collected: 03/07/25 0916    Lab Status: Final result Specimen: Blood from Arm, Left Updated: 03/07/25 0952     hs TnI 0hr 6 ng/L     Comprehensive metabolic panel [[id number]] Collected: 03/07/25 0916    Lab Status: Final result Specimen: Blood from Arm, Left Updated: 03/07/25 0951     Sodium 136 mmol/L      Potassium 5.2 mmol/L      Chloride 104 mmol/L      CO2 23 mmol/L      ANION GAP 9 mmol/L      BUN 21 mg/dL      Creatinine 1.00 mg/dL      Glucose 113 mg/dL      Calcium 9.3 mg/dL      AST 27 U/L      ALT 8 U/L      Alkaline Phosphatase 72 U/L      Total Protein 6.7 g/dL      Albumin 4.1 g/dL      Total Bilirubin 0.67 mg/dL      eGFR 57 ml/min/1.73sq m     Narrative:      National Kid[employer] Disease Foundation guidelines for Chronic Kidney Disease (CKD):     Stage 1 with normal or high GFR (GFR > 90 mL/min/1.73 square meters)    Stage 2 Mild CKD (GFR = 60-89 mL/min/1.73 square meters)    Stage 3A Moderate  CKD (GFR = 45-59 mL/min/1.73 square meters)    Stage 3B Moderate CKD (GFR = 30-44 mL/min/1.73 square meters)    Stage 4 Severe CKD (GFR = 15-29 mL/min/1.73 square meters)    Stage 5 End Stage CKD (GFR <15 mL/min/1.73 square meters)  Note: GFR calculation is accurate only with a steady state creatinine    CBC and differential [[id number]] Collected: 03/07/25 0916    Lab Status: Final result Specimen: Blood from Arm, Left Updated: 03/07/25 0930     WBC 5.66 Thousand/uL      RBC 4.80 Million/uL      Hemoglobin 14.7 g/dL      Hematocrit 44.3 %      MCV 92 fL      MCH 30.6 pg      MCHC 33.2 g/dL      RDW 14.6 %      MPV 12.0 fL      Platelets 189 Thousands/uL      nRBC 0 /100 WBCs      Segmented % 58 %      Immature Grans % 0 %      Lymphocytes % 33 %      Monocytes % 6 %      Eosinophils Relative 2 %      Basophils Relative 1 %      Absolute Neutrophils 3.28 Thousands/µL      Absolute Immature Grans 0.02 Thousand/uL      Absolute Lymphocytes 1.89 Thousands/µL      Absolute Monocytes 0.33 Thousand/µL      Eosinophils Absolute 0.11 Thousand/µL      Basophils Absolute 0.03 Thousands/µL             XR chest 2 views   ED Interpretation by Julia Pham MD (03/07 1223)   No acute cardiopulmonary disease on my interpretation      Final Interpretation by Lemuel Bonner MD (03/07 1247)      No radiographic evidence of acute intrathoracic process.            Workstation performed: [id number]         CTA head and neck with and without contrast   Final Interpretation by Derek Gómez MD (03/07 1153)   1.  No acute intracranial abnormality.   2.  No large vessel occlusion in the head or neck.   3.  Ectatic proximal ascending aorta measuring 43 mm in diameter. Follow-up CTA chest within 1 year is suggested.   4.  Right sphenoid sinus mucosal disease as above, correlate clinically for signs and symptoms of acute sinusitis.            Workstation performed: [id number]             Procedures    ED Medication and  Procedure Management   Prior to Admission Medications   Prescriptions Last Dose Informant Patient Reported? Taking?   Calcium 600 MG tablet  Self Yes No   Sig: Take 2 tablets by mouth daily   Multiple Vitamins-Minerals (MULTIVITAMIN ADULT) TABS  Self Yes No   Sig: Take 1 tablet by mouth daily   Prolensa 0.07 % SOLN  Self Yes No   acetaminophen (TYLENOL) 650 mg CR tablet  Self No No   Sig: Take 1 tablet (650 mg total) by mouth every 8 (eight) hours as needed for mild pain   calcium citrate-vitamin D (CITRACAL+D) 315-200 MG-UNIT per tablet  Self Yes No   Sig: Take 1 tablet by mouth 2 (two) times a day   Patient not taking: Reported on 7/22/2024   chlorhexidine (PERIDEX) 0.12 % solution  Self Yes No   Sig: RINSE BY MOUTH TWICE A DAY   Patient not taking: Reported on 8/9/2023   cholecalciferol (VITAMIN D3) 1,000 units tablet  Self Yes No   Sig: Take 1,000 Units by mouth daily 5,000 units daily twice a day   cyanocobalamin (VITAMIN B-12) 500 mcg tablet  Self Yes No   Sig: Take 1 tablet by mouth daily   labetalol (NORMODYNE) 100 mg tablet  Self No No   Sig: take 1 tablet by mouth twice a day   protriptyline (VIVACTIL) 5 MG tablet   No No   Sig: take 1 tablet by mouth at bedtime   rosuvastatin (CRESTOR) 5 mg tablet   No No   Sig: Take 1 tablet (5 mg total) by mouth daily   trandolapril (MAVIK) 4 MG tablet   No No   Sig: take 1 tablet by mouth once daily   verapamil (CALAN) 80 mg tablet   No No   Sig: take 1 tablet by mouth three times a day      Facility-Administered Medications: None     Patient's Medications   Discharge Prescriptions    No medications on file     No discharge procedures on file.  ED SEPSIS DOCUMENTATION   Time reflects when diagnosis was documented in both MDM as applicable and the Disposition within this note       Time User Action Codes Description Comment    3/7/2025 12:55 PM Julia Pham Add [R29.90] Stroke-like symptoms                  Julia Pham MD  03/07/25 9796

## 2025-03-07 NOTE — ASSESSMENT & PLAN NOTE
A 15-minute episode of left-sided weakness as well as numbness/tingling in face, arm, leg that self resolved.  Neuro examination shows no residual deficits.  will currently manage as TIA pending neurology recommendations     Plan:  - Neuro consult, appreciate recommendations on obtaining MRI and optimizing medical therapy  - Aspirin 81 daily  - Atorvastatin 40 mg daily  - Follow-up on results of lipid panel  - Monitor on telemetry

## 2025-03-07 NOTE — ASSESSMENT & PLAN NOTE
Amy Can is a 69 y.o. right handed female with PMHx of Migraines with aura, HTN, HLD, and aortic valve insufficiency, who presents afte an episode of sudden onset sharp chest pain, followed by L sided numbness, tingling, and lightheadedness. The chest pain resolved in 15 minutes after sitting. The numbness lasted 20 minutes and then gradually resolved over an additional 25 min. She denies any seizure like activity, headaches, visual changes, hearing changes, photophobia, phonophobia, nausea, aphasia, confusion, or dizziness. Blood pressures 169/106, 191/92, and 207/101 in the ED. CTA Brain negative for acute intracranial abnormalities or large vessel occlusion in head or neck. Currently asymptomatic without any focal deficits, and back at baseline. ABCD2 score of 3.     LDL 80  A1c 5.6  CTAHN unremarkable    Symptoms more likely due to elevated blood pressure especially given preceding chest pain, but given full left sided numbness concern for TIA. Unusual to have positive sensations such as tingling with a tia/stroke however.    Plan:  -MRI Brain wo  -TTE  -aspirin 81 mg daily  -lipitor 40 mg qHS  -normotension  -telemetry  -pt/ot  -remainder per primary

## 2025-03-07 NOTE — ASSESSMENT & PLAN NOTE
Ectatic proximal ascending aorta measuring 43 mm in diameter    Plan:  - Follow-up CTA chest with a 1 year

## 2025-03-07 NOTE — ASSESSMENT & PLAN NOTE
Acute episodic migraine for which patient takes home protriptyline 5 mg nightly    Plan:  - Amitriptyline 10 mg at night inpatient due to formulary availability

## 2025-03-07 NOTE — ED ATTENDING ATTESTATION
3/7/2025  I, Anu Torre MD, saw and evaluated the patient. I have discussed the patient with the resident/non-physician practitioner and agree with the resident's/non-physician practitioner's findings, Plan of Care, and MDM as documented in the resident's/non-physician practitioner's note, except where noted. All available labs and Radiology studies were reviewed.  I was present for key portions of any procedure(s) performed by the resident/non-physician practitioner and I was immediately available to provide assistance.       At this point I agree with the current assessment done in the Emergency Department.  I have conducted an independent evaluation of this patient a history and physical is as follows:    ED Course         Critical Care Time  Procedures    68 yo female with hx of htn, hld, had sudden onset of left sided numbness and weakness at 8am today. Pt also with hard time with gait.  Pt also with cp at that time.  Pt given asa. Pt symptoms resolved after 15 minutes. No sob, no n/v/diaphoresis. No hx of stroke or tia.  Vss, afebrile, lungs cta, rrr, abdomen soft nontender, no neuro deficits, nih ss 0. Not tnk candidate due to resolved symptoms.  Cta head and neck, cardiac workup.

## 2025-03-07 NOTE — CONSULTS
Consultation - Neurology   Name: Amy Can 69 y.o. female I MRN: [id number]  Unit/Bed#: ED 21 I Date of Admission: 3/7/2025   Date of Service: 3/7/2025 I Hospital Day: 0   Inpatient consult to Neurology  Consult performed by: Cleo Holm MD  Consult ordered by: Woody Gillespie DO        Physician Requesting Evaluation: Anu Torre MD   Reason for Evaluation / Principal Problem: Stroke like symptoms    Assessment & Plan  Stroke-like symptoms  Amy Can is a 69 y.o. right handed female with PMHx of Migraines with aura, HTN, HLD, and aortic valve insufficiency, who presents afte an episode of sudden onset sharp chest pain, followed by L sided numbness, tingling, and lightheadedness. The chest pain resolved in 15 minutes after sitting. The numbness lasted 20 minutes and then gradually resolved over an additional 25 min. She denies any seizure like activity, headaches, visual changes, hearing changes, photophobia, phonophobia, nausea, aphasia, confusion, or dizziness. Blood pressures 169/106, 191/92, and 207/101 in the ED. CTA Brain negative for acute intracranial abnormalities or large vessel occlusion in head or neck. Currently asymptomatic without any focal deficits, and back at baseline. ABCD2 score of 3.     LDL 80  A1c 5.6  CTAHN unremarkable    Symptoms more likely due to elevated blood pressure especially given preceding chest pain, but given full left sided numbness concern for TIA. Unusual to have positive sensations such as tingling with a tia/stroke however.    Plan:  -MRI Brain wo  -TTE  -aspirin 81 mg daily  -lipitor 40 mg qHS  -normotension  -telemetry  -pt/ot  -remainder per primary  I have discussed the above management plan in detail with the primary service.   Neurology service will follow.    Recommendations for outpatient neurological follow up have yet to be determined.    History of Present Illness   Amy Can is a 69 y.o. right handed female  with PMHx of Migraines with aura,  "HTN, HLD, and aortic valve insufficiency, who presents with chest pain, L sided numbness and tingling, and lightheadedness. She was walking through her house this morning doing ADLs as she had sudden onset sharp chest pain, seconds later L sided numbness and lightheadedness. She immediately sat down, and the chest pain resolved within 15 minutes of sitting, the numbness lasted for 20 minutes then gradually resolved over another 20 minutes. She couldn't feel her own L arm, L leg, or her  touching her on that side during the episode. She feels she has returned to baseline and endorses that her  didn't notice anything different about her during the episode than what was stated above. She denies any seizure like activity, headaches, visual changes, hearing changes, photophobia, phonophobia, nausea, aphasia, confusion, or dizziness. She has denies any episodes similar to this previously, denies any prior TIA or strokes. Denies any trauma, head strikes or new stressors.     Her BP ranges are usually in the 130/80s on \"good days\" and on bad days 160/90. She reports taking all of her meds. She has a hx of migraines with aura and feels this episode is different than her usual of flashing lights followed by bilateral frontal migraine. She has 2 migraines per month now after starting nortriptyline in July.     More remotely and not experienced today, she reports 3-4 brief episodes a week throughout her life of feeling \"weird beats\" in her heart and lightheadedness lasting minutes>hours. She bought an [employer] finger testing kit that told her she was having afib during one of these episodes but has not experienced this today.    Review of Systems   Cardiovascular:  Positive for chest pain.   Neurological:  Positive for light-headedness and numbness. Negative for dizziness, tremors, seizures, syncope, facial asymmetry, speech difficulty, weakness and headaches.        Medical History Review: I have reviewed the " patient's PMH, PSH, Social History, Family History, Meds, and Allergies     Objective :  Temp:  [98.2 °F (36.8 °C)] 98.2 °F (36.8 °C)  HR:  [76-77] 76  BP: (169-191)/() 191/92  Resp:  [18-21] 21  SpO2:  [95 %-97 %] 95 %  O2 Device: None (Room air)    Physical Exam  Constitutional:       General: She is not in acute distress.     Appearance: Normal appearance. She is not ill-appearing.   Eyes:      General: Lids are normal.      Extraocular Movements: Extraocular movements intact.      Pupils: Pupils are equal, round, and reactive to light.   Neurological:      Deep Tendon Reflexes:      Reflex Scores:       Tricep reflexes are 2+ on the right side and 2+ on the left side.       Bicep reflexes are 2+ on the right side and 2+ on the left side.       Brachioradialis reflexes are 2+ on the right side and 2+ on the left side.       Patellar reflexes are 2+ on the right side and 2+ on the left side.       Achilles reflexes are 2+ on the right side and 2+ on the left side.  Psychiatric:         Speech: Speech normal.     Neurological Exam  Mental Status   Speech is normal. Language is fluent with no aphasia. Attention and concentration are normal. Fund of knowledge is appropriate for level of education.    Cranial Nerves  CN II: Visual acuity is normal. Visual fields full to confrontation.  CN III, IV, VI: Extraocular movements intact bilaterally. Normal lids and orbits bilaterally. Pupils equal round and reactive to light bilaterally.  CN V: Facial sensation is normal.  CN VII: Full and symmetric facial movement.  CN VIII: Hearing is normal.  CN IX, X: Palate elevates symmetrically  CN XI: Shoulder shrug strength is normal.  CN XII: Tongue midline without atrophy or fasciculations.    Motor  Normal muscle bulk throughout. Normal muscle tone.                                               Right                     Left   Shoulder abduction               5                          5  Elbow flexion                          5                          5  Elbow extension                    5                          5  Hip flexion                              5                          5  Knee flexion                           5                          5  Knee extension                      5                          5  Plantarflexion                         5                          5  Dorsiflexion                            5                          5    Sensory  Light touch is normal in upper and lower extremities. Pinprick is normal in upper and lower extremities.     Reflexes                                            Right                      Left  Brachioradialis                    2+                         2+  Biceps                                 2+                         2+  Triceps                                2+                         2+  Patellar                                2+                         2+  Achilles                                2+                         2+    Right pathological reflexes: Finn's absent.  Left pathological reflexes: Finn's absent.    Coordination  Right: Finger-to-nose normal. Heel-to-shin normal.Left: Finger-to-nose normal. Heel-to-shin normal.        Lab Results: I have reviewed the following results:I have personally reviewed pertinent reports.    Recent Labs     03/07/25  0916   WBC 5.66   HGB 14.7   HCT 44.3      SODIUM 136   K 5.2      CO2 23   BUN 21   CREATININE 1.00   GLUC 113     Imaging Results Review: I reviewed radiology reports from this admission including: CT head.      VTE Prophylaxis: VTE covered by:  enoxaparin, Subcutaneous    and Sequential compression device (Venodyne)       Yves Rojas MS3  [facility] [employer]     Cleo Holm MD, PGY-2

## 2025-03-07 NOTE — H&P
H&P - Internal Medicine   Name: Amy Can 69 y.o. female I MRN: [id number]  Unit/Bed#: ED 21 I Date of Admission: 3/7/2025   Date of Service: 3/7/2025 I Hospital Day: 0     Assessment & Plan  Hypertensive crisis  Initial pressure of 169/106 recorded at 9 AM.  The pressure continued to stay elevated, crossing 180 systolic, and was over 200 systolic when we evaluated her (207/101). No findings of end organ damage as there are currently no symptoms of signs of damage. Labs including trops, creatinine, LFTs, CXR, are all WNL. At this point would call this hypertensive emergency that resolved as her chest pain and weakness/numbness resolved, and is now hypertensive urgency.     Plan:  - Continue home regimen of labetalol 100 mg twice daily  - Amlodipine 5 mg daily, first dose today   - Hold home veramapil  - Begin lisinopril 5 mg, first dose tomorrow morning  - hydralazine 5 mg every 4 hours IV as needed  - Labetalol 10 mg every 4 hours IV as needed  - q2h BP checks   TIA (transient ischemic attack)  A 15-minute episode of left-sided weakness as well as numbness/tingling in face, arm, leg that self resolved.  Neuro examination shows no residual deficits.  will currently manage as TIA pending neurology recommendations     Plan:  - Neuro consult, appreciate recommendations on obtaining MRI and optimizing medical therapy  - Aspirin 81 daily  - Atorvastatin 40 mg daily  - Follow-up on results of lipid panel  - Monitor on telemetry  Migraine with aura and without status migrainosus, not intractable  Acute episodic migraine for which patient takes home protriptyline 5 mg nightly    Plan:  - Amitriptyline 10 mg at night inpatient due to formulary availability  Aortic dilatation (HCC)  Ectatic proximal ascending aorta measuring 43 mm in diameter    Plan:  - Follow-up CTA chest with a 1 year    Code Status: Level 3 - DNAR and DNI  Admission Status: INPATIENT   Disposition: Patient requires Med Surg with Telemetry    Admit to  team: SOD TEAM B    History of Present Illness   Eve Can is a 69-year-old woman with past medical history of aortic regurgitation, hypertension, hyperlipidemia, and episodic migraine presents with left-sided weakness and chest pain.    She first noticed symptoms of weakness on her left side this morning at around 8 AM.  She noticed numbness and weakness on the entire left side, including her face arm and leg.  She had trouble getting to her car in order to come to the hospital.  She describes no symptoms of blurry vision, difficulty with speech production, lightheadedness, nausea, vomiting, shortness of breath.  At the same time she also noticed chest pain.  Chest pain self resolved in minutes.  She has not experienced pain like this before.  She was not physically exerting herself at the time.  The pain was not positional.  When she arrived in the ED about 15 minutes later, both the chest pain and left-sided weakness and numbness/tingling had resolved.    Upon admission to the ED, initial vitals were significant for a blood pressure of 169/106 recorded at 9 AM.  The pressure continued to this day elevated, crossing 180 systolic, and was over 200 systolic when we evaluated her.  Labs are notable for negative tropes, A1c 5.6, normal creatinine, and normal LFTs.  Chest x-ray showed no acute process CTA head and neck with and without contrast showed no acute intracranial abnormality and no large vessel occlusion.  Incidental finding of ectatic proximal ascending aorta is noted.  And also showing findings of the right sphenoid sinus mucosal disease.  Neurology was consulted and patient was admitted for hypertensive emergency/TIA.    Objective :  Temp:  [98.2 °F (36.8 °C)] 98.2 °F (36.8 °C)  HR:  [74-77] 74  BP: (169-207)/() 207/101  Resp:  [18-21] 20  SpO2:  [95 %-97 %] 97 %  O2 Device: None (Room air)    Intake & Output:  I/O       None          Weights:        There is no height or weight on file to  calculate BMI.  Weight (last 2 days)       None          Physical Exam  Constitutional:       Appearance: Normal appearance. She is not ill-appearing or toxic-appearing.   HENT:      Head: Normocephalic and atraumatic.      Right Ear: External ear normal.      Left Ear: External ear normal.      Nose: Nose normal.      Mouth/Throat:      Mouth: Mucous membranes are moist.      Pharynx: Oropharynx is clear.   Eyes:      General: No scleral icterus.        Right eye: No discharge.         Left eye: No discharge.      Extraocular Movements: Extraocular movements intact.      Conjunctiva/sclera: Conjunctivae normal.      Pupils: Pupils are equal, round, and reactive to light.   Cardiovascular:      Rate and Rhythm: Normal rate and regular rhythm.      Pulses: Normal pulses.      Heart sounds: Normal heart sounds. No murmur heard.     No friction rub. No gallop.   Pulmonary:      Effort: Pulmonary effort is normal. No respiratory distress.      Breath sounds: Normal breath sounds. No stridor. No wheezing, rhonchi or rales.   Abdominal:      General: Abdomen is flat.      Palpations: Abdomen is soft.   Musculoskeletal:      Right lower leg: No edema.      Left lower leg: No edema.   Skin:     General: Skin is warm and dry.      Coloration: Skin is not jaundiced.      Findings: No bruising.   Neurological:      General: No focal deficit present.      Mental Status: She is alert and oriented to person, place, and time. Mental status is at baseline.      Cranial Nerves: No cranial nerve deficit.      Sensory: No sensory deficit.      Motor: No weakness (5/5 strength testing in bilateral arm flexion and extension, , hip flexion, and dorsiflexion/plantar flexion).         Lab Results: I have reviewed the following results:  Recent Labs     03/07/25  0916 03/07/25  1041 03/07/25  1119   WBC 5.66  --   --    HGB 14.7  --   --    HCT 44.3  --   --      --   --    SODIUM 136  --   --    K 5.2  --   --      --   --  "   CO2 23  --   --    BUN 21  --   --    CREATININE 1.00  --   --    GLUC 113  --   --    AST 27  --   --    ALT 8  --   --    ALB 4.1  --   --    TBILI 0.67  --   --    ALKPHOS 72  --   --    PTT  --  25  --    INR  --  0.94  --    HSTNI0 6  --   --    HSTNI2  --   --  6     Micro:  No results found for: \"BLOODCX\", \"URINECX\", \"WOUNDCULT\", \"SPUTUMCULTUR\"          Currently Ordered Meds:   Current Facility-Administered Medications:     acetaminophen (TYLENOL) tablet 650 mg, Q6H PRN    amitriptyline (ELAVIL) tablet 10 mg, HS    amLODIPine (NORVASC) tablet 5 mg, Daily    enoxaparin (LOVENOX) subcutaneous injection 40 mg, Daily    hydrALAZINE (APRESOLINE) injection 5 mg, Q4H PRN    labetalol (NORMODYNE) injection 10 mg, Q4H PRN    labetalol (NORMODYNE) tablet 100 mg, BID    [START ON 3/8/2025] lisinopril (ZESTRIL) tablet 5 mg, Daily    pravastatin (PRAVACHOL) tablet 40 mg, Daily With Dinner    senna (SENOKOT) tablet 8.6 mg, HS PRN  VTE Pharmacologic Prophylaxis: VTE covered by:  enoxaparin, Subcutaneous     VTE Mechanical Prophylaxis: sequential compression device    Administrative Statements     Portions of the record may have been created with voice recognition software.  Occasional wrong word or \"sound a like\" substitutions may have occurred due to the inherent limitations of voice recognition software.  Read the chart carefully and recognize, using context, where substitutions have occurred.  ==  Dominic Mclain MD  [facility]  Internal Medicine Residency PGY-I  "

## 2025-03-07 NOTE — ASSESSMENT & PLAN NOTE
Initial pressure of 169/106 recorded at 9 AM.  The pressure continued to stay elevated, crossing 180 systolic, and was over 200 systolic when we evaluated her (207/101). No findings of end organ damage as there are currently no symptoms of signs of damage. Labs including trops, creatinine, LFTs, CXR, are all WNL. At this point would call this hypertensive emergency that resolved as her chest pain and weakness/numbness resolved, and is now hypertensive urgency.     Plan:  - Continue home regimen of labetalol 100 mg twice daily  - Amlodipine 5 mg daily, first dose today   - Hold home veramapil  - Begin lisinopril 5 mg, first dose tomorrow morning  - hydralazine 5 mg every 4 hours IV as needed  - Labetalol 10 mg every 4 hours IV as needed  - q2h BP checks

## 2025-03-08 LAB
ALBUMIN SERPL BCG-MCNC: 3.7 G/DL (ref 3.5–5)
ALP SERPL-CCNC: 67 U/L (ref 34–104)
ALT SERPL W P-5'-P-CCNC: 8 U/L (ref 7–52)
ANION GAP SERPL CALCULATED.3IONS-SCNC: 9 MMOL/L (ref 4–13)
AST SERPL W P-5'-P-CCNC: 15 U/L (ref 13–39)
ATRIAL RATE: 83 BPM
BILIRUB SERPL-MCNC: 0.59 MG/DL (ref 0.2–1)
BUN SERPL-MCNC: 19 MG/DL (ref 5–25)
CALCIUM SERPL-MCNC: 8.9 MG/DL (ref 8.4–10.2)
CHLORIDE SERPL-SCNC: 108 MMOL/L (ref 96–108)
CO2 SERPL-SCNC: 25 MMOL/L (ref 21–32)
CREAT SERPL-MCNC: 0.96 MG/DL (ref 0.6–1.3)
ERYTHROCYTE [DISTWIDTH] IN BLOOD BY AUTOMATED COUNT: 14.6 % (ref 11.6–15.1)
GFR SERPL CREATININE-BSD FRML MDRD: 60 ML/MIN/1.73SQ M
GLUCOSE P FAST SERPL-MCNC: 98 MG/DL (ref 65–99)
GLUCOSE SERPL-MCNC: 98 MG/DL (ref 65–140)
HCT VFR BLD AUTO: 44.4 % (ref 34.8–46.1)
HGB BLD-MCNC: 14.6 G/DL (ref 11.5–15.4)
MAGNESIUM SERPL-MCNC: 1.9 MG/DL (ref 1.9–2.7)
MCH RBC QN AUTO: 30.5 PG (ref 26.8–34.3)
MCHC RBC AUTO-ENTMCNC: 32.9 G/DL (ref 31.4–37.4)
MCV RBC AUTO: 93 FL (ref 82–98)
P AXIS: 25 DEGREES
PHOSPHATE SERPL-MCNC: 4 MG/DL (ref 2.3–4.1)
PLATELET # BLD AUTO: 167 THOUSANDS/UL (ref 149–390)
PMV BLD AUTO: 11.4 FL (ref 8.9–12.7)
POTASSIUM SERPL-SCNC: 3.9 MMOL/L (ref 3.5–5.3)
PR INTERVAL: 158 MS
PROT SERPL-MCNC: 6 G/DL (ref 6.4–8.4)
QRS AXIS: -31 DEGREES
QRSD INTERVAL: 80 MS
QT INTERVAL: 358 MS
QTC INTERVAL: 420 MS
RBC # BLD AUTO: 4.79 MILLION/UL (ref 3.81–5.12)
SODIUM SERPL-SCNC: 142 MMOL/L (ref 135–147)
T WAVE AXIS: -16 DEGREES
VENTRICULAR RATE: 83 BPM
WBC # BLD AUTO: 5.09 THOUSAND/UL (ref 4.31–10.16)

## 2025-03-08 PROCEDURE — 85027 COMPLETE CBC AUTOMATED: CPT

## 2025-03-08 PROCEDURE — 83735 ASSAY OF MAGNESIUM: CPT

## 2025-03-08 PROCEDURE — 99232 SBSQ HOSP IP/OBS MODERATE 35: CPT | Performed by: STUDENT IN AN ORGANIZED HEALTH CARE EDUCATION/TRAINING PROGRAM

## 2025-03-08 PROCEDURE — 97162 PT EVAL MOD COMPLEX 30 MIN: CPT

## 2025-03-08 PROCEDURE — 97166 OT EVAL MOD COMPLEX 45 MIN: CPT

## 2025-03-08 PROCEDURE — 80053 COMPREHEN METABOLIC PANEL: CPT

## 2025-03-08 PROCEDURE — 84100 ASSAY OF PHOSPHORUS: CPT | Performed by: STUDENT IN AN ORGANIZED HEALTH CARE EDUCATION/TRAINING PROGRAM

## 2025-03-08 PROCEDURE — 93010 ELECTROCARDIOGRAM REPORT: CPT | Performed by: INTERNAL MEDICINE

## 2025-03-08 RX ADMIN — ACETAMINOPHEN 650 MG: 325 TABLET, FILM COATED ORAL at 19:05

## 2025-03-08 RX ADMIN — ATORVASTATIN CALCIUM 40 MG: 40 TABLET, FILM COATED ORAL at 17:03

## 2025-03-08 RX ADMIN — ASPIRIN 81 MG: 81 TABLET, COATED ORAL at 08:09

## 2025-03-08 RX ADMIN — LABETALOL HYDROCHLORIDE 100 MG: 100 TABLET, FILM COATED ORAL at 17:06

## 2025-03-08 RX ADMIN — ENOXAPARIN SODIUM 40 MG: 40 INJECTION SUBCUTANEOUS at 08:09

## 2025-03-08 RX ADMIN — LISINOPRIL 5 MG: 5 TABLET ORAL at 08:09

## 2025-03-08 RX ADMIN — AMITRIPTYLINE HYDROCHLORIDE 10 MG: 10 TABLET ORAL at 21:08

## 2025-03-08 RX ADMIN — AMLODIPINE BESYLATE 5 MG: 5 TABLET ORAL at 08:09

## 2025-03-08 RX ADMIN — LABETALOL HYDROCHLORIDE 100 MG: 100 TABLET, FILM COATED ORAL at 08:09

## 2025-03-08 NOTE — QUICK NOTE
RN reached out at 0130 as she noted 14 beats of VT on tele. Patient asymptomatic, /68, HR 73, O2 93% on RA. I reviewed the printout, 14 beats NSVT. Noticed patient is on TCA, checked tele, QTC ~450. Patient is currently on labetalol BID. Will check Mg. Continue to monitor on tele and f/u on echo.

## 2025-03-08 NOTE — ASSESSMENT & PLAN NOTE
A 15-minute episode of left-sided weakness as well as numbness/tingling in face, arm, leg that self resolved.  Neuro examination shows no residual deficits.  will currently manage as TIA pending neurology recommendations   A1c 5.6,    Plan:  - Neuro consulted, pending echo and MRI brain  - Aspirin 81 daily  - Atorvastatin 40 mg daily  - Monitor on telemetry

## 2025-03-08 NOTE — PROGRESS NOTES
Progress Note - Internal Medicine   Name: Amy Can 69 y.o. female I MRN: [id number]  [facility] Date of Admission: 3/7/2025   Date of Service: 3/8/2025 I Hospital Day: 0     Assessment & Plan  Stroke-like symptoms  Initial pressure of 169/106 recorded at 9 AM.  The pressure continued to stay elevated, crossing 180 systolic, and was over 200 systolic when we evaluated her (207/101). No findings of end organ damage as there are currently no symptoms of signs of damage. Labs including trops, creatinine, LFTs, CXR, are all WNL. At this point would call this hypertensive emergency that resolved as her chest pain and weakness/numbness resolved, and is now hypertensive urgency.   BP improved following morning.    Plan:  - Continue home regimen of labetalol 100 mg twice daily  - Amlodipine 5 mg daily  - Hold home veramapil  - Begin lisinopril 5 mg  - hydralazine 5 mg every 4 hours IV as needed  - Labetalol 10 mg every 4 hours IV as needed  - q2h BP checks   TIA (transient ischemic attack)  A 15-minute episode of left-sided weakness as well as numbness/tingling in face, arm, leg that self resolved.  Neuro examination shows no residual deficits.  will currently manage as TIA pending neurology recommendations   A1c 5.6,    Plan:  - Neuro consulted, pending echo and MRI brain  - Aspirin 81 daily  - Atorvastatin 40 mg daily  - Monitor on telemetry  Migraine with aura and without status migrainosus, not intractable  Acute episodic migraine for which patient takes home protriptyline 5 mg nightly    Plan:  - Amitriptyline 10 mg at night inpatient due to formulary availability  Aortic dilatation (HCC)  Ectatic proximal ascending aorta measuring 43 mm in diameter    Plan:  - Follow-up CTA chest with a 1 year    Disposition: pending MRI brain and echo, anticipate discharge in 24-48hr     Team: SOD TEAM B    Subjective   Patient seen and examined. No acute events overnight.  Patient feels well today with no acute  complaints.  Denies any further episodes of numbness/weakness.    Objective :  Temp:  [97.4 °F (36.3 °C)-97.9 °F (36.6 °C)] 97.9 °F (36.6 °C)  HR:  [] 91  BP: (130-207)/() 164/97  Resp:  [16-21] 16  SpO2:  [93 %-97 %] 96 %  O2 Device: None (Room air)    I/O       None          Weights:        There is no height or weight on file to calculate BMI.  Weight (last 2 days)       None            Physical Exam  Vitals reviewed.   Constitutional:       General: She is not in acute distress.     Appearance: She is not ill-appearing.   HENT:      Head: Normocephalic and atraumatic.      Right Ear: External ear normal.      Left Ear: External ear normal.      Nose: Nose normal.      Mouth/Throat:      Mouth: Mucous membranes are moist.   Eyes:      Conjunctiva/sclera: Conjunctivae normal.   Cardiovascular:      Rate and Rhythm: Normal rate and regular rhythm.      Heart sounds: Normal heart sounds.   Pulmonary:      Effort: Pulmonary effort is normal. No respiratory distress.      Breath sounds: Normal breath sounds.   Abdominal:      General: Bowel sounds are normal. There is no distension.      Palpations: Abdomen is soft.      Tenderness: There is no abdominal tenderness. There is no guarding.   Musculoskeletal:         General: No swelling.      Right lower leg: No edema.      Left lower leg: No edema.   Skin:     General: Skin is warm and dry.   Neurological:      General: No focal deficit present.      Mental Status: She is alert and oriented to person, place, and time. Mental status is at baseline.      Cranial Nerves: No cranial nerve deficit.      Sensory: No sensory deficit.      Motor: No weakness.   Psychiatric:         Mood and Affect: Mood normal.         Behavior: Behavior normal.           Lab Results: I have reviewed the following results:  Recent Labs     03/07/25  0916 03/07/25  1041 03/07/25  1119 03/07/25  1448 03/08/25  0554   WBC 5.66  --   --   --  5.09   HGB 14.7  --   --   --  14.6   HCT  44.3  --   --   --  44.4     --   --    < > 167   SODIUM 136  --   --   --  142   K 5.2  --   --   --  3.9     --   --   --  108   CO2 23  --   --   --  25   BUN 21  --   --   --  19   CREATININE 1.00  --   --   --  0.96   GLUC 113  --   --   --  98   MG  --   --   --   --  1.9   PHOS  --   --   --   --  4.0   AST 27  --   --   --  15   ALT 8  --   --   --  8   ALB 4.1  --   --   --  3.7   TBILI 0.67  --   --   --  0.59   ALKPHOS 72  --   --   --  67   PTT  --  25  --   --   --    INR  --  0.94  --   --   --    HSTNI0 6  --   --   --   --    HSTNI2  --   --  6  --   --     < > = values in this interval not displayed.       Imaging Results Review: No pertinent imaging studies reviewed.  Other Study Results Review: Other studies reviewed include: telemetry    Currently Ordered Meds:   Current Facility-Administered Medications:     acetaminophen (TYLENOL) tablet 650 mg, Q6H PRN    amitriptyline (ELAVIL) tablet 10 mg, HS    amLODIPine (NORVASC) tablet 5 mg, Daily    aspirin (ECOTRIN LOW STRENGTH) EC tablet 81 mg, Daily    atorvastatin (LIPITOR) tablet 40 mg, Daily With Dinner    enoxaparin (LOVENOX) subcutaneous injection 40 mg, Daily    hydrALAZINE (APRESOLINE) injection 5 mg, Q4H PRN    labetalol (NORMODYNE) injection 10 mg, Q4H PRN    labetalol (NORMODYNE) tablet 100 mg, BID    lisinopril (ZESTRIL) tablet 5 mg, Daily    LORazepam (ATIVAN) injection 0.5 mg, Once PRN    senna (SENOKOT) tablet 8.6 mg, HS PRN  VTE Pharmacologic Prophylaxis: VTE covered by:  enoxaparin, Subcutaneous, 40 mg at 03/08/25 0809      VTE Mechanical Prophylaxis: sequential compression device    Administrative Statements     Portions of the record may have been created with voice recognition software.

## 2025-03-08 NOTE — UTILIZATION REVIEW
Initial Clinical Review  Obs 3/7@1404 UPGRADED TO INPT 3/8@1126 D/T STROKE-LIKE SYMPTOMS WITH NEED FOR MRI, ECHO AND TELE D/T VT.  Admission: Date/Time/Statement:   Admission Orders (From admission, onward)       Ordered        03/08/25 1126  INPATIENT ADMISSION  Once            03/07/25 1404  Place in Observation  Once                                                       INPATIENT ADMISSION     Standing Status:   Standing     Number of Occurrences:   1     Level of Care:   Med Surg [16]     Estimated length of stay:   More than 2 Midnights     Certification:   I certify that inpatient services are medically necessary for this patient for a duration of greater than two midnights. See H&P and MD Progress Notes for additional information about the patient's course of treatment.     ED Arrival Information       Expected   -    Arrival   3/7/2025 08:51    Acuity   Urgent              Means of arrival   Ambulance    Escorted by   [facility]    Service   [facility]    Admission type   Emergency              Arrival complaint   left sided numbness             Chief Complaint   Patient presents with    Dizziness     Pt brought in by EMS had an episode L sided numbness and tingling, dizziness, and chest pain at 8am. Pt sx have resolved. Pt took aspirin at home       Initial Presentation: 69 y.o. female to ED by ems admitted observation d/t hypertensive crisis.  past medical history of aortic regurgitation, hypertension, hyperlipidemia, and episodic migraine presents with left-sided weakness and chest pain.  /106 recorded at 9 AM.  The pressure continued to stay elevated, crossing 180 systolic, and was over 200 systolic when we evaluated her (207/101). No findings of end organ damage as there are currently no symptoms of signs of damage.15-minute episode of left-sided weakness as well as numbness/tingling in face, arm, leg that self resolved.  trops, creatinine, LFTs, CXR, are all WNL.Tele.asa,  Labetolol, amlodipine, lisinopril.hydralazine. Neuro consult.        Date: 3/8   Day 2: Upgraded to Inpt. 14 beats of VT on tele. feels well today with no acute complaints. Denies any further episodes of numbness/weakness. Patient asymptomatic, BP improved following morning.   /68, HR 73, O2 93% on RA.Echo & MRI ordered. Tele.asa, Labetolol, amlodipine, lisinopril.hydralazine.     Date: 3/9  Day 3: Has surpassed a 2nd midnight with active treatments and services.Neurology consulted, MRI brain negative for acute stroke. Suspect symptoms likely due to elevated BP, SBP in the 200s on arrival vs palpitations. SBP improved over the last 24 hours to 140-160s. Pt reports longstanding hx of palpitations sometimes associated with dizziness. Reports last loop recorder monitoring around 20-30 years ago. Currently on telemetry, noted to have 8secs of NSVT. Pt is on labetalol 100 mg bid and verapamil 80 mg tid. Will continue this for now as she has been on both these medications since 2015, follows with cardiology team outpatient. Will increase lisinopril dose and monitor closely over the next 24 hours. Plan for zio patch outpatient with close outpatient f/u with cardiology for further workup of palpitations. F/u on echo results.Telemetry.        ED Treatment-Medication Administration from 03/07/2025 0851 to 03/07/2025 1557         Date/Time Order Dose Route Action              03/07/2025 1413 labetalol (NORMODYNE) injection 10 mg 10 mg Intravenous Given            TELE  PT/OT  I&O  SCD  OOB        Scheduled Medications:  amitriptyline, 10 mg, Oral, HS  amLODIPine, 5 mg, Oral, Daily  aspirin, 81 mg, Oral, Daily  atorvastatin, 40 mg, Oral, Daily With Dinner  enoxaparin, 40 mg, Subcutaneous, Daily  labetalol, 100 mg, Oral, BID  lisinopril, 5 mg, Oral, Daily      Continuous IV Infusions:     PRN Meds:  acetaminophen, 650 mg, Oral, Q6H PRN 3/7 X 1, 3/8 X 1  hydrALAZINE, 5 mg, Intravenous, Q4H PRN  labetalol, 10 mg,  Intravenous, Q4H PRN  LORazepam, 0.5 mg, Intravenous, Once PRN 3/9 X 1  senna, 1 tablet, Oral, HS PRN      ED Triage Vitals   Temperature Pulse Respirations Blood Pressure SpO2 Pain Score   03/07/25 0913 03/07/25 0857 03/07/25 0857 03/07/25 0857 03/07/25 0857 03/07/25 1609   98.2 °F (36.8 °C) 77 18 (!) 169/106 97 % No Pain     Weight (last 2 days)       Date/Time Weight    03/09/25 1000 107 (235)            Vital Signs (last 3 days)       Date/Time Temp Pulse Resp BP MAP (mmHg) SpO2 O2 Device Patient Position - Orthostatic VS Lashawn Coma Scale Score Pain    03/09/25 11:19:43 -- 109 -- 160/94 116 91 % -- -- -- --    03/09/25 1000 -- 90 -- 162/90 -- -- -- -- -- --    03/09/25 08:03:43 97.7 °F (36.5 °C) 103 -- 162/90 114 93 % None (Room air) Lying -- No Pain    03/09/25 0800 -- -- -- -- -- -- -- -- 15 --    03/08/25 22:16:50 98.6 °F (37 °C) -- -- 109/70 83 -- -- Lying -- --    03/08/25 1905 -- -- -- -- -- -- -- -- -- 10 - Worst Possible Pain    03/08/25 19:04:01 -- 90 -- 144/90 108 95 % None (Room air) Sitting -- --    03/08/25 17:05:29 -- 98 -- 145/94 111 95 % -- -- -- --    03/08/25 1600 -- -- -- -- -- -- -- -- 15 --    03/08/25 15:28:10 97.9 °F (36.6 °C) 84 16 169/96 120 96 % None (Room air) Sitting -- --    03/08/25 0907 -- -- -- -- -- -- -- -- -- No Pain    03/08/25 0906 -- -- -- -- -- -- -- -- -- No Pain    03/08/25 08:59:47 -- 91 -- 164/97 119 96 % -- -- -- --    03/08/25 08:57:46 -- 88 -- 161/96 118 96 % -- -- -- --    03/08/25 0800 -- -- -- -- -- -- -- -- 15 No Pain    03/08/25 07:39:08 97.9 °F (36.6 °C) 104 -- 166/95 119 95 % -- -- -- --    03/08/25 01:30:08 -- 78 -- 150/68 95 93 % -- -- -- --    03/07/25 21:50:35 97.7 °F (36.5 °C) 85 16 130/60 83 93 % -- -- -- --    03/07/25 16:09:25 97.4 °F (36.3 °C) 95 -- 169/119 136 96 % -- -- 15 No Pain    03/07/25 1300 -- 74 20 207/101 145 97 % -- -- -- --    03/07/25 1102 -- 76 21 191/92 132 95 % None (Room air) -- -- --    03/07/25 0913 98.2 °F (36.8 °C) -- -- -- --  -- -- -- -- --    03/07/25 0902 -- -- -- -- -- -- -- -- 15 --    03/07/25 0857 -- 77 18 169/106 -- 97 % None (Room air) -- -- --              Pertinent Labs/Diagnostic Test Results:   Radiology:  MRI brain wo contrast   Final Interpretation by Sergio Brizuela MD (03/09 0634)      No acute infarct, significant mass effect or midline shift.      Workstation performed: [id number]         XR chest 2 views   ED Interpretation by Julia Pham MD (03/07 1223)   No acute cardiopulmonary disease on my interpretation      Final Interpretation by Lemuel Bonner MD (03/07 1247)      No radiographic evidence of acute intrathoracic process.            Workstation performed: [id number]         CTA head and neck with and without contrast   Final Interpretation by Derek Gómez MD (03/07 1153)   1.  No acute intracranial abnormality.   2.  No large vessel occlusion in the head or neck.   3.  Ectatic proximal ascending aorta measuring 43 mm in diameter. Follow-up CTA chest within 1 year is suggested.   4.  Right sphenoid sinus mucosal disease as above, correlate clinically for signs and symptoms of acute sinusitis.            Workstation performed: [id number]                 Results from last 7 days   Lab Units 03/09/25  0519 03/08/25  0554 03/07/25  1448 03/07/25  0916   WBC Thousand/uL 6.32 5.09  --  5.66   HEMOGLOBIN g/dL 14.5 14.6  --  14.7   HEMATOCRIT % 44.4 44.4  --  44.3   PLATELETS Thousands/uL 170 167 185 189   TOTAL NEUT ABS Thousands/µL 3.77  --   --  3.28         Results from last 7 days   Lab Units 03/09/25  0519 03/08/25  0554 03/07/25  0916   SODIUM mmol/L 142 142 136   POTASSIUM mmol/L 4.0 3.9 5.2   CHLORIDE mmol/L 109* 108 104   CO2 mmol/L 24 25 23   ANION GAP mmol/L 9 9 9   BUN mg/dL 18 19 21   CREATININE mg/dL 0.94 0.96 1.00   EGFR ml/min/1.73sq m 61 60 57   CALCIUM mg/dL 8.8 8.9 9.3   MAGNESIUM mg/dL  --  1.9  --    PHOSPHORUS mg/dL  --  4.0  --      Results from last 7 days   Lab Units  03/08/25  0554 03/07/25  0916   AST U/L 15 27   ALT U/L 8 8   ALK PHOS U/L 67 72   TOTAL PROTEIN g/dL 6.0* 6.7   ALBUMIN g/dL 3.7 4.1   TOTAL BILIRUBIN mg/dL 0.59 0.67         Results from last 7 days   Lab Units 03/09/25  0519 03/08/25  0554 03/07/25  0916   GLUCOSE RANDOM mg/dL 98 98 113         Results from last 7 days   Lab Units 03/07/25  0916   HEMOGLOBIN A1C % 5.6   EAG mg/dl 114     Results from last 7 days   Lab Units 03/07/25  1119 03/07/25  0916   HS TNI 0HR ng/L  --  6   HS TNI 2HR ng/L 6  --    HSTNI D2 ng/L 0  --          Results from last 7 days   Lab Units 03/07/25  1041   PROTIME seconds 12.8   INR  0.94   PTT seconds 25     Results from last 7 days   Lab Units 03/07/25  0916   TSH 3RD GENERATON uIU/mL 1.878       Past Medical History:   Diagnosis Date    Allergic rhinitis     Aortic regurgitation     Chronic kidney disease 2022    Heart murmur 2006    HTN (hypertension)     Hyperlipidemia     Hypertension 2006    Nonrheumatic aortic (valve) insufficiency     Sleep apnea     Vitamin B12 deficiency      Present on Admission:   Migraine with aura and without status migrainosus, not intractable   Aortic dilatation (HCC)      Admitting Diagnosis: Left sided numbness [R20.0]  Stroke-like symptoms [R29.90]  Age/Sex: 69 y.o. female    Network Utilization Review Department  ATTENTION: Please call with any questions or concerns to [phone] and carefully listen to the prompts so that you are directed to the right person. All voicemails are confidential.   For Discharge needs, contact Care Management DC Support Team at [phone] opt. 2  Send all requests for admission clinical reviews, approved or denied determinations and any other requests to dedicated fax number below belonging to the campus where the patient is receiving treatment. List of dedicated fax numbers for the Facilities:  FACILITY NAME UR FAX NUMBER   ADMISSION DENIALS (Administrative/Medical Necessity) [phone]   DISCHARGE SUPPORT  TEAM (NETWORK) [phone]   PARENT CHILD HEALTH (Maternity/NICU/Pediatrics) [phone]   [facility] [phone]   [facility] [phone]   [facility] [phone]   [facility] [phone]   [facility] [phone]   [facility] [phone]   [facility] [phone]   [facility] [phone]   [facility] [phone]   [facility] [phone]   [facility] [phone]   [facility] [phone]

## 2025-03-08 NOTE — ASSESSMENT & PLAN NOTE
Initial pressure of 169/106 recorded at 9 AM.  The pressure continued to stay elevated, crossing 180 systolic, and was over 200 systolic when we evaluated her (207/101). No findings of end organ damage as there are currently no symptoms of signs of damage. Labs including trops, creatinine, LFTs, CXR, are all WNL. At this point would call this hypertensive emergency that resolved as her chest pain and weakness/numbness resolved, and is now hypertensive urgency.   BP improved following morning.    Plan:  - Continue home regimen of labetalol 100 mg twice daily  - Amlodipine 5 mg daily  - Hold home veramapil  - Begin lisinopril 5 mg  - hydralazine 5 mg every 4 hours IV as needed  - Labetalol 10 mg every 4 hours IV as needed  - q2h BP checks

## 2025-03-08 NOTE — PHYSICAL THERAPY NOTE
Physical Therapy Evaluation     Patient's Name: Amy Can    Admitting Diagnosis  Left sided numbness [R20.0]  Stroke-like symptoms [R29.90]    Problem List  Patient Active Problem List   Diagnosis    Nonrheumatic aortic (valve) insufficiency    Sleep apnea    Vitamin B12 deficiency    Migraine with aura and without status migrainosus, not intractable    Aortic dilatation (HCC)    Essential hypertension    Hyperlipidemia    Obesity (BMI 30.0-34.9)    Obesity, morbid (HCC)    Vitamin D deficiency    History of solitary pulmonary nodule    Stroke-like symptoms    TIA (transient ischemic attack)       Past Medical History  Past Medical History:   Diagnosis Date    Allergic rhinitis     Aortic regurgitation     Chronic kidney disease 2022    Heart murmur 2006    HTN (hypertension)     Hyperlipidemia     Hypertension 2006    Nonrheumatic aortic (valve) insufficiency     Sleep apnea     Vitamin B12 deficiency        Past Surgical History  Past Surgical History:   Procedure Laterality Date    BUNIONECTOMY      CHOLECYSTECTOMY      COLONOSCOPY  07/02/2018    DILATION AND CURETTAGE OF UTERUS      MAMMO (HISTORICAL)  03/21/2017 5/18/2018 and 3/21/2017     MS LAPAROSCOPIC APPENDECTOMY N/A 8/2/2021    Procedure: APPENDECTOMY LAPAROSCOPIC;  Surgeon: Percy Peñaloza MD;  [facility];  Service: Colorectal    WISDOM TOOTH EXTRACTION          03/08/25 0906   PT Last Visit   PT Visit Date 03/08/25   Note Type   Note type Evaluation   Pain Assessment   Pain Assessment Tool 0-10   Pain Score No Pain   Restrictions/Precautions   Weight Bearing Precautions Per Order No   Other Precautions Multiple lines;Fall Risk;Telemetry   Home Living   Type of Home House   Home Layout One level;Performs ADLs on one level;Able to live on main level with bedroom/bathroom  (3-4 ROLO)   Bathroom Shower/Tub Tub/shower unit   Bathroom Toilet Raised   Bathroom Equipment   (denies)   Home Equipment   (denies)   Prior Function   Level of  [place] Independent with ADLs;Independent with functional mobility;Independent with IADLS   Lives With Spouse   Receives Help From Family   IADLs Independent with meal prep;Independent with driving;Independent with medication management   Falls in the last 6 months 0   General   Family/Caregiver Present No   Cognition   Overall Cognitive Status WFL   Arousal/Participation Alert   Attention Within functional limits   Orientation Level Oriented X4   Memory Within functional limits   Following Commands Follows all commands and directions without difficulty   Comments pleasant and cooperative   RLE Assessment   RLE Assessment WFL   LLE Assessment   LLE Assessment WFL   Bed Mobility   Supine to Sit 6  Modified independent   Additional items HOB elevated;Bedrails   Sit to Supine Unable to assess   Additional Comments pt supine in bed upon arrival. pt left sitting OOB in recliner with all needs within reach - alarm on post session  (/97)   Transfers   Sit to Stand 7  Independent   Stand to Sit 7  Independent   Additional Comments no AD   Ambulation/Elevation   Gait pattern Short stride;Decreased foot clearance;Wide ZULMA   Gait Assistance 6  Modified independent   Assistive Device None   Distance 120'   Stair Management Assistance 5  Supervision   Stair Management Technique One rail L;Alternating pattern;Foreward   Number of Stairs 7   Balance   Static Sitting Fair +   Dynamic Sitting Fair +   Static Standing Fair +   Dynamic Standing Fair   Ambulatory Fair   Endurance Deficit   Endurance Deficit No   Activity Tolerance   Activity Tolerance Patient tolerated treatment well   Medical Staff Made Aware DEBORAH justice; co-session completed this date 2* increased medical complexity and multiple co-morbidities   Nurse Made Aware RN cleared   Assessment   Prognosis Good   Assessment Pt seen for moderate complexity PT evaluation. Pt with active PT eval/treat and OOB to chair orders. Pt is a 69 y.o. female who was admitted to  "[facility] on 3/7 with L-sided numbness and weakness, dx with stroke-like symptoms. CTA showing \"No acute intracranial abnormality\". Pt reports symptoms resolved at this time  Pt's active problems include: aortic dilation, TIA, migraine with aura.Pt  has a past medical history of Allergic rhinitis, Aortic regurgitation, Chronic kidney disease (2022), Heart murmur (2006), HTN (hypertension), Hyperlipidemia, Hypertension (2006), Nonrheumatic aortic (valve) insufficiency, Sleep apnea, and Vitamin B12 deficiency. Pt resides with spouse in 1  with 3-4 ROLO and was independent prior to hospital admission. Currently upon evaluation pt performing bed mobility tasks at mod I level, funational transfers at I level and ambulation at mod I level without AD- no overt LOB noted. Pt was left sitting OOB in recliner at the end of PT session with all needs within reach. Pt with no questions or concerns regarding d/c home; appears to be functioning at/ near baseline mobility levels. Pt with no further acute inpatient PT needs at this time- please re-consult if needed. PT to discharge pt and recommends home with increased support as needed. Encourage pt to ambulate at least 3-4x/day with restorative/ nursing staff while remaining in hospital. The patient's AM-PAC Basic Mobility Inpatient Short Form Raw Score is 23. A Raw score of greater than 16 suggests the patient may benefit from discharge to home. Please also refer to the recommendation of the Physical Therapist for safe discharge planning.   Goals   Patient Goals to go home   Plan   PT Frequency   (eval only- d/c PT)   Discharge Recommendation   Rehab Resource Intensity Level, PT No post-acute rehabilitation needs   AM-PAC Basic Mobility Inpatient   Turning in Flat Bed Without Bedrails 4   Lying on Back to Sitting on Edge of Flat Bed Without Bedrails 4   Moving Bed to Chair 4   Standing Up From Chair Using Arms 4   Walk in Room 4   Climb 3-5 Stairs With Railing 3 "   Basic Mobility Inpatient Raw Score 23   Basic Mobility Standardized Score 50.88   [facility] Highest Level Of Mobility   -HLM Goal 7: Walk 25 feet or more   -HLM Achieved 7: Walk 25 feet or more   Modified Golden Scale   Modified Johnston Scale 2           Armida Borjas, PT DPT

## 2025-03-08 NOTE — OCCUPATIONAL THERAPY NOTE
Occupational Therapy Evaluation     Patient Name: Amy Can  Today's Date: 3/8/2025  Problem List  Active Problems:    Migraine with aura and without status migrainosus, not intractable    Aortic dilatation (HCC)    Stroke-like symptoms    TIA (transient ischemic attack)    Past Medical History  Past Medical History:   Diagnosis Date    Allergic rhinitis     Aortic regurgitation     Chronic kidney disease 2022    Heart murmur 2006    HTN (hypertension)     Hyperlipidemia     Hypertension 2006    Nonrheumatic aortic (valve) insufficiency     Sleep apnea     Vitamin B12 deficiency      Past Surgical History  Past Surgical History:   Procedure Laterality Date    BUNIONECTOMY      CHOLECYSTECTOMY      COLONOSCOPY  07/02/2018    DILATION AND CURETTAGE OF UTERUS      MAMMO (HISTORICAL)  03/21/2017 5/18/2018 and 3/21/2017     HI LAPAROSCOPIC APPENDECTOMY N/A 8/2/2021    Procedure: APPENDECTOMY LAPAROSCOPIC;  Surgeon: Percy Peñaloza MD;  [facility]: [facility];  Service: Colorectal    WISDOM TOOTH EXTRACTION          03/08/25 0907   OT Last Visit   OT Visit Date 03/08/25   Note Type   Note type Evaluation   Pain Assessment   Pain Assessment Tool 0-10   Pain Score No Pain   Restrictions/Precautions   Weight Bearing Precautions Per Order No   Other Precautions Multiple lines;Telemetry   Home Living   Type of Home House   Home Layout One level;Performs ADLs on one level;Able to live on main level with bedroom/bathroom  (3-4 ROLO)   Bathroom Shower/Tub Tub/shower unit   Bathroom Toilet Raised   Bathroom Equipment   (pt denies)   Home Equipment   (pt denies)   Prior Function   Level of [place] Independent with ADLs;Independent with functional mobility;Independent with IADLS   Lives With Spouse   Receives Help From Family   IADLs Independent with driving;Independent with meal prep;Independent with medication management   Falls in the last 6 months 0   Lifestyle   Autonomy Pt reports I w/ ADLs, IADLs, and fxnl  mobility w/o AD at baseline; + driving.   Reciprocal Relationships Pt lives w/ her spouse.   Intrinsic Gratification Pt enjoys gardening and caring for her dog.   General   Family/Caregiver Present   (spouse present)   Additional General Comments Pt reports that her symptoms resolved prior to OT evaluation.   ADL   Where Assessed Edge of bed   Eating Assistance 7  Independent   Grooming Assistance 7  Independent   UB Bathing Assistance 6  Modified Independent   LB Bathing Assistance 5  Supervision/Setup   UB Dressing Assistance 6  Modified independent   UB Dressing Deficit Setup;Thread RUE;Thread LUE;Pull around back   LB Dressing Assistance 5  Supervision/Setup   Toileting Assistance  5  Supervision/Setup   Bed Mobility   Supine to Sit 6  Modified independent   Additional items HOB elevated;Bedrails   Sit to Supine Unable to assess   Additional Comments Pt seated OOB in chair at end of OT evaluation w/ all needs within reach.   Transfers   Sit to Stand 7  Independent   Stand to Sit 7  Independent   Additional Comments completed w/o AD   Functional Mobility   Functional Mobility 6  Modified independent   Additional Comments Pt ambulated household distance w/ mod I and w/o AD.   Additional items   (no AD)   Balance   Static Sitting Good   Dynamic Sitting Good   Static Standing Fair +   Dynamic Standing Fair   Ambulatory Fair   Activity Tolerance   Activity Tolerance Patient tolerated treatment well   Medical Staff Made Aware PTArmida, due to pt's medical complexity and multiple comorbidities   Nurse Made Aware RN clearance prior to session   RUE Assessment   RUE Assessment WFL   LUE Assessment   LUE Assessment WFL   Hand Function   Gross Motor Coordination Functional   Fine Motor Coordination Functional   Cognition   Overall Cognitive Status WFL   Arousal/Participation Alert;Responsive;Cooperative   Attention Within functional limits   Orientation Level Oriented X4   Memory Within functional limits   Following  Commands Follows all commands and directions without difficulty   Comments Pt identified by name and . Pt was pleasant, cooperative, and willing to participate in OT evaluation.   Assessment   Assessment Pt is a 69 y.o. female seen for OT evaluation s/p admission to [facility] on 3/7/2025 due to L-sided numbness and weakness. Pt diagnosed with stroke-like symptoms. Pt has a significant PMH impacting occupational performance including: Allergic rhinitis, Aortic regurgitation, Chronic kidney disease, Heart murmur, HTN (hypertension), Hyperlipidemia, Hypertension, Nonrheumatic aortic (valve) insufficiency, Sleep apnea, and Vitamin B12 deficiency. Pt with active OT evaluation and treatment orders and activity orders. PTA, pt living with her spouse in a 1 SH w/ 3-4 ROLO. Pt reports I w/ ADLs, IADLs, and fxnl mobility w/o AD at baseline; + driving. Pt agreeable and willing to participate in OT evaluation. During evaluation, pt was I for eating and grooming, mod I for UB ADLs, and S for LB ADLs and toileting. Pt was also mod I for bed mobility and fxnl mobility w/o AD and I for transfers. Pt performing ADLs at/near baseline level of independence and reports having good social support upon return home. No further acute OT needs identified at this time. Recommend continued active ADL participation and mobilization with hospital staff while in the hospital to increase pt’s endurance and strength upon D/C. From OT standpoint, recommend D/C to home with family support when medically cleared. D/C pt from OT caseload at this time.   Goals   Patient Goals to go home   Plan   OT Frequency Eval only   Discharge Recommendation   Rehab Resource Intensity Level, OT No post-acute rehabilitation needs   AM-PAC Daily Activity Inpatient   Lower Body Dressing 3   Bathing 3   Toileting 3   Upper Body Dressing 4   Grooming 4   Eating 4   Daily Activity Raw Score 21   Daily Activity Standardized Score (Calc for Raw Score >=11)  44.27   -[facility] Applied Cognition Inpatient   Following a Speech/Presentation 4   Understanding Ordinary Conversation 4   Taking Medications 4   Remembering Where Things Are Placed or Put Away 4   Remembering List of 4-5 Errands 4   Taking Care of Complicated Tasks 4   Applied Cognition Raw Score 24   Applied Cognition Standardized Score 62.21       The patient's raw score on the AM-PAC Daily Activity Inpatient Short Form is 21. A raw score of greater than or equal to 19 suggests the patient may benefit from discharge to home. Please refer to the recommendation of the Occupational Therapist for safe discharge planning.    BRANDEE Carrasco, OTR/L

## 2025-03-08 NOTE — CASE MANAGEMENT
Case Management Assessment & Discharge Planning Note    Patient name Amy Can  Location [facility] 711-01 MRN [id number]  : 1955 Date 3/8/2025       Current Admission Date: 3/7/2025  Current Admission Diagnosis:Stroke-like symptoms   Patient Active Problem List    Diagnosis Date Noted Date Diagnosed    Stroke-like symptoms 2025     TIA (transient ischemic attack) 2025     History of solitary pulmonary nodule 2023     Obesity, morbid (HCC) 2021     Vitamin D deficiency 2021     Essential hypertension      Hyperlipidemia      Obesity (BMI 30.0-34.9)      Migraine with aura and without status migrainosus, not intractable 2021     Aortic dilatation (HCC) 2021     Nonrheumatic aortic (valve) insufficiency      Sleep apnea      Vitamin B12 deficiency        LOS (days): 0  Geometric Mean LOS (GMLOS) (days):   Days to GMLOS:     OBJECTIVE:    Risk of Unplanned Readmission Score: 9.58         Current admission status: Inpatient       Preferred Pharmacy:   [id number] - [place], [place] - [address]  [address]  [place] [address]  Phone: [phone] Fax: [phone]    [facility] - [address] [address]  Phone: [phone] Fax: [phone]    Primary Care Provider: Ross Griffith MD    Primary Insurance: [employer] REP  Secondary Insurance:     ASSESSMENT:  Active Health Care Proxies       Yves Can [facility] Care Representative - Spouse   Primary Phone: [phone] (Home)  Mobile Phone: [phone]                 Patient Information  Admitted from:: Home  Mental Status: Alert  During Assessment patient was accompanied by: Not accompanied during assessment  Assessment information provided by:: Patient  Primary Caregiver: Self  Support Systems: Spouse/significant other  What city do you live in?: [place]  Home entry access  options. Select all that apply.: Stairs  Number of steps to enter home.: 3  Type of Current Residence: [facility]  Living Arrangements: Lives w/ Spouse/significant other  Is patient a ?: No    Activities of Daily Living Prior to Admission  Functional Status: Independent  Completes ADLs independently?: Yes  Ambulates independently?: Yes  Does patient use assisted devices?: No  Does patient currently own DME?: No  Does patient have a history of Outpatient Therapy (PT/OT)?: No  Does the patient have a history of Short-Term Rehab?: No  Does patient have a history of HHC?: No  Does patient currently have HHC?: No    Patient Information Continued  Income Source: Pension/longterm  Does patient have prescription coverage?: Yes  Does patient receive dialysis treatments?: No  Does patient have a history of substance abuse?: No  Does patient have a history of Mental Health Diagnosis?: No    Means of Transportation  Means of Transport to Appts:: Drives Self      DISCHARGE DETAILS:    Discharge planning discussed with:: Patient  Freedom of Choice: Yes  Comments - Freedom of Choice: Discussed FOC  CM contacted family/caregiver?: No- see comments (declined)    This CM introduced self and role to patient.  Lives with spouse in ranch style home, 3 ROLO, no DME at home, IADLS.  No history of OP therapy, HH, or STR

## 2025-03-09 ENCOUNTER — APPOINTMENT (INPATIENT)
Dept: RADIOLOGY | Facility: HOSPITAL | Age: 70
DRG: 305 | End: 2025-03-09
Payer: COMMERCIAL

## 2025-03-09 ENCOUNTER — APPOINTMENT (INPATIENT)
Dept: NON INVASIVE DIAGNOSTICS | Facility: HOSPITAL | Age: 70
DRG: 305 | End: 2025-03-09
Payer: COMMERCIAL

## 2025-03-09 LAB
ANION GAP SERPL CALCULATED.3IONS-SCNC: 9 MMOL/L (ref 4–13)
AORTIC ROOT: 3.4 CM
AORTIC VALVE MEAN VELOCITY: 15.4 M/S
ASCENDING AORTA: 3.9 CM
AV AREA BY CONTINUOUS VTI: 2.5 CM2
AV AREA PEAK VELOCITY: 2.6 CM2
AV LVOT MEAN GRADIENT: 4 MMHG
AV LVOT PEAK GRADIENT: 8 MMHG
AV MEAN PRESS GRAD SYS DOP V1V2: 10 MMHG
AV ORIFICE AREA US: 2.54 CM2
AV PEAK GRADIENT: 17 MMHG
AV REGURGITATION PRESSURE HALF TIME: 444 MS
AV VELOCITY RATIO: 0.67
AV VMAX SYS DOP: 2.03 M/S
BASOPHILS # BLD AUTO: 0.05 THOUSANDS/ÂΜL (ref 0–0.1)
BASOPHILS NFR BLD AUTO: 1 % (ref 0–1)
BSA FOR ECHO PROCEDURE: 2.12 M2
BUN SERPL-MCNC: 18 MG/DL (ref 5–25)
CALCIUM SERPL-MCNC: 8.8 MG/DL (ref 8.4–10.2)
CHLORIDE SERPL-SCNC: 109 MMOL/L (ref 96–108)
CO2 SERPL-SCNC: 24 MMOL/L (ref 21–32)
CREAT SERPL-MCNC: 0.94 MG/DL (ref 0.6–1.3)
DOP CALC AO VTI: 35.52 CM
DOP CALC LVOT AREA: 3.8 CM2
DOP CALC LVOT CARDIAC INDEX: 3.82 L/MIN/M2
DOP CALC LVOT CARDIAC OUTPUT: 8.09 L/MIN
DOP CALC LVOT DIAMETER: 2.2 CM
DOP CALC LVOT PEAK VEL VTI: 23.74 CM
DOP CALC LVOT PEAK VEL: 1.39 M/S
DOP CALC LVOT STROKE INDEX: 42 ML/M2
DOP CALC LVOT STROKE VOLUME: 90.2
EOSINOPHIL # BLD AUTO: 0.11 THOUSAND/ÂΜL (ref 0–0.61)
EOSINOPHIL NFR BLD AUTO: 2 % (ref 0–6)
ERYTHROCYTE [DISTWIDTH] IN BLOOD BY AUTOMATED COUNT: 14.8 % (ref 11.6–15.1)
FRACTIONAL SHORTENING: 33 (ref 28–44)
GFR SERPL CREATININE-BSD FRML MDRD: 61 ML/MIN/1.73SQ M
GLUCOSE SERPL-MCNC: 98 MG/DL (ref 65–140)
HCT VFR BLD AUTO: 44.4 % (ref 34.8–46.1)
HGB BLD-MCNC: 14.5 G/DL (ref 11.5–15.4)
IMM GRANULOCYTES # BLD AUTO: 0.02 THOUSAND/UL (ref 0–0.2)
IMM GRANULOCYTES NFR BLD AUTO: 0 % (ref 0–2)
INTERVENTRICULAR SEPTUM IN DIASTOLE (PARASTERNAL SHORT AXIS VIEW): 1.7 CM
INTERVENTRICULAR SEPTUM: 1.7 CM (ref 0.6–1.1)
LAAS-AP2: 11.9 CM2
LAAS-AP4: 8.9 CM2
LEFT ATRIUM SIZE: 3.9 CM
LEFT ATRIUM VOLUME (MOD BIPLANE): 22 ML
LEFT ATRIUM VOLUME INDEX (MOD BIPLANE): 10.4 ML/M2
LEFT INTERNAL DIMENSION IN SYSTOLE: 3.1 CM (ref 2.1–4)
LEFT VENTRICULAR INTERNAL DIMENSION IN DIASTOLE: 4.6 CM (ref 3.5–6)
LEFT VENTRICULAR POSTERIOR WALL IN END DIASTOLE: 1.2 CM
LEFT VENTRICULAR STROKE VOLUME: 56 ML
LV EF US.2D.A4C+ESTIMATED: 52 %
LVSV (TEICH): 56 ML
LYMPHOCYTES # BLD AUTO: 1.93 THOUSANDS/ÂΜL (ref 0.6–4.47)
LYMPHOCYTES NFR BLD AUTO: 31 % (ref 14–44)
MCH RBC QN AUTO: 30.5 PG (ref 26.8–34.3)
MCHC RBC AUTO-ENTMCNC: 32.7 G/DL (ref 31.4–37.4)
MCV RBC AUTO: 93 FL (ref 82–98)
MONOCYTES # BLD AUTO: 0.44 THOUSAND/ÂΜL (ref 0.17–1.22)
MONOCYTES NFR BLD AUTO: 7 % (ref 4–12)
MV E'TISSUE VEL-SEP: 5 CM/S
NEUTROPHILS # BLD AUTO: 3.77 THOUSANDS/ÂΜL (ref 1.85–7.62)
NEUTS SEG NFR BLD AUTO: 59 % (ref 43–75)
NRBC BLD AUTO-RTO: 0 /100 WBCS
PLATELET # BLD AUTO: 170 THOUSANDS/UL (ref 149–390)
PMV BLD AUTO: 11.9 FL (ref 8.9–12.7)
POTASSIUM SERPL-SCNC: 4 MMOL/L (ref 3.5–5.3)
RA PRESSURE ESTIMATED: 3 MMHG
RBC # BLD AUTO: 4.76 MILLION/UL (ref 3.81–5.12)
RIGHT ATRIUM AREA SYSTOLE A4C: 13 CM2
RIGHT VENTRICLE ID DIMENSION: 2.8 CM
RV PSP: 28 MMHG
SINOTUBULAR JUNCTION: 3.1 CM
SL CV AV DECELERATION TIME RETROGRADE: 1530 MS
SL CV AV PEAK GRADIENT RETROGRADE: 88 MMHG
SL CV LEFT ATRIUM LENGTH A2C: 4.3 CM
SL CV LV EF: 50
SL CV PED ECHO LEFT VENTRICLE DIASTOLIC VOLUME (MOD BIPLANE) 2D: 95 ML
SL CV PED ECHO LEFT VENTRICLE SYSTOLIC VOLUME (MOD BIPLANE) 2D: 38 ML
SL CV SINUS OF VALSALVA 2D: 3.4 CM
SODIUM SERPL-SCNC: 142 MMOL/L (ref 135–147)
STJ: 3.1 CM
TR MAX PG: 25 MMHG
TR PEAK VELOCITY: 2.5 M/S
TRICUSPID ANNULAR PLANE SYSTOLIC EXCURSION: 1.8 CM
TRICUSPID VALVE PEAK REGURGITATION VELOCITY: 2.49 M/S
WBC # BLD AUTO: 6.32 THOUSAND/UL (ref 4.31–10.16)

## 2025-03-09 PROCEDURE — 93306 TTE W/DOPPLER COMPLETE: CPT

## 2025-03-09 PROCEDURE — 94760 N-INVAS EAR/PLS OXIMETRY 1: CPT

## 2025-03-09 PROCEDURE — 80048 BASIC METABOLIC PNL TOTAL CA: CPT

## 2025-03-09 PROCEDURE — 93306 TTE W/DOPPLER COMPLETE: CPT | Performed by: STUDENT IN AN ORGANIZED HEALTH CARE EDUCATION/TRAINING PROGRAM

## 2025-03-09 PROCEDURE — 99232 SBSQ HOSP IP/OBS MODERATE 35: CPT | Performed by: STUDENT IN AN ORGANIZED HEALTH CARE EDUCATION/TRAINING PROGRAM

## 2025-03-09 PROCEDURE — 85025 COMPLETE CBC W/AUTO DIFF WBC: CPT

## 2025-03-09 PROCEDURE — 70551 MRI BRAIN STEM W/O DYE: CPT

## 2025-03-09 RX ORDER — LISINOPRIL 5 MG/1
5 TABLET ORAL ONCE
Status: COMPLETED | OUTPATIENT
Start: 2025-03-09 | End: 2025-03-09

## 2025-03-09 RX ORDER — MAGNESIUM HYDROXIDE/ALUMINUM HYDROXICE/SIMETHICONE 120; 1200; 1200 MG/30ML; MG/30ML; MG/30ML
30 SUSPENSION ORAL EVERY 4 HOURS PRN
Status: DISCONTINUED | OUTPATIENT
Start: 2025-03-09 | End: 2025-03-10 | Stop reason: HOSPADM

## 2025-03-09 RX ORDER — VERAPAMIL HYDROCHLORIDE 80 MG/1
80 TABLET ORAL EVERY 8 HOURS SCHEDULED
Status: DISCONTINUED | OUTPATIENT
Start: 2025-03-09 | End: 2025-03-10 | Stop reason: HOSPADM

## 2025-03-09 RX ORDER — ONDANSETRON 2 MG/ML
4 INJECTION INTRAMUSCULAR; INTRAVENOUS EVERY 6 HOURS PRN
Status: DISCONTINUED | OUTPATIENT
Start: 2025-03-09 | End: 2025-03-10 | Stop reason: HOSPADM

## 2025-03-09 RX ORDER — LISINOPRIL 10 MG/1
10 TABLET ORAL DAILY
Status: DISCONTINUED | OUTPATIENT
Start: 2025-03-10 | End: 2025-03-10 | Stop reason: HOSPADM

## 2025-03-09 RX ADMIN — AMLODIPINE BESYLATE 5 MG: 5 TABLET ORAL at 08:31

## 2025-03-09 RX ADMIN — VERAPAMIL HYDROCHLORIDE 80 MG: 80 TABLET ORAL at 13:47

## 2025-03-09 RX ADMIN — LISINOPRIL 5 MG: 5 TABLET ORAL at 08:31

## 2025-03-09 RX ADMIN — LABETALOL HYDROCHLORIDE 100 MG: 100 TABLET, FILM COATED ORAL at 08:31

## 2025-03-09 RX ADMIN — ENOXAPARIN SODIUM 40 MG: 40 INJECTION SUBCUTANEOUS at 08:31

## 2025-03-09 RX ADMIN — VERAPAMIL HYDROCHLORIDE 80 MG: 80 TABLET ORAL at 21:22

## 2025-03-09 RX ADMIN — AMITRIPTYLINE HYDROCHLORIDE 10 MG: 10 TABLET ORAL at 21:22

## 2025-03-09 RX ADMIN — LORAZEPAM 0.5 MG: 2 INJECTION INTRAMUSCULAR; INTRAVENOUS at 01:08

## 2025-03-09 RX ADMIN — LISINOPRIL 5 MG: 5 TABLET ORAL at 11:24

## 2025-03-09 RX ADMIN — ATORVASTATIN CALCIUM 40 MG: 40 TABLET, FILM COATED ORAL at 16:05

## 2025-03-09 RX ADMIN — ALUMINUM HYDROXIDE, MAGNESIUM HYDROXIDE, AND SIMETHICONE 30 ML: 200; 200; 20 SUSPENSION ORAL at 11:24

## 2025-03-09 RX ADMIN — ASPIRIN 81 MG: 81 TABLET, COATED ORAL at 08:31

## 2025-03-09 RX ADMIN — ONDANSETRON 4 MG: 2 INJECTION INTRAMUSCULAR; INTRAVENOUS at 13:47

## 2025-03-09 RX ADMIN — LABETALOL HYDROCHLORIDE 100 MG: 100 TABLET, FILM COATED ORAL at 18:14

## 2025-03-09 RX ADMIN — ACETAMINOPHEN 650 MG: 325 TABLET, FILM COATED ORAL at 16:09

## 2025-03-09 NOTE — PROGRESS NOTES
Progress Note - Internal Medicine   Name: Amy Can 69 y.o. female I MRN: [id number]  Unit[facility] I Date of Admission: 3/7/2025   Date of Service: 3/9/2025 I Hospital Day: 1     Assessment & Plan  Stroke-like symptoms  Initial pressure of 169/106 recorded at 9 AM.  The pressure continued to stay elevated, crossing 180 systolic, and was over 200 systolic when we evaluated her (207/101). No findings of end organ damage as there are currently no symptoms of signs of damage. Labs including trops, creatinine, LFTs, CXR, are all WNL. At this point would call this hypertensive emergency that resolved as her chest pain and weakness/numbness resolved, and is now hypertensive urgency.   BP improved following morning.    Plan:  - Continue home regimen of labetalol 100 mg twice daily  - Resume home Verapamil 80 mg TID  - Increase Lisinopril to 10 mg  - hydralazine 5 mg every 4 hours IV as needed  - Labetalol 10 mg every 4 hours IV as needed  - q2h BP checks   TIA (transient ischemic attack)  A 15-minute episode of left-sided weakness as well as numbness/tingling in face, arm, leg that self resolved.  Neuro examination shows no residual deficits.  will currently manage as TIA pending neurology recommendations   A1c 5.6,    Plan:  - Neuro consulted, pending echo and MRI brain  - Aspirin 81 daily  - Atorvastatin 40 mg daily  - Monitor on telemetry  Migraine with aura and without status migrainosus, not intractable  Acute episodic migraine for which patient takes home protriptyline 5 mg nightly    Plan:  - Amitriptyline 10 mg at night inpatient due to formulary availability  Aortic dilatation (HCC)  Ectatic proximal ascending aorta measuring 43 mm in diameter    Plan:  - Follow-up CTA chest with a 1 year    Disposition: inpatient; med/surg - likely discharge tomorrow.     Team: [facility] TEAM B    Subjective   Patient seen and examined. No acute events overnight.    Objective :  Temp:  [97.7 °F (36.5 °C)-98.6 °F (37 °C)]  97.7 °F (36.5 °C)  HR:  [] 90  BP: (109-169)/(70-96) 162/90  Resp:  [16] 16  SpO2:  [93 %-96 %] 93 %  O2 Device: None (Room air)    I/O         03/07 0701  03/08 0700 03/08 0701  03/09 0700 03/09 0701  03/10 0700    P.O.  222     Total Intake(mL/kg)  222     Net  +222                  Weights:   IBW (Ideal Body Weight): 57 kg    Body mass index is 39.11 kg/m².  Weight (last 2 days)       Date/Time Weight    03/09/25 1000 107 (235)            Physical Exam  Vitals reviewed.   Constitutional:       General: She is not in acute distress.     Appearance: She is not ill-appearing.   HENT:      Head: Normocephalic and atraumatic.      Right Ear: External ear normal.      Left Ear: External ear normal.      Nose: Nose normal.      Mouth/Throat:      Mouth: Mucous membranes are moist.   Eyes:      Conjunctiva/sclera: Conjunctivae normal.   Cardiovascular:      Rate and Rhythm: Normal rate and regular rhythm.      Heart sounds: Normal heart sounds.   Pulmonary:      Effort: Pulmonary effort is normal. No respiratory distress.      Breath sounds: Normal breath sounds.   Abdominal:      General: Bowel sounds are normal. There is no distension.      Palpations: Abdomen is soft.      Tenderness: There is no abdominal tenderness. There is no guarding.   Musculoskeletal:         General: No swelling.      Right lower leg: No edema.      Left lower leg: No edema.   Skin:     General: Skin is warm and dry.   Neurological:      General: No focal deficit present.      Mental Status: She is alert and oriented to person, place, and time. Mental status is at baseline.      Cranial Nerves: No cranial nerve deficit.      Sensory: No sensory deficit.      Motor: No weakness.   Psychiatric:         Mood and Affect: Mood normal.         Behavior: Behavior normal.           Lab Results: I have reviewed the following results:  Recent Labs     03/07/25  0916 03/07/25  1041 03/07/25  1119 03/07/25  1448 03/08/25  0554 03/09/25  0519   WBC  5.66  --   --   --  5.09 6.32   HGB 14.7  --   --   --  14.6 14.5   HCT 44.3  --   --   --  44.4 44.4     --   --    < > 167 170   SODIUM 136  --   --   --  142 142   K 5.2  --   --   --  3.9 4.0     --   --   --  108 109*   CO2 23  --   --   --  25 24   BUN 21  --   --   --  19 18   CREATININE 1.00  --   --   --  0.96 0.94   GLUC 113  --   --   --  98 98   MG  --   --   --   --  1.9  --    PHOS  --   --   --   --  4.0  --    AST 27  --   --   --  15  --    ALT 8  --   --   --  8  --    ALB 4.1  --   --   --  3.7  --    TBILI 0.67  --   --   --  0.59  --    ALKPHOS 72  --   --   --  67  --    PTT  --  25  --   --   --   --    INR  --  0.94  --   --   --   --    HSTNI0 6  --   --   --   --   --    HSTNI2  --   --  6  --   --   --     < > = values in this interval not displayed.       Imaging Results Review: No pertinent imaging studies reviewed.  Other Study Results Review: No additional pertinent studies reviewed.    Currently Ordered Meds:   Current Facility-Administered Medications:     acetaminophen (TYLENOL) tablet 650 mg, Q6H PRN    aluminum-magnesium hydroxide-simethicone (MAALOX) oral suspension 30 mL, Q4H PRN    amitriptyline (ELAVIL) tablet 10 mg, HS    aspirin (ECOTRIN LOW STRENGTH) EC tablet 81 mg, Daily    atorvastatin (LIPITOR) tablet 40 mg, Daily With Dinner    enoxaparin (LOVENOX) subcutaneous injection 40 mg, Daily    hydrALAZINE (APRESOLINE) injection 5 mg, Q4H PRN    labetalol (NORMODYNE) injection 10 mg, Q4H PRN    labetalol (NORMODYNE) tablet 100 mg, BID    [START ON 3/10/2025] lisinopril (ZESTRIL) tablet 10 mg, Daily    lisinopril (ZESTRIL) tablet 5 mg, Once    senna (SENOKOT) tablet 8.6 mg, HS PRN    verapamil (CALAN) tablet 80 mg, Q8H RIAN  VTE Pharmacologic Prophylaxis: VTE covered by:  enoxaparin, Subcutaneous, 40 mg at 03/09/25 0831     VTE Mechanical Prophylaxis: sequential compression device    Administrative Statements   I have spent a total time of 30 minutes in caring for  this patient on the day of the visit/encounter including Counseling / Coordination of care, Documenting in the medical record, Reviewing/placing orders in the medical record (including tests, medications, and/or procedures), Obtaining or reviewing history  , and Communicating with other healthcare professionals .  Portions of the record may have been created with voice recognition software.

## 2025-03-09 NOTE — ASSESSMENT & PLAN NOTE
Initial pressure of 169/106 recorded at 9 AM.  The pressure continued to stay elevated, crossing 180 systolic, and was over 200 systolic when we evaluated her (207/101). No findings of end organ damage as there are currently no symptoms of signs of damage. Labs including trops, creatinine, LFTs, CXR, are all WNL. At this point would call this hypertensive emergency that resolved as her chest pain and weakness/numbness resolved, and is now hypertensive urgency.   BP improved following morning.    Plan:  - Continue home regimen of labetalol 100 mg twice daily  - Resume home Verapamil 80 mg TID  - Increase Lisinopril to 10 mg  - hydralazine 5 mg every 4 hours IV as needed  - Labetalol 10 mg every 4 hours IV as needed  - q2h BP checks

## 2025-03-09 NOTE — PLAN OF CARE
Problem: PAIN - ADULT  Goal: Verbalizes/displays adequate comfort level or baseline comfort level  Description: Interventions:  - Encourage patient to monitor pain and request assistance  - Assess pain using appropriate pain scale  - Administer analgesics based on type and severity of pain and evaluate response  - Implement non-pharmacological measures as appropriate and evaluate response  - Consider cultural and social influences on pain and pain management  - Notify physician/advanced practitioner if interventions unsuccessful or patient reports new pain  Outcome: Progressing     Problem: SAFETY ADULT  Goal: Patient will remain free of falls  Description: INTERVENTIONS:  - Educate patient/family on patient safety including physical limitations  - Instruct patient to call for assistance with activity   - Consult OT/PT to assist with strengthening/mobility   - Keep Call bell within reach  - Keep bed low and locked with side rails adjusted as appropriate  - Keep care items and personal belongings within reach  - Initiate and maintain comfort rounds  - Make Fall Risk Sign visible to staff  - Initiate/Maintain bed and chair alarms  - Apply yellow socks and bracelet for high fall risk patients  - Consider moving patient to room near nurses station  Outcome: Progressing     Problem: SAFETY ADULT  Goal: Maintain or return to baseline ADL function  Description: INTERVENTIONS:  -  Assess patient's ability to carry out ADLs; assess patient's baseline for ADL function and identify physical deficits which impact ability to perform ADLs (bathing, care of mouth/teeth, toileting, grooming, dressing, etc.)  - Assess/evaluate cause of self-care deficits   - Assess range of motion  - Assess patient's mobility; develop plan if impaired  - Assess patient's need for assistive devices and provide as appropriate  - Encourage maximum independence but intervene and supervise when necessary  - Involve family in performance of ADLs  -  Assess for home care needs following discharge   - Consider OT consult to assist with ADL evaluation and planning for discharge  - Provide patient education as appropriate  Outcome: Progressing     Problem: SAFETY ADULT  Goal: Maintains/Returns to pre admission functional level  Description: INTERVENTIONS:  - Perform AM-PAC 6 Click Basic Mobility/ Daily Activity assessment daily.  - Set and communicate daily mobility goal to care team and patient/family/caregiver.   - Collaborate with rehabilitation services on mobility goals if consulted  - Out of bed for toileting  - Record patient progress and toleration of activity level   Outcome: Progressing     Problem: DISCHARGE PLANNING  Goal: Discharge to home or other facility with appropriate resources  Description: INTERVENTIONS:  - Identify barriers to discharge w/patient and caregiver  - Arrange for needed discharge resources and transportation as appropriate  - Identify discharge learning needs (meds, wound care, etc.)  - Arrange for interpretive services to assist at discharge as needed  - Refer to Case Management Department for coordinating discharge planning if the patient needs post-hospital services based on physician/advanced practitioner order or complex needs related to functional status, cognitive ability, or social support system  Outcome: Progressing     Problem: SKIN/TISSUE INTEGRITY - ADULT  Goal: Skin Integrity remains intact(Skin Breakdown Prevention)  Description: Assess:  -Clean and moisturize skin as needed  -Inspect skin when repositioning, toileting, and assisting with ADLS  -Assess extremities for adequate circulation and sensation     Bed Management:  -Have minimal linens on bed & keep smooth, unwrinkled  -Change linens as needed when moist or perspiring  -Avoid sitting or lying in one position for more than 2 hours while in bed    Toileting:  -Offer bedside commode    Activity:  -Encourage activity and walks on unit  -Encourage or provide ROM  exercises   -Turn and reposition patient every 2 Hours  -Use appropriate equipment to lift or move patient in bed  -Instruct/ Assist with weight shifting every 2 when out of bed in chair      Skin Care:  -Avoid use of baby powder, tape, friction and shearing, hot water or constrictive clothing  -Do not massage red bony areas    Next Steps:  -Teach patient strategies to minimize risks such as pressure injuries and DVT   Outcome: Progressing     Problem: MUSCULOSKELETAL - ADULT  Goal: Maintain or return mobility to safest level of function  Description: INTERVENTIONS:  - Assess patient's ability to carry out ADLs; assess patient's baseline for ADL function and identify physical deficits which impact ability to perform ADLs (bathing, care of mouth/teeth, toileting, grooming, dressing, etc.)  - Assess/evaluate cause of self-care deficits   - Assess range of motion  - Assess patient's mobility  - Assess patient's need for assistive devices and provide as appropriate  - Encourage maximum independence but intervene and supervise when necessary  - Involve family in performance of ADLs  - Assess for home care needs following discharge   - Consider OT consult to assist with ADL evaluation and planning for discharge  - Provide patient education as appropriate  Outcome: Progressing

## 2025-03-09 NOTE — QUICK NOTE
MRIB completed, no acute intracranial abnormalities seen.    Due to positive sxs such as tingling & various other reported complaints, less likely TIA & more likely MSK vs HTN related in setting of pain / anxiety w/o recent hx of HTN noted.    Ok for pt to remain on ASA / higher statin for the chronic cerebellar infarcts from neuro perspective until seen by o/p neuro for one time appt.    Amy Cna will need follow-up in in 6 weeks with neurovascular team attending / AP for TIA in 60 minute appointment. They will not require outpatient neurological testing.    No further I/p neuro recs at this time.

## 2025-03-10 ENCOUNTER — TELEPHONE (OUTPATIENT)
Dept: INTERNAL MEDICINE CLINIC | Facility: CLINIC | Age: 70
End: 2025-03-10

## 2025-03-10 ENCOUNTER — TELEPHONE (OUTPATIENT)
Age: 70
End: 2025-03-10

## 2025-03-10 VITALS
HEART RATE: 85 BPM | TEMPERATURE: 97.5 F | RESPIRATION RATE: 18 BRPM | HEIGHT: 65 IN | DIASTOLIC BLOOD PRESSURE: 79 MMHG | SYSTOLIC BLOOD PRESSURE: 141 MMHG | BODY MASS INDEX: 39.15 KG/M2 | WEIGHT: 235 LBS | OXYGEN SATURATION: 93 %

## 2025-03-10 PROBLEM — I16.1 HYPERTENSIVE EMERGENCY: Status: ACTIVE | Noted: 2025-03-10

## 2025-03-10 LAB
ANION GAP SERPL CALCULATED.3IONS-SCNC: 9 MMOL/L (ref 4–13)
ATRIAL RATE: 90 BPM
BASOPHILS # BLD AUTO: 0.02 THOUSANDS/ÂΜL (ref 0–0.1)
BASOPHILS NFR BLD AUTO: 0 % (ref 0–1)
BUN SERPL-MCNC: 17 MG/DL (ref 5–25)
CALCIUM SERPL-MCNC: 8.6 MG/DL (ref 8.4–10.2)
CHLORIDE SERPL-SCNC: 106 MMOL/L (ref 96–108)
CO2 SERPL-SCNC: 23 MMOL/L (ref 21–32)
CREAT SERPL-MCNC: 1.04 MG/DL (ref 0.6–1.3)
EOSINOPHIL # BLD AUTO: 0.08 THOUSAND/ÂΜL (ref 0–0.61)
EOSINOPHIL NFR BLD AUTO: 1 % (ref 0–6)
ERYTHROCYTE [DISTWIDTH] IN BLOOD BY AUTOMATED COUNT: 15 % (ref 11.6–15.1)
GFR SERPL CREATININE-BSD FRML MDRD: 54 ML/MIN/1.73SQ M
GLUCOSE SERPL-MCNC: 106 MG/DL (ref 65–140)
HCT VFR BLD AUTO: 49.3 % (ref 34.8–46.1)
HGB BLD-MCNC: 15.9 G/DL (ref 11.5–15.4)
IMM GRANULOCYTES # BLD AUTO: 0.04 THOUSAND/UL (ref 0–0.2)
IMM GRANULOCYTES NFR BLD AUTO: 1 % (ref 0–2)
LYMPHOCYTES # BLD AUTO: 0.86 THOUSANDS/ÂΜL (ref 0.6–4.47)
LYMPHOCYTES NFR BLD AUTO: 14 % (ref 14–44)
MCH RBC QN AUTO: 30.4 PG (ref 26.8–34.3)
MCHC RBC AUTO-ENTMCNC: 32.3 G/DL (ref 31.4–37.4)
MCV RBC AUTO: 94 FL (ref 82–98)
MONOCYTES # BLD AUTO: 0.3 THOUSAND/ÂΜL (ref 0.17–1.22)
MONOCYTES NFR BLD AUTO: 5 % (ref 4–12)
NEUTROPHILS # BLD AUTO: 4.83 THOUSANDS/ÂΜL (ref 1.85–7.62)
NEUTS SEG NFR BLD AUTO: 79 % (ref 43–75)
NRBC BLD AUTO-RTO: 0 /100 WBCS
PLATELET # BLD AUTO: 171 THOUSANDS/UL (ref 149–390)
PMV BLD AUTO: 11.6 FL (ref 8.9–12.7)
POTASSIUM SERPL-SCNC: 4.3 MMOL/L (ref 3.5–5.3)
QRS AXIS: -40 DEGREES
QRSD INTERVAL: 80 MS
QT INTERVAL: 392 MS
QTC INTERVAL: 471 MS
RBC # BLD AUTO: 5.23 MILLION/UL (ref 3.81–5.12)
SODIUM SERPL-SCNC: 138 MMOL/L (ref 135–147)
T WAVE AXIS: 4 DEGREES
VENTRICULAR RATE: 87 BPM
WBC # BLD AUTO: 6.13 THOUSAND/UL (ref 4.31–10.16)

## 2025-03-10 PROCEDURE — 99238 HOSP IP/OBS DSCHRG MGMT 30/<: CPT | Performed by: INTERNAL MEDICINE

## 2025-03-10 PROCEDURE — 85025 COMPLETE CBC W/AUTO DIFF WBC: CPT

## 2025-03-10 PROCEDURE — 93010 ELECTROCARDIOGRAM REPORT: CPT | Performed by: INTERNAL MEDICINE

## 2025-03-10 PROCEDURE — 80048 BASIC METABOLIC PNL TOTAL CA: CPT

## 2025-03-10 RX ORDER — CHLORTHALIDONE 25 MG/1
12.5 TABLET ORAL DAILY
Qty: 15 TABLET | Refills: 5 | Status: SHIPPED | OUTPATIENT
Start: 2025-03-10

## 2025-03-10 RX ADMIN — LABETALOL HYDROCHLORIDE 100 MG: 100 TABLET, FILM COATED ORAL at 08:16

## 2025-03-10 RX ADMIN — ENOXAPARIN SODIUM 40 MG: 40 INJECTION SUBCUTANEOUS at 08:16

## 2025-03-10 RX ADMIN — VERAPAMIL HYDROCHLORIDE 80 MG: 80 TABLET ORAL at 05:56

## 2025-03-10 RX ADMIN — LISINOPRIL 10 MG: 10 TABLET ORAL at 08:16

## 2025-03-10 RX ADMIN — ASPIRIN 81 MG: 81 TABLET, COATED ORAL at 08:16

## 2025-03-10 RX ADMIN — ACETAMINOPHEN 650 MG: 325 TABLET, FILM COATED ORAL at 11:15

## 2025-03-10 NOTE — ASSESSMENT & PLAN NOTE
Initial pressure of 169/106 recorded at 9 AM.  The pressure continued to stay elevated, crossing 180 systolic, and was over 200 systolic when we evaluated her (207/101). No findings of end organ damage as there are currently no symptoms of signs of damage. Labs including trops, creatinine, LFTs, CXR, are all WNL. At this point would call this hypertensive emergency that resolved as her chest pain and weakness/numbness resolved, and is now hypertensive urgency.   BP improved following morning.    Plan:  - Continue home regimen of labetalol 100 mg twice daily, Verapamil 80 mg TID,  trandolapril 4 mg qd   -Start chlorthalidone 12.5 mg daily outpatient BMP ordered in the next 1 to 14 days patient to follow-up with PCP

## 2025-03-10 NOTE — PLAN OF CARE
Problem: PAIN - ADULT  Goal: Verbalizes/displays adequate comfort level or baseline comfort level  Description: Interventions:  - Encourage patient to monitor pain and request assistance  - Assess pain using appropriate pain scale  - Administer analgesics based on type and severity of pain and evaluate response  - Implement non-pharmacological measures as appropriate and evaluate response  - Consider cultural and social influences on pain and pain management  - Notify physician/advanced practitioner if interventions unsuccessful or patient reports new pain  Outcome: Progressing     Problem: SAFETY ADULT  Goal: Patient will remain free of falls  Description: INTERVENTIONS:  - Educate patient/family on patient safety including physical limitations  - Instruct patient to call for assistance with activity   - Consult OT/PT to assist with strengthening/mobility   - Keep Call bell within reach  - Keep bed low and locked with side rails adjusted as appropriate  - Keep care items and personal belongings within reach  - Initiate and maintain comfort rounds  - Make Fall Risk Sign visible to staff  - Initiate/Maintain bed and chair alarms  - Apply yellow socks and bracelet for high fall risk patients  - Consider moving patient to room near nurses station  Outcome: Progressing  Goal: Maintain or return to baseline ADL function  Description: INTERVENTIONS:  -  Assess patient's ability to carry out ADLs; assess patient's baseline for ADL function and identify physical deficits which impact ability to perform ADLs (bathing, care of mouth/teeth, toileting, grooming, dressing, etc.)  - Assess/evaluate cause of self-care deficits   - Assess range of motion  - Assess patient's mobility; develop plan if impaired  - Assess patient's need for assistive devices and provide as appropriate  - Encourage maximum independence but intervene and supervise when necessary  - Involve family in performance of ADLs  - Assess for home care needs  following discharge   - Consider OT consult to assist with ADL evaluation and planning for discharge  - Provide patient education as appropriate  Outcome: Progressing  Goal: Maintains/Returns to pre admission functional level  Description: INTERVENTIONS:  - Perform AM-PAC 6 Click Basic Mobility/ Daily Activity assessment daily.  - Set and communicate daily mobility goal to care team and patient/family/caregiver.   - Collaborate with rehabilitation services on mobility goals if consulted  - Out of bed for toileting  - Record patient progress and toleration of activity level   Outcome: Progressing     Problem: DISCHARGE PLANNING  Goal: Discharge to home or other facility with appropriate resources  Description: INTERVENTIONS:  - Identify barriers to discharge w/patient and caregiver  - Arrange for needed discharge resources and transportation as appropriate  - Identify discharge learning needs (meds, wound care, etc.)  - Arrange for interpretive services to assist at discharge as needed  - Refer to Case Management Department for coordinating discharge planning if the patient needs post-hospital services based on physician/advanced practitioner order or complex needs related to functional status, cognitive ability, or social support system  Outcome: Progressing     Problem: NEUROSENSORY - ADULT  Goal: Achieves stable or improved neurological status  Description: INTERVENTIONS  - Monitor and report changes in neurological status  - Monitor vital signs such as temperature, blood pressure, glucose, and any other labs ordered   - Initiate measures to prevent increased intracranial pressure  - Monitor for seizure activity and implement precautions if appropriate      Outcome: Progressing  Goal: Remains free of injury related to seizures activity  Description: INTERVENTIONS  - Maintain airway, patient safety  and administer oxygen as ordered  - Monitor patient for seizure activity, document and report duration and  description of seizure to physician/advanced practitioner  - If seizure occurs,  ensure patient safety during seizure  - Reorient patient post seizure  - Seizure pads on all 4 side rails  - Instruct patient/family to notify RN of any seizure activity including if an aura is experienced  - Instruct patient/family to call for assistance with activity based on nursing assessment  - Administer anti-seizure medications if ordered    Outcome: Progressing  Goal: Achieves maximal functionality and self care  Description: INTERVENTIONS  - Monitor swallowing and airway patency with patient fatigue and changes in neurological status  - Encourage and assist patient to increase activity and self care.   - Encourage visually impaired, hearing impaired and aphasic patients to use assistive/communication devices  Outcome: Progressing     Problem: CARDIOVASCULAR - ADULT  Goal: Maintains optimal cardiac output and hemodynamic stability  Description: INTERVENTIONS:  - Monitor I/O, vital signs and rhythm  - Monitor for S/S and trends of decreased cardiac output  - Administer and titrate ordered vasoactive medications to optimize hemodynamic stability  - Assess quality of pulses, skin color and temperature  - Assess for signs of decreased coronary artery perfusion  - Instruct patient to report change in severity of symptoms  Outcome: Progressing  Goal: Absence of cardiac dysrhythmias or at baseline rhythm  Description: INTERVENTIONS:  - Continuous cardiac monitoring, vital signs, obtain 12 lead EKG if ordered  - Administer antiarrhythmic and heart rate control medications as ordered  - Monitor electrolytes and administer replacement therapy as ordered  Outcome: Progressing     Problem: RESPIRATORY - ADULT  Goal: Achieves optimal ventilation and oxygenation  Description: INTERVENTIONS:  - Assess for changes in respiratory status  - Assess for changes in mentation and behavior  - Position to facilitate oxygenation and minimize respiratory  effort  - Oxygen administered by appropriate delivery if ordered  - Initiate smoking cessation education as indicated  - Encourage broncho-pulmonary hygiene including cough, deep breathe, Incentive Spirometry  - Assess the need for suctioning and aspirate as needed  - Assess and instruct to report SOB or any respiratory difficulty  - Respiratory Therapy support as indicated  Outcome: Progressing     Problem: METABOLIC, FLUID AND ELECTROLYTES - ADULT  Goal: Electrolytes maintained within normal limits  Description: INTERVENTIONS:  - Monitor labs and assess patient for signs and symptoms of electrolyte imbalances  - Administer electrolyte replacement as ordered  - Monitor response to electrolyte replacements, including repeat lab results as appropriate  - Instruct patient on fluid and nutrition as appropriate  Outcome: Progressing  Goal: Fluid balance maintained  Description: INTERVENTIONS:  - Monitor labs   - Monitor I/O and WT  - Instruct patient on fluid and nutrition as appropriate  - Assess for signs & symptoms of volume excess or deficit  Outcome: Progressing  Goal: Glucose maintained within target range  Description: INTERVENTIONS:  - Monitor Blood Glucose as ordered  - Assess for signs and symptoms of hyperglycemia and hypoglycemia  - Administer ordered medications to maintain glucose within target range  - Assess nutritional intake and initiate nutrition service referral as needed  Outcome: Progressing     Problem: SKIN/TISSUE INTEGRITY - ADULT  Goal: Skin Integrity remains intact(Skin Breakdown Prevention)  Description: Assess:  -Clean and moisturize skin as needed  -Inspect skin when repositioning, toileting, and assisting with ADLS  -Assess extremities for adequate circulation and sensation     Bed Management:  -Have minimal linens on bed & keep smooth, unwrinkled  -Change linens as needed when moist or perspiring  -Avoid sitting or lying in one position for more than 2 hours while in  bed    Toileting:  -Offer bedside commode    Activity:  -Encourage activity and walks on unit  -Encourage or provide ROM exercises   -Turn and reposition patient every 2 Hours  -Use appropriate equipment to lift or move patient in bed  -Instruct/ Assist with weight shifting every 2 when out of bed in chair      Skin Care:  -Avoid use of baby powder, tape, friction and shearing, hot water or constrictive clothing  -Do not massage red bony areas    Next Steps:  -Teach patient strategies to minimize risks such as pressure injuries and DVT   Outcome: Progressing  Goal: Incision(s), wounds(s) or drain site(s) healing without S/S of infection  Description: INTERVENTIONS  - Assess and document dressing, incision, wound bed, drain sites and surrounding tissue  - Provide patient and family education    Outcome: Progressing  Goal: Pressure injury heals and does not worsen  Description: Interventions:  - Implement low air loss mattress or specialty surface (Criteria met)  - Apply silicone foam dressing  - Instruct/assist with weight shifting every 120 minutes when in chair   - Turn and reposition patient & offload bony prominences every 2 hours   - Utilize friction reducing device or surface for transfers   - Consider nutrition services referral as needed  Outcome: Progressing     Problem: MUSCULOSKELETAL - ADULT  Goal: Maintain or return mobility to safest level of function  Description: INTERVENTIONS:  - Assess patient's ability to carry out ADLs; assess patient's baseline for ADL function and identify physical deficits which impact ability to perform ADLs (bathing, care of mouth/teeth, toileting, grooming, dressing, etc.)  - Assess/evaluate cause of self-care deficits   - Assess range of motion  - Assess patient's mobility  - Assess patient's need for assistive devices and provide as appropriate  - Encourage maximum independence but intervene and supervise when necessary  - Involve family in performance of ADLs  - Assess  for home care needs following discharge   - Consider OT consult to assist with ADL evaluation and planning for discharge  - Provide patient education as appropriate  Outcome: Progressing  Goal: Maintain proper alignment of affected body part  Description: INTERVENTIONS:  - Support, maintain and protect limb and body alignment  - Provide patient/ family with appropriate education  Outcome: Progressing

## 2025-03-10 NOTE — RESTORATIVE TECHNICIAN NOTE
Restorative Technician Note      Patient Name: Amy Can     Note Type: Mobility  Patient Position Upon Consult: Supine  Activity Performed: Ambulated; Stood; Dangled  Assistive Device: Other (Comment) (none)  Education Provided: Yes  Patient Position at End of Consult: Supine; All needs within reach; Bed/Chair alarm activated    Ashley FLYNN, Restorative Technician,

## 2025-03-10 NOTE — DISCHARGE INSTR - AVS FIRST PAGE
Start taking chlorthalidone 12.5 mg in the morning  Get lab work in 1 week send follow-up with Dr. Griffith in the next 7 to 14 days for follow-up of your blood pressure  Monitor blood pressure at home call PCP if BP greater than 160

## 2025-03-10 NOTE — TELEPHONE ENCOUNTER
Patient called just discharged from [facility] 03/10/2025 with TIA aileen is on Friday at 2:30 with Dr pearson for TCM

## 2025-03-10 NOTE — DISCHARGE SUMMARY
Discharge Summary - Internal Medicine   Name: Amy Can 69 y.o. female I MRN: [id number]  Unit/Bed#: [facility] 711-01 I Date of Admission: 3/7/2025   Date of Service: 3/10/2025 I Hospital Day: 2        Amy Can   69 y.o. female  MRN: [id number]  Room/Bed: [facility] 711/[facility] 711-01     [facility] 7   Encounter: [id number]    Principal Problem:    Hypertensive emergency  Active Problems:    Migraine with aura and without status migrainosus, not intractable    Aortic dilatation (HCC)    Stroke-like symptoms    TIA (transient ischemic attack)      * Hypertensive emergency  Assessment & Plan  Initial pressure of 169/106 recorded at 9 AM.  The pressure continued to stay elevated, crossing 180 systolic, and was over 200 systolic when we evaluated her (207/101). No findings of end organ damage as there are currently no symptoms of signs of damage. Labs including trops, creatinine, LFTs, CXR, are all WNL. At this point would call this hypertensive emergency that resolved as her chest pain and weakness/numbness resolved, and is now hypertensive urgency.   BP improved following morning.     Plan:  - Continue home regimen of labetalol 100 mg twice daily, Verapamil 80 mg TID,  trandolapril 4 mg qd   -Start chlorthalidone 12.5 mg daily outpatient BMP ordered in the next 1 to 14 days patient to follow-up with PCP    Stroke-like symptoms  Assessment & Plan  Initial pressure of 169/106 recorded at 9 AM.  The pressure continued to stay elevated, crossing 180 systolic, and was over 200 systolic when we evaluated her (207/101). No findings of end organ damage as there are currently no symptoms of signs of damage. Labs including trops, creatinine, LFTs, CXR, are all WNL. At this point would call this hypertensive emergency that resolved as her chest pain and weakness/numbness resolved, and is now hypertensive urgency.   BP improved following morning.    Plan:  - Continue home regimen of  labetalol 100 mg twice daily, Verapamil 80 mg TID,  trandolapril 4 mg qd   -Start chlorthalidone 12.5 mg daily outpatient BMP ordered in the next 1 to 14 days patient to follow-up with PCP    Aortic dilatation (HCC)  Assessment & Plan  Ectatic proximal ascending aorta measuring 43 mm in diameter    Plan:  - Follow-up CTA chest with a 1 year    Migraine with aura and without status migrainosus, not intractable  Assessment & Plan  Acute episodic migraine for which patient takes home protriptyline 5 mg nightly    Plan:  Continue home medication        DETAILS OF HOSPITAL COURSE     Eve Can is a 69-year-old woman with past medical history of aortic regurgitation, hypertension, hyperlipidemia, and episodic migraine presents with left-sided weakness (face, arm and leg) and chest pain. Lasting 15 upon arrival to ED; chest pain and left sided weakness/numbness resolved. BP during admission with SBP 200s. Initial CMP without CHERRY, LFT abnormalities, troponin x2 negative, TSH wnl, CTA: no large vessel occlusion, ectatic proximal ascending aorta measuring 43 mm, MRI brain: negative, echo: LV with moderate hypertrophy EF 55%, G1DD, moderate AR.  Treated for Hypertensive emergency: Resumed home medication and started on chlorthalidone 12.5 mg every morning;  at time of discharge.     Patient seen and examined. No overnight events. Pt's SBP improved to 140s. Pt denies headache, blurred vision, SOB, chest pain, or additional complaints.     Vitals:    03/10/25 1131   BP: 141/79   Pulse: 85   Resp: 18   Temp: 97.5 °F (36.4 °C)   SpO2: 93%     Physical Exam  Vitals and nursing note reviewed.   Constitutional:       General: She is not in acute distress.     Appearance: She is well-developed. She is obese.   HENT:      Head: Normocephalic and atraumatic.   Eyes:      Conjunctiva/sclera: Conjunctivae normal.   Cardiovascular:      Rate and Rhythm: Normal rate and regular rhythm.      Heart sounds: No murmur  heard.  Pulmonary:      Effort: Pulmonary effort is normal. No respiratory distress.      Breath sounds: Normal breath sounds.   Abdominal:      Palpations: Abdomen is soft.      Tenderness: There is no abdominal tenderness.   Musculoskeletal:         General: No swelling.      Cervical back: Neck supple.   Skin:     General: Skin is warm and dry.      Capillary Refill: Capillary refill takes less than 2 seconds.   Neurological:      Mental Status: She is alert.   Psychiatric:         Mood and Affect: Mood normal.          DISCHARGE INFORMATION     PCP at Discharge: Ross Griffith MD    Admitting Provider: Kathleen Jesus DO  Admission Date: 3/7/2025    Discharge Provider: Kathleen Jesus DO  Discharge Date: 03/10/25      Discharge Disposition: Home/Self Care  Discharge Condition: stable  Discharge with Lines: no    Discharge Diet: cardiac diet  Activity Restrictions: none  Test Results Pending at Discharge: n/a    Discharge Diagnoses:  Principal Problem:    Hypertensive emergency  Active Problems:    Migraine with aura and without status migrainosus, not intractable    Aortic dilatation (HCC)    Stroke-like symptoms    TIA (transient ischemic attack)  Resolved Problems:    * No resolved hospital problems. *      Consulting Providers:      Diagnostic & Therapeutic Procedures Performed:  MRI brain wo contrast  Result Date: 3/9/2025  Impression: No acute infarct, significant mass effect or midline shift. Workstation performed: [id number]     XR chest 2 views  Result Date: 3/7/2025  Impression: No radiographic evidence of acute intrathoracic process. Workstation performed: [id number]     CTA head and neck with and without contrast  Result Date: 3/7/2025  Impression: 1.  No acute intracranial abnormality. 2.  No large vessel occlusion in the head or neck. 3.  Ectatic proximal ascending aorta measuring 43 mm in diameter. Follow-up CTA chest within 1 year is suggested. 4.  Right sphenoid sinus mucosal disease as  above, correlate clinically for signs and symptoms of acute sinusitis. Workstation performed: [id number]       Code Status: Level 3 - DNAR and DNI  Advance Directive & Living Will: <no information>  Power of :    POLST:      Medications:  Current Discharge Medication List        STOP taking these medications       chlorhexidine (PERIDEX) 0.12 % solution Comments:   Reason for Stopping:             Current Discharge Medication List        START taking these medications    Details   chlorthalidone 25 mg tablet Take 0.5 tablets (12.5 mg total) by mouth daily  Qty: 15 tablet, Refills: 5    Associated Diagnoses: Essential hypertension           Current Discharge Medication List        CONTINUE these medications which have NOT CHANGED    Details   acetaminophen (TYLENOL) 650 mg CR tablet Take 1 tablet (650 mg total) by mouth every 8 (eight) hours as needed for mild pain  Qty: 30 tablet, Refills: 0    Associated Diagnoses: Trochanteric bursitis of right hip      Calcium 600 MG tablet Take 2 tablets by mouth daily      calcium citrate-vitamin D (CITRACAL+D) 315-200 MG-UNIT per tablet Take 1 tablet by mouth 2 (two) times a day      cholecalciferol (VITAMIN D3) 1,000 units tablet Take 1,000 Units by mouth daily 5,000 units daily twice a day      cyanocobalamin (VITAMIN B-12) 500 mcg tablet Take 1 tablet by mouth daily      labetalol (NORMODYNE) 100 mg tablet take 1 tablet by mouth twice a day  Qty: 180 tablet, Refills: 1    Comments: Requesting 1 year supply  Associated Diagnoses: Essential hypertension      Multiple Vitamins-Minerals (MULTIVITAMIN ADULT) TABS Take 1 tablet by mouth daily      Prolensa 0.07 % SOLN       protriptyline (VIVACTIL) 5 MG tablet take 1 tablet by mouth at bedtime  Qty: 90 tablet, Refills: 1    Associated Diagnoses: Migraine with aura and without status migrainosus, not intractable      rosuvastatin (CRESTOR) 5 mg tablet Take 1 tablet (5 mg total) by mouth daily  Qty: 90 tablet, Refills: 1     "Associated Diagnoses: Mixed hyperlipidemia      trandolapril (MAVIK) 4 MG tablet take 1 tablet by mouth once daily  Qty: 90 tablet, Refills: 1    Associated Diagnoses: Essential hypertension      verapamil (CALAN) 80 mg tablet take 1 tablet by mouth three times a day  Qty: 270 tablet, Refills: 1    Associated Diagnoses: Essential hypertension             Allergies:  Allergies   Allergen Reactions   • Penicillins Rash     Other reaction(s): PENICILLIN G POTASSIUM (PENICILLIN)         FOLLOW-UP     PCP Outpatient Follow-up:  Yes, in 7-14 days to f/up blood pressure, kidney function, needs repeat CTA in one year for aortic ectasia    Consulting Providers Follow-up:  none required     Active Issues Requiring Follow-up:   f/up blood pressure, kidney function, needs repeat CTA in one year for aortic ectasia    Discharge Statement:   I spent 1 hour minutes discharging the patient. This time was spent on the day of discharge. I had direct contact with the patient on the day of discharge. Additional documentation is required if more than 30 minutes were spent on discharge.    Portions of the record may have been created with voice recognition software.  Occasional wrong word or \"sound a like\" substitutions may have occurred due to the inherent limitations of voice recognition software.  Read the chart carefully and recognize, using context, where substitutions have occurred.    ==  Alberta Bryant,   [facility]  Internal Medicine Resident PGY-3    "

## 2025-03-10 NOTE — ASSESSMENT & PLAN NOTE
Acute episodic migraine for which patient takes home protriptyline 5 mg nightly    Plan:  Continue home medication

## 2025-03-10 NOTE — CASE MANAGEMENT
Case Management Discharge Planning Note    Patient name Amy Can  Location [facility] 711/[facility] 711-01 MRN [id number]  : 1955 Date 3/10/2025       Current Admission Date: 3/7/2025  Current Admission Diagnosis:Hypertensive emergency   Patient Active Problem List    Diagnosis Date Noted Date Diagnosed    Hypertensive emergency 03/10/2025     Stroke-like symptoms 2025     TIA (transient ischemic attack) 2025     History of solitary pulmonary nodule 2023     Obesity, morbid (HCC) 2021     Vitamin D deficiency 2021     Essential hypertension      Hyperlipidemia      Obesity (BMI 30.0-34.9)      Migraine with aura and without status migrainosus, not intractable 2021     Aortic dilatation (HCC) 2021     Nonrheumatic aortic (valve) insufficiency      Sleep apnea      Vitamin B12 deficiency        LOS (days): 2  Geometric Mean LOS (GMLOS) (days): 2.1  Days to GMLOS:0     OBJECTIVE:  Risk of Unplanned Readmission Score: 10.01         Current admission status: Inpatient   Preferred Pharmacy:   [id number] - [place], [place] - [address]  [address]  [place] [address]  Phone: [phone] Fax: [phone]    [facility] - [address] [address]  Phone: [phone] Fax: [phone]    Primary Care Provider: Ross Griffith MD    Primary Insurance: [employer] REP  Secondary Insurance:     DISCHARGE DETAILS:    Discharge planning discussed with:: Patient        IMM Given (Date):: 03/10/25  IMM Given to:: Patient  IMM reviewed with patient, patient agrees with discharge determination.      Additional Comments: Patient for discharge today- denies CM needs.   will transport patient home.

## 2025-03-10 NOTE — TELEPHONE ENCOUNTER
STILL ADMITTED:3/7/2025 - present (3 days)  [facility]      1ST ATTEMPT,     VIA [employer]     Thank you,     Fadia SAUNDERS/ MAXINE MARIAN/ TIA    DC-       ----- Message from Seymour Deutsch DO sent at 3/9/2025  5:54 PM EDT -----  Regarding: HFDONTA Can will need follow-up in in 6 weeks with neurovascular team attending / AP for TIA in 60 minute appointment. They will not require outpatient neurological testing. Thank you

## 2025-03-14 ENCOUNTER — OFFICE VISIT (OUTPATIENT)
Dept: INTERNAL MEDICINE CLINIC | Facility: CLINIC | Age: 70
End: 2025-03-14
Payer: COMMERCIAL

## 2025-03-14 VITALS
OXYGEN SATURATION: 95 % | TEMPERATURE: 97.7 F | DIASTOLIC BLOOD PRESSURE: 82 MMHG | HEART RATE: 87 BPM | BODY MASS INDEX: 39.11 KG/M2 | HEIGHT: 65 IN | SYSTOLIC BLOOD PRESSURE: 130 MMHG

## 2025-03-14 DIAGNOSIS — E66.01 OBESITY, MORBID (HCC): ICD-10-CM

## 2025-03-14 DIAGNOSIS — G45.9 TIA (TRANSIENT ISCHEMIC ATTACK): Primary | ICD-10-CM

## 2025-03-14 DIAGNOSIS — G43.109 MIGRAINE WITH AURA AND WITHOUT STATUS MIGRAINOSUS, NOT INTRACTABLE: ICD-10-CM

## 2025-03-14 DIAGNOSIS — I10 ESSENTIAL HYPERTENSION: ICD-10-CM

## 2025-03-14 DIAGNOSIS — I35.1 NONRHEUMATIC AORTIC (VALVE) INSUFFICIENCY: ICD-10-CM

## 2025-03-14 PROCEDURE — 99496 TRANSJ CARE MGMT HIGH F2F 7D: CPT | Performed by: INTERNAL MEDICINE

## 2025-03-14 RX ORDER — ASPIRIN 81 MG/1
81 TABLET, CHEWABLE ORAL DAILY
Start: 2025-03-14

## 2025-03-14 NOTE — ASSESSMENT & PLAN NOTE
Orders:    Ambulatory Referral to Cardiology; Future    Basic metabolic panel; Future    Magnesium; Future

## 2025-03-14 NOTE — PROGRESS NOTES
Transition of Care Visit  Name: Amy Can      : 1955      MRN: [id number]  Encounter Provider: Ross Griffith MD  Encounter Date: 3/14/2025   Encounter department: [facility] INTERNAL MEDICINE    Assessment & Plan  TIA (transient ischemic attack)    Orders:    Ambulatory Referral to Neurology; Future    aspirin 81 mg chewable tablet; Chew 1 tablet (81 mg total) daily    Essential hypertension    Orders:    Ambulatory Referral to Cardiology; Future    Basic metabolic panel; Future    Magnesium; Future    Migraine with aura and without status migrainosus, not intractable         Nonrheumatic aortic (valve) insufficiency         Obesity, morbid (HCC)                History of Present Illness     Transitional Care Management Review:   Amy Can is a 69 y.o. female here for TCM follow up.     During the TCM phone call patient stated:  TCM Call (since 2025)       Date and time call was made  3/10/2025  3:01 PM    Hospital care reviewed  Records reviewed    Patient was hospitialized at  [facility]    Date of Admission  25    Date of discharge  03/10/25    Diagnosis  HTN emergency    Disposition  Home    Current Symptoms  None          TCM Call (since 2025)       Post hospital issues  None    Scheduled for follow up?  Yes    Did you obtain your prescribed medications  Yes    Do you need help managing your prescriptions or medications  No    Is transportation to your appointment needed  No    I have advised the patient to call PCP with any new or worsening symptoms  [facility]          HPI  Review of Systems   Constitutional:  Negative for appetite change, chills, fatigue and fever.   HENT:  Negative for sore throat and trouble swallowing.    Eyes:  Negative for redness.   Respiratory:  Negative for shortness of breath.    Cardiovascular:  Negative for chest pain and palpitations.   Gastrointestinal:  Negative for abdominal pain, constipation and diarrhea.  "  Genitourinary:  Negative for dysuria and hematuria.   Musculoskeletal:  Negative for back pain and neck pain.   Skin:  Negative for rash.   Neurological:  Negative for seizures, weakness and headaches.   Hematological:  Negative for adenopathy.   Psychiatric/Behavioral:  Negative for confusion. The patient is not nervous/anxious.      Objective   /82 (Patient Position: Sitting, Cuff Size: Adult)   Pulse 87   Temp 97.7 °F (36.5 °C) (Temporal)   Ht 5' 5\" (1.651 m)   SpO2 95%   BMI 39.11 kg/m²     Physical Exam  Vitals and nursing note reviewed.   Constitutional:       General: She is not in acute distress.     Appearance: She is well-developed.   HENT:      Head: Normocephalic and atraumatic.   Eyes:      Conjunctiva/sclera: Conjunctivae normal.   Cardiovascular:      Rate and Rhythm: Normal rate and regular rhythm.      Heart sounds: Murmur heard.   Pulmonary:      Effort: Pulmonary effort is normal. No respiratory distress.      Breath sounds: Normal breath sounds.   Abdominal:      Palpations: Abdomen is soft.      Tenderness: There is no abdominal tenderness.   Musculoskeletal:         General: No swelling.      Cervical back: Normal range of motion and neck supple.   Skin:     General: Skin is warm and dry.      Capillary Refill: Capillary refill takes less than 2 seconds.   Neurological:      Mental Status: She is alert.   Psychiatric:         Mood and Affect: Mood normal.       Medications have been reviewed by provider in current encounter      "

## 2025-03-17 NOTE — TELEPHONE ENCOUNTER
03/17/25    Patient called office today to schedule her HFU Appt.    Offered Next Available and to be placed on the waiting as High Priority due that appt is an HFU.    Patient Accepted.    HFU Appt Scheduled for 05/15/25, 10:00 AM With [place] at [facility].   Appt Placed on the waiting list.      Silva questions, please contact Patient.  Thank You.

## 2025-03-26 LAB
BUN SERPL-MCNC: 26 MG/DL (ref 7–25)
BUN/CREAT SERPL: 24 (CALC) (ref 6–22)
CALCIUM SERPL-MCNC: 9.3 MG/DL (ref 8.6–10.4)
CHLORIDE SERPL-SCNC: 102 MMOL/L (ref 98–110)
CO2 SERPL-SCNC: 26 MMOL/L (ref 20–32)
CREAT SERPL-MCNC: 1.1 MG/DL (ref 0.6–1)
GFR/BSA.PRED SERPLBLD CYS-BASED-ARV: 54 ML/MIN/1.73M2
GLUCOSE SERPL-MCNC: 97 MG/DL (ref 65–99)
MAGNESIUM SERPL-MCNC: 2.1 MG/DL (ref 1.5–2.5)
POTASSIUM SERPL-SCNC: 4 MMOL/L (ref 3.5–5.3)
SODIUM SERPL-SCNC: 138 MMOL/L (ref 135–146)

## 2025-05-02 ENCOUNTER — OFFICE VISIT (OUTPATIENT)
Dept: INTERNAL MEDICINE CLINIC | Facility: CLINIC | Age: 70
End: 2025-05-02
Payer: COMMERCIAL

## 2025-05-02 VITALS
SYSTOLIC BLOOD PRESSURE: 134 MMHG | BODY MASS INDEX: 39.49 KG/M2 | WEIGHT: 237 LBS | TEMPERATURE: 98.6 F | DIASTOLIC BLOOD PRESSURE: 78 MMHG | HEART RATE: 79 BPM | OXYGEN SATURATION: 95 % | HEIGHT: 65 IN

## 2025-05-02 DIAGNOSIS — Z00.00 MEDICARE ANNUAL WELLNESS VISIT, SUBSEQUENT: ICD-10-CM

## 2025-05-02 DIAGNOSIS — E78.2 MIXED HYPERLIPIDEMIA: ICD-10-CM

## 2025-05-02 DIAGNOSIS — I77.819 AORTIC DILATATION (HCC): ICD-10-CM

## 2025-05-02 DIAGNOSIS — Z12.11 SCREEN FOR COLON CANCER: ICD-10-CM

## 2025-05-02 DIAGNOSIS — I35.1 NONRHEUMATIC AORTIC (VALVE) INSUFFICIENCY: Primary | ICD-10-CM

## 2025-05-02 DIAGNOSIS — E66.2 MORBID OBESITY WITH ALVEOLAR HYPOVENTILATION (HCC): ICD-10-CM

## 2025-05-02 DIAGNOSIS — I10 ESSENTIAL HYPERTENSION: ICD-10-CM

## 2025-05-02 DIAGNOSIS — Z87.891 HISTORY OF SMOKING 30 OR MORE PACK YEARS: ICD-10-CM

## 2025-05-02 DIAGNOSIS — L98.9 SKIN LESION: ICD-10-CM

## 2025-05-02 PROCEDURE — 99214 OFFICE O/P EST MOD 30 MIN: CPT | Performed by: INTERNAL MEDICINE

## 2025-05-02 PROCEDURE — G0439 PPPS, SUBSEQ VISIT: HCPCS | Performed by: INTERNAL MEDICINE

## 2025-05-02 NOTE — PROGRESS NOTES
Name: Amy Can      : 1955      MRN: [id number]  Encounter Provider: Ross Griffith MD  Encounter Date: 2025   Encounter department: [facility] INTERNAL MEDICINE  :  Assessment & Plan  Nonrheumatic aortic (valve) insufficiency  Stable following with cardiology.       Skin lesion  Referred to dermatologist and she will be seen today.       Morbid obesity with alveolar hypoventilation (HCC)           Essential hypertension    Orders:    Uric acid; Future    Magnesium; Future    Basic metabolic panel; Future    Mixed hyperlipidemia         Aortic dilatation (HCC)         History of smoking 30 or more pack years         Screen for colon cancer    Orders:    Ambulatory Referral to Gastroenterology; Future    Medicare annual wellness visit, subsequent            Preventive health issues were discussed with patient, and age appropriate screening tests were ordered as noted in patient's After Visit Summary. Personalized health advice and appropriate referrals for health education or preventive services given if needed, as noted in patient's After Visit Summary.    History of Present Illness     HPI   Patient Care Team:  Ross Griffith MD as PCP - General (Internal Medicine)  Ross Griffith MD as PCP - PCP-[facility] ([facility]  MD Deyvi Mazariegos MD    Review of Systems   Constitutional:  Negative for appetite change, chills, fatigue and fever.   HENT:  Negative for sore throat and trouble swallowing.    Eyes:  Negative for redness.   Respiratory:  Negative for shortness of breath.    Cardiovascular:  Negative for chest pain and palpitations.   Gastrointestinal:  Negative for abdominal pain, constipation and diarrhea.   Genitourinary:  Negative for dysuria and hematuria.   Musculoskeletal:  Negative for back pain and neck pain.   Skin:  Negative for rash.   Neurological:  Negative for seizures, weakness and headaches.   Hematological:  Negative for adenopathy.    Psychiatric/Behavioral:  Negative for confusion. The patient is not nervous/anxious.      Medical History Reviewed by provider this encounter:       Annual Wellness Visit Questionnaire   Amy is here for her Subsequent Wellness visit. Last Medicare Wellness visit information reviewed, patient interviewed and updates made to the record today.      Health Risk Assessment:   Patient rates overall health as good. Patient feels that their physical health rating is slightly worse. Patient is very satisfied with their life. Eyesight was rated as same. Hearing was rated as same. Patient feels that their emotional and mental health rating is same. Patients states they are never, rarely angry. Patient states they are sometimes unusually tired/fatigued. Pain experienced in the last 7 days has been some. Patient's pain rating has been 3/10. Patient states that she has experienced no weight loss or gain in last 6 months.     Depression Screening:   PHQ-2 Score: 0      Fall Risk Screening:   In the past year, patient has experienced: no history of falling in past year      Urinary Incontinence Screening:   Patient has not leaked urine accidently in the last six months.     Home Safety:  Patient does not have trouble with stairs inside or outside of their home. Patient has working smoke alarms and has no working carbon monoxide detector. Home safety hazards include: none.     Nutrition:   Current diet is Low Carb, No Added Salt and Limited junk food.     Medications:   Patient is currently taking over-the-counter supplements. OTC medications include: see medication list. Patient is able to manage medications.     Activities of Daily Living (ADLs)/Instrumental Activities of Daily Living (IADLs):   Walk and transfer into and out of bed and chair?: Yes  Dress and groom yourself?: Yes    Bathe or shower yourself?: Yes    Feed yourself? Yes  Do your laundry/housekeeping?: Yes  Manage your money, pay your bills and track your  expenses?: Yes  Make your own meals?: Yes    Do your own shopping?: Yes    Previous Hospitalizations:   Any hospitalizations or ED visits within the last 12 months?: Yes    How many hospitalizations have you had in the last year?: 1-2    Advance Care Planning:   Living will: Yes    Durable POA for healthcare: No    Advanced directive: Yes      Preventive Screenings      Cardiovascular Screening:    General: Screening Not Indicated and History Lipid Disorder      Diabetes Screening:     General: Screening Current      Colorectal Cancer Screening:     General: Screening Current      Breast Cancer Screening:     General: Screening Current      Cervical Cancer Screening:    General: Screening Not Indicated      Lung Cancer Screening:     General: Screening Not Indicated    Immunizations:  - Immunizations due: Zoster (Shingrix)    Screening, Brief Intervention, and Referral to Treatment (SBIRT)     Screening  Typical number of drinks in a day: 0  Typical number of drinks in a week: 0  Interpretation: Low risk drinking behavior.    AUDIT-C Screenin) How often did you have a drink containing alcohol in the past year? monthly or less  2) How many drinks did you have on a typical day when you were drinking in the past year? 1 to 2  3) How often did you have 6 or more drinks on one occasion in the past year? never    AUDIT-C Score: 1  Interpretation: Score 0-2 (female): Negative screen for alcohol misuse    Single Item Drug Screening:  How often have you used an illegal drug (including marijuana) or a prescription medication for non-medical reasons in the past year? never    Single Item Drug Screen Score: 0  Interpretation: Negative screen for possible drug use disorder    Other Counseling Topics:   Car/seat belt/driving safety, skin self-exam, sunscreen and calcium and vitamin D intake and regular weightbearing exercise.     Social Drivers of Health     Financial Resource Strain: Low Risk  (2023)    Overall  "Financial Resource Strain (CARDIA)     Difficulty of Paying Living Expenses: Not hard at all   Food Insecurity: No Food Insecurity (5/1/2025)    Hunger Vital Sign     Worried About Running Out of Food in the Last Year: Never true     Ran Out of Food in the Last Year: Never true   Transportation Needs: No Transportation Needs (5/1/2025)    PRAPARE - Transportation     Lack of Transportation (Medical): No     Lack of Transportation (Non-Medical): No   Housing Stability: Low Risk  (5/1/2025)    Housing Stability Vital Sign     Unable to Pay for Housing in the Last Year: No     Number of Times Moved in the Last Year: 0     Homeless in the Last Year: No   Utilities: Not At Risk (5/1/2025)    [facility] Utilities     Threatened with loss of utilities: No     No results found.    Objective   /78 (BP Location: Left arm, Patient Position: Sitting, Cuff Size: Large)   Pulse 79   Temp 98.6 °F (37 °C) (Temporal)   Ht 5' 5\" (1.651 m)   Wt 108 kg (237 lb)   SpO2 95%   BMI 39.44 kg/m²     Physical Exam  Vitals and nursing note reviewed.   Constitutional:       General: She is not in acute distress.     Appearance: She is well-developed.   HENT:      Head: Normocephalic and atraumatic.   Eyes:      Conjunctiva/sclera: Conjunctivae normal.   Cardiovascular:      Rate and Rhythm: Normal rate and regular rhythm.      Heart sounds: Murmur heard.   Pulmonary:      Effort: Pulmonary effort is normal. No respiratory distress.      Breath sounds: Normal breath sounds.   Abdominal:      Palpations: Abdomen is soft.      Tenderness: There is no abdominal tenderness.   Musculoskeletal:         General: No swelling.      Cervical back: Neck supple.   Skin:     General: Skin is warm and dry.      Capillary Refill: Capillary refill takes less than 2 seconds.      Findings: Lesion present.   Neurological:      Mental Status: She is alert.   Psychiatric:         Mood and Affect: Mood normal.         "

## 2025-05-09 NOTE — PATIENT INSTRUCTIONS
Stroke:    Additional testing/referrals:  -carotid ultrasound around Sept 2025    Recommendations:   - For ongoing stroke prevention continue: aspirin and statin.  - Discussed the importance of antiplatelet/anticoagulant management with the patient to prevent future strokes.   - Recommend to check blood pressure occasionally away from the doctor's office to make sure that those numbers are typically less than 130/80.  If they are frequently higher than that, we recommend checking a little more often and to follow up with primary care team.  -Continue all medication for blood pressure control as prescribed be your primary care team/cardiologist.   - Will defer to primary care team for monitoring of cholesterol panel and blood sugar numbers with target LDL cholesterol of less than 70 and hemoglobin A1c less than 7%.  - Recommend following a low salt, mediterranean diet.   - Recommend routine physical exercise as tolerated.  - Avoid using NSAIDs (like ibuprofen, motrin, or aleve) for headaches or other pain and to use tylenol if needed.     We will plan for you to return to the office in 5 months to see myself or MD, but would be happy to see you sooner if the need should arise.  If you have any symptoms concerning for TIA or stroke including: sudden painless loss of vision or double vision, difficulty speaking or swallowing, vertigo/room spinning that does not quickly resolve, or weakness/numbness/loss of coordination affecting 1 side of the face or body, you should proceed by ambulance to the nearest emergency room immediately.

## 2025-05-09 NOTE — PROGRESS NOTES
Name: Amy Can      : 1955      MRN: [id number]  Encounter Provider: LUCILLE Goode  Encounter Date: 5/15/2025   Encounter department: [facility]  :  Assessment & Plan  history of right cerebellar infarcts  Presented to the hospital on 3/7/25 after an episode of sudden onset sharp chest pain, followed by L sided numbness, tingling, and lightheadedness. The chest pain resolved in 15 minutes after sitting. The numbness lasted 20 minutes and then gradually resolved over an additional 25 min. MRI brain completed, no acute intracranial abnormalities seen. Troponins negative. Due to positive sxs such as tingling & various other reported complaints, less likely TIA & more likely MSK vs HTN related in setting of pain / anxiety w/o recent hx of HTN noted. She denies any new strokelike symptoms since the hospital.  During her most recent hospitalization no acute infarcts were found but she was noted to have chronic infarcts in the right cerebellum which were found incidentally.  She was unaware of this.  Patient does report ongoing issues with short term memory impairment (prior to hospitalization but worse since most recent stay), lightheadedness/dizziness (prior to hospitalization), issues projecting words (since recent hospitalization).  She denies any anxiety or stress. Her sleep is not the best, only getting about 6 hours per night.  She reports frequent wakenings but she does treat her MELISSA and is compliant with wearing her CPAP machine. [place] in the office today .  She denies any unsafe behaviors, getting lost, difficulty with any ADLs or IADls at this time.  She reports ongoing chest pain and tightness along with shortness of breath with exertion. Chest XR in the hospital was clear.  She does follow with cardiology for aortic valve insufficiency.  She also had questions in regard to an EKG that was completed in the ED. I have recommended for her to direct those  questions and concerns to her cardiologist.   LDL remains just slightly above goal.  She takes crestor 5mg daily.  She is going to be getting repeat labs in a couple months and it can be reassessed then. Blood pressure in office today was great but reports sometimes in the 130s/80s at home.  She is not diabetic and is not a smoker.  I would like to check a one time carotid ultrasound to evaluate for atherosclerosis and/or stenosis.  She has been compliant with taking aspirin daily and denies any excessive bruising or bleeding.    Additional testing:  Blood work to check for reversible causes for her memory changes  Holter monitor to check for arrhythmia  Carotid ultrasound recommended around Sept 2025  Education & Recommendations:  For ongoing TIA/stroke prevention continue: aspirin and statin  Recommend to check blood pressure occasionally away from the doctor's office to make sure that those numbers are typically less than 130/80.  If they are frequently higher than that, we recommend checking it more frequently and following up with primary care team/cardiologist   Will defer to primary care team for monitoring of cholesterol panel and blood sugar numbers with target LDL cholesterol of less than 70 and hemoglobin A1c less than 7%  Continue all medication for blood pressure control as prescribed by your primary care team/cardiologist  Continue use of CPAP machine to treat MELISSA  Recommend following a low salt, mediterranean diet   Recommend routine physical exercise as tolerated   Offered referral to speech therapy at this time for occasional word-finding difficulty and memory changes but she kindly declined at this time  Avoid using NSAIDs for headaches or other pain and to use tylenol if needed          Atherosclerosis of both carotid arteries    Orders:    VAS carotid complete study; Future    Memory changes    Orders:    Vitamin B12; Future    Vitamin D 25 hydroxy; Future    C-reactive protein; Future     Sedimentation rate, automated; Future    Iron Panel (Includes Ferritin, Iron Sat%, Iron, and TIBC); Future    Chronic kidney disease, stage 3a (HCC)  Lab Results   Component Value Date    EGFR 54 (L) 03/25/2025    EGFR 54 03/10/2025    EGFR 61 03/09/2025    CREATININE 1.10 (H) 03/25/2025    CREATININE 1.04 03/10/2025    CREATININE 0.94 03/09/2025       Orders:    Vitamin D 25 hydroxy; Future    Stage 3a chronic kidney disease (HCC)  Lab Results   Component Value Date    EGFR 54 (L) 03/25/2025    EGFR 54 03/10/2025    EGFR 61 03/09/2025    CREATININE 1.10 (H) 03/25/2025    CREATININE 1.04 03/10/2025    CREATININE 0.94 03/09/2025       Orders:    Iron Panel (Includes Ferritin, Iron Sat%, Iron, and TIBC); Future    Essential hypertension         Mixed hyperlipidemia               History of Present Illness     We had the pleasure of evaluating Amy in neurological follow-up today. She is a 69 y.o. year-old female who presents today for a hospital follow up after stroke-like symptoms.     History obtained from patient as well as available medical record review.    For Review/Hospital Course:    3/7/25-3/10/25 SLB: Presents after an episode of sudden onset sharp chest pain, followed by L sided numbness, tingling, and lightheadedness. The chest pain resolved in 15 minutes after sitting. The numbness lasted 20 minutes and then gradually resolved over an additional 25 min. MRI brain completed, no acute intracranial abnormalities seen. Troponins negative. Due to positive sxs such as tingling & various other reported complaints, less likely TIA & more likely MSK vs HTN related in setting of pain / anxiety w/o recent hx of HTN noted. Ok for pt to remain on ASA / higher statin for the chronic cerebellar infarcts from neuro perspective until seen by o/p neuro for one time appt.    Interval history as of 5/15/25:  She denies any new strokelike symptoms since the hospital.  During her most recent hospitalization no acute infarcts  were found but she was noted to have chronic infarcts in the right cerebellum which were found incidentally.  She was unaware of this.  Patient does report ongoing issues with short term memory impairment (prior to hospitalization but worse since most recent stay), lightheadedness/dizziness (prior to hospitalization), issues projecting words (since recent hospitalization).  She denies any anxiety or stress. Her sleep is not the best, only getting about 6 hours per night.  She reports frequent wakenings but she does treat her MELISSA and is compliant with wearing her CPAP machine. [place] in the office today 25/30.  She denies any unsafe behaviors, getting lost, difficulty with any ADLs or IADls at this time.  She reports ongoing chest pain and tightness along with shortness of breath with exertion. Chest XR in the hospital was clear.  She does follow with cardiology for aortic valve insufficiency.   LDL remains just slightly above goal.  She takes crestor 5mg daily.  She is going to be getting repeat labs in a couple months and it can be reassessed then. Blood pressure in office today was great but reports sometimes in the 130s/80s at home.  She is not diabetic and is not a smoker.  I would like to check a one time carotid ultrasound to evaluate for atherosclerosis and/or stenosis.  She has been compliant with taking aspirin daily and denies any excessive bruising or bleeding.      New stroke symptoms/residual symptoms:    Any new stroke-like symptoms (sudden onset weakness, numbness, facial droop, slurred speech, difficulty speaking, trouble swallowing, persistent vertigo, or sudden double vision or vision loss): no    Residual symptoms include: short term memory impairment, lightheadedness/dizziness, issues projecting words.  She reports ongoing chest pain and tightness along with shortness of breath with exertion.     Secondary stroke prevention:    AP/AC therapy: aspirin 81mg    Statin therapy: rosuvastatin  5mg    Stroke risk factors were evaluated including:    Blood pressure today and as of late: 116/74 today in the office. At home she sees 130s/80s.     Most recent LDL:   Lab Results   Component Value Date    LDLCALC 80 03/07/2025      Most recent hemoglobin A1C:   Lab Results   Component Value Date    HGBA1C 5.6 03/07/2025      Cardiology/Cardiac monitoring: Echo 3/8/25 EF 50-55%. Bilateral atria normal in size. Patient with aortic valve disease.  Recommending outpatient zio patch or implanted loop recorder.      Endocrinology: N/A    Lifestyle history/modifications:    -Diet/Exercise regimen: Follows low carb. She is starting to walk again.      -PT/OT/ST: none required at this time per patient    -Sleep: 6 hours. Has some trouble staying asleep.   Has MELISSA and is compliant with wearing her CPAP    -Post-stroke depression/anxiety: No. She reports significantly less stress since retired.     -Tobacco: none    -ETOH: stopped March 7th    -Other drug use: none    Safety:    Independent of all ADLs  No falls at home  Ambulates without an assistive device  Manages own meds & finances  Able to drive own vehicle  Denies problems with memory, speech, or swallowing    Review of Systems   Constitutional:  Positive for fatigue. Negative for appetite change and fever.        Patient states having extreme fatigue.    HENT: Negative.  Negative for hearing loss, tinnitus, trouble swallowing and voice change.    Eyes: Negative.  Negative for photophobia, pain and visual disturbance.   Respiratory:  Positive for shortness of breath.         Patient states she has had SOB when doing activities throughout the day. Patient states having a sharp chest pain, not lasting long and not often.    Cardiovascular:  Positive for chest pain. Negative for palpitations.   Gastrointestinal: Negative.  Negative for nausea and vomiting.   Endocrine: Negative.  Negative for cold intolerance and polyphagia.   Genitourinary: Negative.  Negative for  dysuria, frequency and urgency.   Musculoskeletal:  Negative for back pain, gait problem, myalgias, neck pain and neck stiffness.   Skin: Negative.  Negative for rash.   Allergic/Immunologic: Negative.    Neurological:  Positive for dizziness, speech difficulty and light-headedness. Negative for tremors, seizures, syncope, facial asymmetry, weakness and numbness.        Patient states dizziness and lightheadedness it can happen any given time, not lasting long. Patient states she is having a hard time projecting words.    Hematological: Negative.  Does not bruise/bleed easily.   Psychiatric/Behavioral:  Positive for confusion and sleep disturbance. Negative for hallucinations.         Patient states is having a hard time with her short term memory. She says she is having a ahrd time staying asleep.     I have personally reviewed the MA's review of systems and made changes as necessary.    Current Outpatient Medications on File Prior to Visit   Medication Sig Dispense Refill    aspirin 81 mg chewable tablet Chew 1 tablet (81 mg total) daily      chlorthalidone 25 mg tablet Take 0.5 tablets (12.5 mg total) by mouth daily 15 tablet 5    cholecalciferol (VITAMIN D3) 1,000 units tablet Take 1,000 Units by mouth in the morning. 5,000 units daily twice a day.      cyanocobalamin (VITAMIN B-12) 500 mcg tablet Take 1 tablet by mouth in the morning.      labetalol (NORMODYNE) 100 mg tablet take 1 tablet by mouth twice a day 180 tablet 1    Multiple Vitamins-Minerals (MULTIVITAMIN ADULT) TABS Take 1 tablet by mouth in the morning.      protriptyline (VIVACTIL) 5 MG tablet take 1 tablet by mouth at bedtime 90 tablet 1    rosuvastatin (CRESTOR) 5 mg tablet Take 1 tablet (5 mg total) by mouth daily 90 tablet 1    trandolapril (MAVIK) 4 MG tablet take 1 tablet by mouth once daily 90 tablet 1    verapamil (CALAN) 80 mg tablet take 1 tablet by mouth three times a day 270 tablet 1    acetaminophen (TYLENOL) 650 mg CR tablet Take 1  tablet (650 mg total) by mouth every 8 (eight) hours as needed for mild pain (Patient not taking: Reported on 3/14/2025) 30 tablet 0    Calcium 600 MG tablet Take 2 tablets by mouth daily (Patient not taking: Reported on 3/14/2025)      calcium citrate-vitamin D (CITRACAL+D) 315-200 MG-UNIT per tablet Take 1 tablet by mouth 2 (two) times a day (Patient not taking: Reported on 3/14/2025)      Prolensa 0.07 % SOLN  (Patient not taking: Reported on 3/14/2025)       No current facility-administered medications on file prior to visit.      Social History     Tobacco Use    Smoking status: Former     Current packs/day: 0.00     Average packs/day: 1 pack/day for 36.7 years (36.7 ttl pk-yrs)     Types: Cigarettes     Start date: 1968     Quit date: 2005     Years since quittin.3    Smokeless tobacco: Never   Vaping Use    Vaping status: Never Used   Substance and Sexual Activity    Alcohol use: Yes     Alcohol/week: 4.0 standard drinks of alcohol     Types: 4 Standard drinks or equivalent per week    Drug use: Never    Sexual activity: Not Currently     Partners: Male     Birth control/protection: None     Objective   /74 (BP Location: Left arm, Patient Position: Sitting, Cuff Size: Large)     Physical Exam  Vitals reviewed.   Constitutional:       General: She is not in acute distress.  HENT:      Head: Normocephalic and atraumatic.      Nose: Nose normal.      Mouth/Throat:      Mouth: Mucous membranes are moist.     Eyes:      General: Lids are normal.      Extraocular Movements: Extraocular movements intact.      Pupils: Pupils are equal, round, and reactive to light.     Pulmonary:      Effort: No respiratory distress.     Skin:     General: Skin is warm and dry.     Neurological:      Coordination: Romberg sign positive.     Psychiatric:         Mood and Affect: Affect normal.         Speech: Speech normal.       Neurological Exam  Mental Status  Awake, alert and oriented to person, place and time.  Speech is normal. Language is fluent with no aphasia. Attention and concentration are normal.    Cranial Nerves  CN II: Visual acuity is normal. Visual fields full to confrontation.  CN III, IV, VI: Extraocular movements intact bilaterally. Normal lids and orbits bilaterally. Pupils equal round and reactive to light bilaterally.  CN V: Facial sensation is normal.  CN VII: Full and symmetric facial movement.  CN VIII: Hearing is normal.  CN IX, X: Palate elevates symmetrically  CN XI: Shoulder shrug strength is normal.  CN XII: Tongue midline without atrophy or fasciculations.    Motor  Normal muscle bulk throughout.                                               Right                     Left  Deltoid                                   5                          5   Biceps                                   5                          5   Triceps                                  5                          5   Iliopsoas                               5                          5   Quadriceps                           5                          5   Hamstring                             5                          5  Ankle dorsiflexor                   5                          5    Sensory  Light touch is normal in upper and lower extremities.     Coordination  Right: Finger-to-nose normal. Rapid alternating movement normal.Left: Finger-to-nose normal. Rapid alternating movement normal.    Gait  Casual gait is normal including stance, stride, and arm swing. Romberg is present.    Labs:  Lab Results   Component Value Date/Time    CHOLESTEROL 180 03/07/2025 02:48 PM    CHOLESTEROL 199 11/27/2024 06:43 AM     Lab Results   Component Value Date/Time    LDLCALC 80 03/07/2025 02:48 PM    LDLCALC 89 11/27/2024 06:43 AM     Lab Results   Component Value Date/Time    HGBA1C 5.6 03/07/2025 09:16 AM     Radiology Results Review:   3/9/25 MRI Brain: No acute infarct, significant mass effect or midline shift. Mild chronic ischemic  changes of the white matter. Chronic infarcts in the right cerebellar hemisphere.  3/7/25 CTA H&N: 1. No acute intracranial abnormality. 2. No large vessel occlusion in the head or neck. 3. Ectatic proximal ascending aorta measuring 43 mm in diameter. Follow-up CTA chest within 1 year is suggested. 4. Right sphenoid sinus mucosal disease as above, correlate clinically for signs and symptoms of acute sinusitis.    Administrative Statements   I have spent a total time of 60 minutes in caring for this patient on the day of the visit/encounter including Diagnostic results, Prognosis, Risks and benefits of tx options, Instructions for management, Patient and family education, Importance of tx compliance, Risk factor reductions, Impressions, Counseling / Coordination of care, Documenting in the medical record, Reviewing/placing orders in the medical record (including tests, medications, and/or procedures), and Obtaining or reviewing history  .

## 2025-05-15 ENCOUNTER — OFFICE VISIT (OUTPATIENT)
Dept: NEUROLOGY | Facility: CLINIC | Age: 70
End: 2025-05-15
Payer: COMMERCIAL

## 2025-05-15 VITALS — DIASTOLIC BLOOD PRESSURE: 74 MMHG | SYSTOLIC BLOOD PRESSURE: 116 MMHG

## 2025-05-15 DIAGNOSIS — I10 ESSENTIAL HYPERTENSION: ICD-10-CM

## 2025-05-15 DIAGNOSIS — I65.23 ATHEROSCLEROSIS OF BOTH CAROTID ARTERIES: Primary | ICD-10-CM

## 2025-05-15 DIAGNOSIS — N18.31 STAGE 3A CHRONIC KIDNEY DISEASE (HCC): ICD-10-CM

## 2025-05-15 DIAGNOSIS — N18.31 CHRONIC KIDNEY DISEASE, STAGE 3A (HCC): ICD-10-CM

## 2025-05-15 DIAGNOSIS — E78.2 MIXED HYPERLIPIDEMIA: ICD-10-CM

## 2025-05-15 DIAGNOSIS — Z86.73 HISTORY OF STROKE: ICD-10-CM

## 2025-05-15 DIAGNOSIS — R41.3 MEMORY CHANGES: ICD-10-CM

## 2025-05-15 PROCEDURE — 99214 OFFICE O/P EST MOD 30 MIN: CPT

## 2025-05-15 NOTE — PROGRESS NOTES
Name: Amy Can      : 1955      MRN: [id number]  Encounter Provider: LUCILLE Goode  Encounter Date: 5/15/2025   Encounter department: [facility] [place]  :  Assessment & Plan      {Ambulatory Patient Instructions (Optional):43501}    History of Present Illness {?Quick Links Encounters * My Last Note * Last Note in Specialty * Snapshot * Since Last Visit * History :68498}  Cerebrovascular Accident  Associated symptoms include chest pain and fatigue. Pertinent negatives include no fever, myalgias, nausea, neck pain, numbness, rash, vomiting or weakness.      Review of Systems   Constitutional:  Positive for fatigue. Negative for appetite change and fever.        Patient states having extreme fatigue.    HENT: Negative.  Negative for hearing loss, tinnitus, trouble swallowing and voice change.    Eyes: Negative.  Negative for photophobia, pain and visual disturbance.   Respiratory:  Positive for shortness of breath.         Patient states she has had SOB when doing activities throughout the day. Patient states having a sharp chest pain, not lasting long and not often.    Cardiovascular:  Positive for chest pain. Negative for palpitations.   Gastrointestinal: Negative.  Negative for nausea and vomiting.   Endocrine: Negative.  Negative for cold intolerance and polyphagia.   Genitourinary: Negative.  Negative for dysuria, frequency and urgency.   Musculoskeletal:  Negative for back pain, gait problem, myalgias, neck pain and neck stiffness.   Skin: Negative.  Negative for rash.   Allergic/Immunologic: Negative.    Neurological:  Positive for dizziness, speech difficulty and light-headedness. Negative for tremors, seizures, syncope, facial asymmetry, weakness and numbness.        Patient states dizziness and lightheadedness it can happen any given time, not lasting long. Patient states she is having a hard time projecting words.    Hematological: Negative.  Does not  bruise/bleed easily.   Psychiatric/Behavioral:  Positive for confusion and sleep disturbance. Negative for hallucinations.         Patient states is having a hard time with her short term memory. She says she is having a ahrd time staying asleep.    I have personally reviewed the MA's review of systems and made changes as necessary.    {Select to insert medical history sections (Optional):37169}     Objective {?Quick Links Trend Vitals * Enter New Vitals * Results Review * Timeline (Adult) * Labs * Imaging * Cardiology * Procedures * Lung Cancer Screening * Surgical eConsent :86753}  There were no vitals taken for this visit.    Physical Exam  Neurological Exam    {Radiology Results Review (Optional):51765}    {Administrative / Billing Section (Optional):77795}

## 2025-05-15 NOTE — ASSESSMENT & PLAN NOTE
Presented to the hospital on 3/7/25 after an episode of sudden onset sharp chest pain, followed by L sided numbness, tingling, and lightheadedness. The chest pain resolved in 15 minutes after sitting. The numbness lasted 20 minutes and then gradually resolved over an additional 25 min. MRI brain completed, no acute intracranial abnormalities seen. Troponins negative. Due to positive sxs such as tingling & various other reported complaints, less likely TIA & more likely MSK vs HTN related in setting of pain / anxiety w/o recent hx of HTN noted. She denies any new strokelike symptoms since the hospital.  During her most recent hospitalization no acute infarcts were found but she was noted to have chronic infarcts in the right cerebellum which were found incidentally.  She was unaware of this.  Patient does report ongoing issues with short term memory impairment (prior to hospitalization but worse since most recent stay), lightheadedness/dizziness (prior to hospitalization), issues projecting words (since recent hospitalization).  She denies any anxiety or stress. Her sleep is not the best, only getting about 6 hours per night.  She reports frequent wakenings but she does treat her MELISSA and is compliant with wearing her CPAP machine. [place] in the office today 25/30.  She denies any unsafe behaviors, getting lost, difficulty with any ADLs or IADls at this time.  She reports ongoing chest pain and tightness along with shortness of breath with exertion. Chest XR in the hospital was clear.  She does follow with cardiology for aortic valve insufficiency.  She also had questions in regard to an EKG that was completed in the ED. I have recommended for her to direct those questions and concerns to her cardiologist.   LDL remains just slightly above goal.  She takes crestor 5mg daily.  She is going to be getting repeat labs in a couple months and it can be reassessed then. Blood pressure in office today was great but reports  sometimes in the 130s/80s at home.  She is not diabetic and is not a smoker.  I would like to check a one time carotid ultrasound to evaluate for atherosclerosis and/or stenosis.  She has been compliant with taking aspirin daily and denies any excessive bruising or bleeding.    Additional testing:  Blood work to check for reversible causes for her memory changes  Holter monitor to check for arrhythmia  Carotid ultrasound recommended around Sept 2025  Education & Recommendations:  For ongoing TIA/stroke prevention continue: aspirin and statin  Recommend to check blood pressure occasionally away from the doctor's office to make sure that those numbers are typically less than 130/80.  If they are frequently higher than that, we recommend checking it more frequently and following up with primary care team/cardiologist   Will defer to primary care team for monitoring of cholesterol panel and blood sugar numbers with target LDL cholesterol of less than 70 and hemoglobin A1c less than 7%  Continue all medication for blood pressure control as prescribed by your primary care team/cardiologist  Continue use of CPAP machine to treat MELISSA  Recommend following a low salt, mediterranean diet   Recommend routine physical exercise as tolerated   Offered referral to speech therapy at this time for occasional word-finding difficulty and memory changes but she kindly declined at this time  Avoid using NSAIDs for headaches or other pain and to use tylenol if needed

## 2025-06-02 DIAGNOSIS — I10 ESSENTIAL HYPERTENSION: ICD-10-CM

## 2025-06-02 DIAGNOSIS — E78.2 MIXED HYPERLIPIDEMIA: ICD-10-CM

## 2025-06-02 RX ORDER — ROSUVASTATIN CALCIUM 5 MG/1
5 TABLET, COATED ORAL DAILY
Qty: 90 TABLET | Refills: 0 | Status: SHIPPED | OUTPATIENT
Start: 2025-06-02 | End: 2025-06-05 | Stop reason: ALTCHOICE

## 2025-06-03 RX ORDER — VERAPAMIL HYDROCHLORIDE 80 MG/1
80 TABLET ORAL 3 TIMES DAILY
Qty: 270 TABLET | Refills: 1 | Status: SHIPPED | OUTPATIENT
Start: 2025-06-03

## 2025-06-05 ENCOUNTER — OFFICE VISIT (OUTPATIENT)
Dept: CARDIOLOGY CLINIC | Facility: CLINIC | Age: 70
End: 2025-06-05
Attending: INTERNAL MEDICINE
Payer: COMMERCIAL

## 2025-06-05 VITALS
SYSTOLIC BLOOD PRESSURE: 116 MMHG | OXYGEN SATURATION: 96 % | HEART RATE: 92 BPM | DIASTOLIC BLOOD PRESSURE: 90 MMHG | WEIGHT: 239.8 LBS | BODY MASS INDEX: 39.9 KG/M2

## 2025-06-05 DIAGNOSIS — E66.01 OBESITY, MORBID (HCC): ICD-10-CM

## 2025-06-05 DIAGNOSIS — E78.00 PURE HYPERCHOLESTEROLEMIA: ICD-10-CM

## 2025-06-05 DIAGNOSIS — G47.33 OBSTRUCTIVE SLEEP APNEA SYNDROME: ICD-10-CM

## 2025-06-05 DIAGNOSIS — R00.2 PALPITATIONS: ICD-10-CM

## 2025-06-05 DIAGNOSIS — I10 ESSENTIAL HYPERTENSION: Primary | ICD-10-CM

## 2025-06-05 DIAGNOSIS — G45.9 TIA (TRANSIENT ISCHEMIC ATTACK): ICD-10-CM

## 2025-06-05 DIAGNOSIS — I77.819 AORTIC DILATATION (HCC): ICD-10-CM

## 2025-06-05 DIAGNOSIS — Z86.73 OLD CEREBELLAR INFARCT WITHOUT LATE EFFECT: ICD-10-CM

## 2025-06-05 PROCEDURE — 99215 OFFICE O/P EST HI 40 MIN: CPT | Performed by: NURSE PRACTITIONER

## 2025-06-05 RX ORDER — ROSUVASTATIN CALCIUM 10 MG/1
10 TABLET, COATED ORAL DAILY
Start: 2025-06-05 | End: 2025-06-06 | Stop reason: SDUPTHER

## 2025-06-05 NOTE — LETTER
2025     Ross Griffith MD  [address] [facility]  [address]  [place] [address]    Patient: Amy Can   YOB: 1955   Date of Visit: 2025       Dear Dr. Ross Griffith MD:    Thank you for referring Amy Can to me for evaluation. Below are my notes for this consultation.    If you have questions, please do not hesitate to call me. I look forward to following your patient along with you.         Sincerely,        LUCILLE Pineda        CC: No Recipients    LUCILLE Pineda  2025  9:57 PM  Sign when Signing Visit  Cardiology  Follow Up   Office Visit Note  Amy Can   69 y.o.   female   MRN: [id number]  [facility] [place]  [address] [facility] [address]  [address]  [facility] [address]  [phone]    PCP: Ross Griffith MD  Cardiologist: Dr. Joseph                Summary of Plan:  Heart healthy diet: Mediterranean or DASH.  Education provided  Carotid US:  pending  1 week zio patch.  Will be mailed to her houe re;  palpsitations.  HX cerebellar infarcts- chronic   Increase rosuva to 10 mg/d  Follow-up with Dr. Joseph  Colon Ca screenin2021 , up to date        Assessment/Plan  Palpitations  Will order a 1 week Zio patch.  This will be mailed to her house.  Rule out A-fib  She has a history of cerebellar infarcts, per imaging  TSH is satisfactory  Hypertension  /90  On verapamil 80 mg 3 times a day, chlorthalidone 12.5 mg daily, Mavik 4 mg daily  Periodic home BP monitoring  DASH diet  Dyslipidemia  3/7/2025: Calculated LDL 80.     On rosuvastatin 5 mg daily.  Will increase to 10 mg daily for goal LDL less than 70  Moderate aortic insufficiency.  Maintain blood pressure less than 130/80  Serial echocardiograms  History of TIA  Chronic cerebellar infarcts per MRI brain.   follow-up with neurology  On aspirin, statin  MELISSA. Compliant with CPAP  Obesity, BMI 39.44 kg/m².  She would benefit from weight loss.  Prior tobacco,  in remission  Cardiac testing  TTE 3/9/2025.  EF 50 to 55%.  Moderate LVH.  Grade 1 DD.  Moderate AI with a centrally directed jet.  No significant change compared to prior study 3/5/2024                HPI  Amy Can is a 69 y.o.year old female with hypertension, aortic valve disease and LVH.  She follows Dr. Joseph and was last seen 5/3/2024.        5/3/2024 OV Dr. Joseph  Per his note  Interval History:  Patient is here for a f/u visit. Patient has AVD.  Lipid profile 4/2024 demonstrated total cholesterol of 197 with an HDL of 86 and a calculated LDL of 90.  Echo 3/5/2024 demonstrated LVEF of 55%.  Moderate AI with mild AS was noted with mean gradient across the valve of 15 mmHg.  There is mild TR with a mild elevation in PASP.  Ascending aorta was 4.3 cm.  Peak velocity across the AV was 2.48 m/s.  LVOT peak velocity was 1.32 m/s.  There was no significant change compared to 2/18/2022.  CT of the chest 8/1/2023 demonstrated fusiform ectasia of the ascending thoracic aorta at 4.1 cm.  Vital signs are stable today.  EKG today demonstrates NSR with PRWP with no significant change compared to a prior tracing 7/29/2021.  Patient is HTN and aortic valve disease are stable on her current medicine.  Patient has had no angina or significant dyspnea.       6/5/2025  Cardiology follow-up  She was admitted 3/10/2025 with chest pain and strokelike symptoms, diagnosed with hypertensive urgency /101.  She was evaluated by neurology.  They felt her symptoms were less likely TIA more likely musculoskeletal versus hypertension related.   + palpitations  Imagiing;  old cerebellar infarcts  She was discharged on her home regimen of labetalol 100 twice daily verapamil 80 3 times daily trandolapril 4 mg daily.  She was started on chlorthalidone 12.5 mg daily  She was started on aspirin 81 mg /d    ROS: Occasional short of breath symptoms.  Complains of dizziness often, occ ankle swelling and occasional  palpitations.  /90  Weight 239 pounds  Last labs 3/25/2025 creatinine 1.10 BUN 26 potassium 4.0.  Hemoglobin 15.9.  LDL 80    Current cardiac medications: Verapamil 80 mg  TID,  rosuvastatin 5 mg daily, aspirin 81, chlorthalidone 12.5 mg daily, labetalol 100 mg twice daily    Carotid ultrasound has been requested and is pending.  Will order 1 week Zio patch given her palpitations to rule out A-fib in the context of history of cerebellar infarcts.  Will increase rosuvastatin to 10 mg daily to optimize LDL less than 70.      Review of Systems   Constitutional: Positive for malaise/fatigue. Negative for chills.   Cardiovascular:  Positive for leg swelling and palpitations. Negative for chest pain, claudication, cyanosis, dyspnea on exertion, irregular heartbeat, near-syncope, orthopnea, paroxysmal nocturnal dyspnea and syncope.   Respiratory:  Positive for shortness of breath. Negative for cough.    Gastrointestinal:  Negative for heartburn and nausea.   Neurological:  Positive for dizziness. Negative for focal weakness, headaches, light-headedness and weakness.   All other systems reviewed and are negative.        Assessment  Diagnoses and all orders for this visit:    Essential hypertension  -     POCT ECG    Aortic dilatation (HCC)    TIA (transient ischemic attack)    Obstructive sleep apnea syndrome    Obesity, morbid (HCC)    Pure hypercholesterolemia  -     Discontinue: rosuvastatin (CRESTOR) 10 MG tablet; Take 1 tablet (10 mg total) by mouth daily    Palpitations  -     Zio Monitor    Old cerebellar infarct without late effect  -     Zio Monitor    Other orders  -     Ambulatory Referral to Cardiology        Past Medical History[1]    Past Surgical History[2]        Allergies  Allergies[3]      Medications  Current Medications[4]      Social History     Socioeconomic History   • Marital status: /Civil Union     Spouse name: Not on file   • Number of children: 2   • Years of education: Not on file    • Highest education level: Not on file   Occupational History   • Not on file   Tobacco Use   • Smoking status: Former     Current packs/day: 0.00     Average packs/day: 1 pack/day for 36.7 years (36.7 ttl pk-yrs)     Types: Cigarettes     Start date: 1968     Quit date: 2005     Years since quittin.4   • Smokeless tobacco: Never   Vaping Use   • Vaping status: Never Used   Substance and Sexual Activity   • Alcohol use: Yes     Alcohol/week: 4.0 standard drinks of alcohol     Types: 4 Standard drinks or equivalent per week   • Drug use: Never   • Sexual activity: Not Currently     Partners: Male     Birth control/protection: None   Other Topics Concern   • Not on file   Social History Narrative    2 daughters    Travel outside [place]: No      Social Drivers of Health     Financial Resource Strain: Low Risk  (2023)    Overall Financial Resource Strain (CARDIA)    • Difficulty of Paying Living Expenses: Not hard at all   Food Insecurity: No Food Insecurity (2025)    Nursing - Inadequate Food Risk Classification    • Worried About Running Out of Food in the Last Year: Never true    • Ran Out of Food in the Last Year: Never true    • Ran Out of Food in the Last Year: Never true   Transportation Needs: No Transportation Needs (2025)    PRAPARE - Transportation    • Lack of Transportation (Medical): No    • Lack of Transportation (Non-Medical): No   Physical Activity: Not on file   Stress: Not on file   Social Connections: Not on file   Intimate Partner Violence: Unknown (3/7/2025)    Nursing IPS    • Feels Physically and Emotionally Safe: Not on file    • Physically Hurt by Someone: Not on file    • Humiliated or Emotionally Abused by Someone: Not on file    • Physically Hurt by Someone: No    • Hurt or Threatened by Someone: No   Housing Stability: Low Risk  (2025)    Housing Stability Vital Sign    • Unable to Pay for Housing in the Last Year: No    • Number of Times Moved in the Last Year:  "0    • Homeless in the Last Year: No       Family History[5]    Physical Exam  Vitals and nursing note reviewed.   Constitutional:       General: She is not in acute distress.     Appearance: She is obese. She is not diaphoretic.   HENT:      Head: Normocephalic and atraumatic.     Eyes:      Conjunctiva/sclera: Conjunctivae normal.       Cardiovascular:      Rate and Rhythm: Normal rate and regular rhythm.      Pulses: Intact distal pulses.      Heart sounds: Normal heart sounds.   Pulmonary:      Effort: Pulmonary effort is normal.      Breath sounds: Normal breath sounds.   Abdominal:      General: Bowel sounds are normal.      Palpations: Abdomen is soft.     Musculoskeletal:         General: Normal range of motion.      Cervical back: Normal range of motion and neck supple.     Skin:     General: Skin is warm and dry.     Neurological:      Mental Status: She is alert and oriented to person, place, and time.         Vitals: Blood pressure 116/90, pulse 92, weight 109 kg (239 lb 12.8 oz), SpO2 96%.   Wt Readings from Last 3 Encounters:   25 109 kg (239 lb 12.8 oz)   25 108 kg (237 lb)   25 107 kg (235 lb)           Labs & Results:  Lab Results   Component Value Date    WBC 6.13 03/10/2025    HGB 15.9 (H) 03/10/2025    HCT 49.3 (H) 03/10/2025    MCV 94 03/10/2025     03/10/2025     No results found for: \"BNP\"  No components found for: \"CHEM\"  No results found for: \"CKTOTAL\", \"TROPONINI\", \"TROPONINT\", \"CKMBINDEX\"  Results for orders placed during the hospital encounter of 21    Echo complete with contrast if indicated    Narrative  [address] [address] [phone]    Transthoracic Echocardiogram  2D, M-mode, Doppler, and Color Doppler    Study date:  2021    Patient: YANNI BOCANEGRA  MR number: [id number]  Account number: [id number]  : 1955  Age: 65 years  Gender: Female  Status: Outpatient  Location: " [facility]  Height: 66 in  Weight: 213.6 lb  BP: 138/ 88 mmHg    Indications: Aortic valve disease    Diagnoses: I10. - Essential (primary) hypertension, I35.9 - Nonrheumatic aortic valve disorder, unspecified, I51.7 - Cardiomegaly    Sonographer:  Teresa Nunes BS, RDCS, RVT, RDMS  Primary Physician:  Ross Griffith MD  Referring Physician:  Deyvi Joseph MD  Group:  [facility]  Cardiology Fellow:  Trav Salas MD  Interpreting Physician:  Jonathan King MD    SUMMARY    LEFT VENTRICLE:  Systolic function was normal. Ejection fraction was estimated to be 60 %.  There were no regional wall motion abnormalities.  Wall thickness was at the upper limits of normal.  Doppler parameters were consistent with abnormal left ventricular relaxation (grade 1 diastolic dysfunction).    AORTIC VALVE:  The valve was trileaflet. Leaflets exhibited mildly to moderately increased thickness and normal cuspal separation.  There was moderate regurgitation.    TRICUSPID VALVE:  There was trace regurgitation.    PULMONIC VALVE:  There was trace regurgitation.    AORTA:  The root exhibited normal size.  The ascending aorta maximal AP dimension was 40 mm.    HISTORY: PRIOR HISTORY: HTN, LVH, AR, AV sclerosis, former smoker, obesity with a BMI of 34.5.    PROCEDURE: The study was performed in the [address] [place] [address] [facility] [place]. This was a routine study. The transthoracic approach was used. The study included complete 2D imaging, M-mode, complete spectral Doppler, and color Doppler. The  heart rate was 55 bpm, at the start of the study. Images were obtained from the parasternal, apical, subcostal, and suprasternal notch acoustic windows. Image quality was good.    LEFT VENTRICLE: Size was normal. Systolic function was normal. Ejection fraction was estimated to be 60 %. There were no regional wall motion abnormalities. Wall thickness was at the upper limits of normal. DOPPLER: Doppler  parameters were  consistent with abnormal left ventricular relaxation (grade 1 diastolic dysfunction).    RIGHT VENTRICLE: The size was normal. Systolic function was normal. Wall thickness was normal.    LEFT ATRIUM: Size was normal.    RIGHT ATRIUM: Size was normal.    MITRAL VALVE: Valve structure was normal. There was normal leaflet separation. DOPPLER: The transmitral velocity was within the normal range. There was no evidence for stenosis. There was no significant regurgitation.    AORTIC VALVE: The valve was trileaflet. Leaflets exhibited mildly to moderately increased thickness and normal cuspal separation. DOPPLER: Transaortic velocity was within the normal range. There was no evidence for stenosis. There was  moderate regurgitation.    TRICUSPID VALVE: The valve structure was normal. There was normal leaflet separation. DOPPLER: The transtricuspid velocity was within the normal range. There was no evidence for stenosis. There was trace regurgitation. Estimated peak PA  pressure was 31 mmHg.    PULMONIC VALVE: Leaflets exhibited normal thickness, no calcification, and normal cuspal separation. DOPPLER: The transpulmonic velocity was within the normal range. There was trace regurgitation.    PERICARDIUM: There was no pericardial effusion. The pericardium was normal in appearance.    AORTA: The root exhibited normal size.    SYSTEMIC VEINS: IVC: The inferior vena cava was normal in size.    MEASUREMENT TABLES    2D MEASUREMENTS  Aorta   (Reference normals)  AAo max AP diam   40 mm   (--)    SYSTEM MEASUREMENT TABLES    2D  %FS: 30.47 %  Ao asc: 3.91 cm  EDV(Teich): 115.85 ml  EF(Teich): 57.7 %  ESV(Teich): 49 ml  IVSd: 1.16 cm  LA Area: 14.69 cm2  LVEDV MOD A4C: 118.03 ml  LVEF MOD A4C: 63.13 %  LVESV MOD A4C: 43.51 ml  LVIDd: 4.96 cm  LVIDs: 3.45 cm  LVLd A4C: 8.35 cm  LVLs A4C: 7.29 cm  LVOT Diam: 2.01 cm  LVPWd: 0.96 cm  RA Area: 14.76 cm2  RVIDd: 2.96 cm  SV MOD A4C: 74.52 ml  SV(Teich): 66.85 ml    CW  AR  Dec Ziebach: 1.62 m/s2  AR Dec Time: 2468.33 ms  AR PHT: 715.82 ms  AR Vmax: 4.01 m/s  AR maxP.26 mmHg  AV Env.Ti: 338.73 ms  AV VTI: 51.71 cm  AV Vmax: 2.26 m/s  AV Vmean: 1.53 m/s  AV maxP.37 mmHg  AV meanPG: 10.6 mmHg  TR Vmax: 2.57 m/s  TR maxP.44 mmHg    MM  AV Cusp: 1.57 cm  Ao Diam: 3.66 cm  LA Diam: 3.63 cm  LA/Ao: 0.99  TAPSE: 2.91 cm    PW  JULISSA (VTI): 2.19 cm2  JULISSA Vmax: 2 cm2  AVAI (VTI): 0 cm2/m2  AVAI Vmax: 0 cm2/m2  E' Av.11 m/s  E' Lat: 0.14 m/s  E' Sept: 0.08 m/s  E/E' Av.37  E/E' Lat: 6.38  E/E' Sept: 12.13  LVOT Env.Ti: 368.22 ms  LVOT VTI: 35.65 cm  LVOT Vmax: 1.42 m/s  LVOT Vmean: 0.97 m/s  LVOT maxP.08 mmHg  LVOT meanP.37 mmHg  LVSI Dopp: 55.05 ml/m2  LVSV Dopp: 113.4 ml  MV A Quinton: 1.12 m/s  MV Dec Ziebach: 4.78 m/s2  MV DecT: 193.28 ms  MV E Quinton: 0.92 m/s  MV E/A Ratio: 0.83  MV PHT: 56.05 ms  MVA By PHT: 3.93 cm2    [employer] Accredited Echocardiography Laboratory    Prepared and electronically signed by    Jonathan King MD  Signed 2021 10:59:49    No results found for this or any previous visit.    No valid procedures specified.  No results found for this or any previous visit.                This note was completed in part utilizing [employer] direct voice recognition software.   Grammatical errors, random word insertion, spelling mistakes, and incomplete sentences may be an occasional consequence of the system secondary to software limitations, ambient noise and hardware issues. At the time of dictation, efforts were made to edit, clarify and /or correct errors.  Please read the chart carefully and recognize, using context, where substitutions have occurred.  If you have any questions or concerns about the context, text or information contained within the body of this dictation, please contact myself, the provider, for further clarification           [1]   Past Medical History:  Diagnosis Date   • Allergic rhinitis    • Aortic  regurgitation    • Chronic kidney disease 2022   • Heart murmur 2006   • HTN (hypertension)    • Hyperlipidemia    • Hypertension 2006   • Nonrheumatic aortic (valve) insufficiency    • Sleep apnea    • Vitamin B12 deficiency    [2]   Past Surgical History:  Procedure Laterality Date   • BUNIONECTOMY     • CHOLECYSTECTOMY     • COLONOSCOPY  07/02/2018   • DILATION AND CURETTAGE OF UTERUS     • MAMMO (HISTORICAL)  03/21/2017 5/18/2018 and 3/21/2017    • RI LAPAROSCOPIC APPENDECTOMY N/A 8/2/2021    Procedure: APPENDECTOMY LAPAROSCOPIC;  Surgeon: Percy Peñaloza MD;  Location: [facility];  Service: Colorectal   • WISDOM TOOTH EXTRACTION     [3]   Allergies  Allergen Reactions   • Penicillins Rash     Other reaction(s): PENICILLIN G POTASSIUM (PENICILLIN)     [4]   Current Outpatient Medications:   •  aspirin 81 mg chewable tablet, Chew 1 tablet (81 mg total) daily, Disp: , Rfl:   •  Calcium 600 MG tablet, Take 2 tablets by mouth daily, Disp: , Rfl:   •  calcium citrate-vitamin D (CITRACAL+D) 315-200 MG-UNIT per tablet, Take 1 tablet by mouth 2 (two) times a day, Disp: , Rfl:   •  chlorthalidone 25 mg tablet, Take 0.5 tablets (12.5 mg total) by mouth daily, Disp: 15 tablet, Rfl: 5  •  cholecalciferol (VITAMIN D3) 1,000 units tablet, Take 1,000 Units by mouth in the morning. 5,000 units daily twice a day., Disp: , Rfl:   •  cyanocobalamin (VITAMIN B-12) 500 mcg tablet, Take 1 tablet by mouth in the morning., Disp: , Rfl:   •  labetalol (NORMODYNE) 100 mg tablet, take 1 tablet by mouth twice a day, Disp: 180 tablet, Rfl: 1  •  Multiple Vitamins-Minerals (MULTIVITAMIN ADULT) TABS, Take 1 tablet by mouth in the morning., Disp: , Rfl:   •  protriptyline (VIVACTIL) 5 MG tablet, take 1 tablet by mouth at bedtime, Disp: 90 tablet, Rfl: 1  •  trandolapril (MAVIK) 4 MG tablet, take 1 tablet by mouth once daily, Disp: 90 tablet, Rfl: 1  •  verapamil (CALAN) 80 mg tablet, Take 1 tablet (80 mg total) by mouth 3 (three) times a  day, Disp: 270 tablet, Rfl: 1  •  acetaminophen (TYLENOL) 650 mg CR tablet, Take 1 tablet (650 mg total) by mouth every 8 (eight) hours as needed for mild pain (Patient not taking: Reported on 3/14/2025), Disp: 30 tablet, Rfl: 0  •  Prolensa 0.07 % SOLN, , Disp: , Rfl:   •  rosuvastatin (CRESTOR) 10 MG tablet, Take 1 tablet (10 mg total) by mouth daily, Disp: 90 tablet, Rfl: 1  [5]   Family History  Problem Relation Name Age of Onset   • Coronary artery disease Mother     • Kidney cancer Father     • No Known Problems Sister     • No Known Problems Sister     • No Known Problems Sister          [1]  Past Medical History:  Diagnosis Date   • Allergic rhinitis    • Aortic regurgitation    • Chronic kidney disease 2022   • Heart murmur 2006   • HTN (hypertension)    • Hyperlipidemia    • Hypertension 2006   • Nonrheumatic aortic (valve) insufficiency    • Sleep apnea    • Vitamin B12 deficiency    [2]  Past Surgical History:  Procedure Laterality Date   • BUNIONECTOMY     • CHOLECYSTECTOMY     • COLONOSCOPY  07/02/2018   • DILATION AND CURETTAGE OF UTERUS     • MAMMO (HISTORICAL)  03/21/2017 5/18/2018 and 3/21/2017    • NV LAPAROSCOPIC APPENDECTOMY N/A 8/2/2021    Procedure: APPENDECTOMY LAPAROSCOPIC;  Surgeon: Percy Peñaloza MD;  [facility];  Service: Colorectal   • WISDOM TOOTH EXTRACTION     [3]  Allergies  Allergen Reactions   • Penicillins Rash     Other reaction(s): PENICILLIN G POTASSIUM (PENICILLIN)     [4]    Current Outpatient Medications:   •  aspirin 81 mg chewable tablet, Chew 1 tablet (81 mg total) daily, Disp: , Rfl:   •  Calcium 600 MG tablet, Take 2 tablets by mouth daily, Disp: , Rfl:   •  calcium citrate-vitamin D (CITRACAL+D) 315-200 MG-UNIT per tablet, Take 1 tablet by mouth 2 (two) times a day, Disp: , Rfl:   •  chlorthalidone 25 mg tablet, Take 0.5 tablets (12.5 mg total) by mouth daily, Disp: 15 tablet, Rfl: 5  •  cholecalciferol (VITAMIN D3) 1,000 units tablet, Take 1,000 Units  by mouth in the morning. 5,000 units daily twice a day., Disp: , Rfl:   •  cyanocobalamin (VITAMIN B-12) 500 mcg tablet, Take 1 tablet by mouth in the morning., Disp: , Rfl:   •  labetalol (NORMODYNE) 100 mg tablet, take 1 tablet by mouth twice a day, Disp: 180 tablet, Rfl: 1  •  Multiple Vitamins-Minerals (MULTIVITAMIN ADULT) TABS, Take 1 tablet by mouth in the morning., Disp: , Rfl:   •  protriptyline (VIVACTIL) 5 MG tablet, take 1 tablet by mouth at bedtime, Disp: 90 tablet, Rfl: 1  •  trandolapril (MAVIK) 4 MG tablet, take 1 tablet by mouth once daily, Disp: 90 tablet, Rfl: 1  •  verapamil (CALAN) 80 mg tablet, Take 1 tablet (80 mg total) by mouth 3 (three) times a day, Disp: 270 tablet, Rfl: 1  •  acetaminophen (TYLENOL) 650 mg CR tablet, Take 1 tablet (650 mg total) by mouth every 8 (eight) hours as needed for mild pain (Patient not taking: Reported on 3/14/2025), Disp: 30 tablet, Rfl: 0  •  Prolensa 0.07 % SOLN, , Disp: , Rfl:   •  rosuvastatin (CRESTOR) 10 MG tablet, Take 1 tablet (10 mg total) by mouth daily, Disp: 90 tablet, Rfl: 1  [5]  Family History  Problem Relation Name Age of Onset   • Coronary artery disease Mother     • Kidney cancer Father     • No Known Problems Sister     • No Known Problems Sister     • No Known Problems Sister

## 2025-06-05 NOTE — PATIENT INSTRUCTIONS
"Patient Education     DASH diet   The Basics   Written by the doctors and editors at [facility]   What is the DASH diet? -- DASH stands for \"dietary approaches to stop hypertension.\" It is an eating plan that can help lower blood pressure. It can also help prevent high blood pressure, which doctors call \"hypertension.\" You don't need special foods or recipes to follow the DASH diet. It is more about eating certain types of foods in certain amounts.  The DASH diet has lots of fruits and vegetables, whole grains, lean meats, healthy fats, and low-fat or fat-free dairy products (figure 1). It is low in saturated fats, trans fats, cholesterol, added sugars, and sodium (salt).  The standard DASH diet limits sodium to no more than 2300 mg a day. Your doctor or nurse can talk to you about what your specific goals should be.  Why do I need the DASH diet? -- The DASH diet can help you:   Lower your blood pressure and cholesterol   Lower your risk for cancer, heart disease, heart attack, and stroke. It might also lower your risk for heart failure, kidney stones, and diabetes.   Lose weight or keep a healthy weight  What can I eat and drink on the DASH diet? -- Below are some guidelines and examples for your daily and weekly nutrition goals. These are based on a 2000-calorie-per-day eating plan.  Daily goals:   Grains - Try to eat 6 to 8 servings of whole-grain, high-fiber foods each day. Examples of a serving include 1 slice of bread, 1 ounce (30 grams) of dry cereal, or 1/2 cup (120 grams) of cooked cereal, pasta, or brown rice.   Fruits - Try to eat 4 to 5 servings of fruit each day. Examples of a serving include 1 medium fruit or 1/2 cup (75 grams) of fresh, frozen, or canned fruit. Try to eat different kinds and colors. Frozen or canned fruit should not have added sugar. Look for frozen or canned fruits with 100 percent fruit juice or water.   Vegetables - Try to eat 4 to 5 servings of vegetables each day. Examples of a " "serving include 1 cup (40 grams) of leafy greens or 1/2 cup (75 grams) of fresh or cooked vegetables. Try to pick many kinds and colors. If you buy canned vegetables, look for \"low sodium\" or \"salt free.\" Buy plain, frozen vegetables to avoid added fat and sodium.   Dairy - Try to eat 2 to 3 servings of fat-free or low-fat milk products each day. Examples of a serving include 1 cup (240 mL) of milk or yogurt or 1.5 ounces (45 grams) of cheese.   Lean meats, poultry, and seafood - Try to eat 6 or fewer servings of lean meat, poultry, and seafood each day. Examples of a serving include 1 egg or 1 ounce (30 grams) of cooked meat, poultry, or fish. Try to choose more low-fat or lean meats like chicken, fish, or turkey. Eat less red meat.   Fats and oils - Try to eat 2 to 3 servings of fats and oils each day. Examples of a serving include 1 teaspoon (5 mL) of soft margarine or vegetable oil, or 1 tablespoon (18 grams) of mayonnaise. Eat healthy fats like those found in fish, nuts, and avocados. Try using olive oil or vegetable oils such as canola oil. You can also try corn, safflower, sunflower, or soybean oils. Use low-sodium and low-fat salad dressing and mayonnaise.  Weekly goals:   Nuts, seeds, and legumes (dry beans and peas) - Try to eat 4 to 5 servings each week. Examples of a serving include 1/3 cup (45 grams) of nuts, 2 tablespoons (50 grams) of nut butter or seeds, or 1/2 cup (75 grams) of cooked legumes. Try almonds and walnuts, sunflower seeds, peanut or other nut butters, soybeans, lentils, kidney beans, and split peas.   Sweets - Try to eat fewer than 5 servings each week. Examples of a serving include 1 tablespoon (14 grams) of sugar or jelly, or 1/2 cup (120 grams) of gelatin. Choose low-fat and trans fat-free desserts. These include fruit-flavored gelatin, sorbet, jellybeans, raya crackers, animal crackers, low-fat fig bars, and hannah snaps. Eat fruit to satisfy the desire for sweets.  To add flavor, " use pepper, herbs, spices, vinegar, or lemon or lime juices. Choose low-sodium or salt-free products whenever you can. This is especially important for foods like broths, soups, or soy sauce.  What foods and drinks should I avoid on the DASH diet?    Grains to avoid - Salted breads, rolls, crackers, quick breads, self-rising flours, biscuit mixes, regular breadcrumbs, instant hot cereals, commercially prepared rice, pasta, stuffing mixes.   Fruits and vegetables to avoid - Store-bought prepared potatoes and vegetable mixes, regular canned vegetables and juices, vegetables frozen with sauce, pickled vegetables, processed fruits with salt or sodium.   Dairy products to avoid - Whole milk, malted milk, chocolate milk, buttermilk, full-fat cheese, ice cream.   Meats to avoid - Smoked, cured, salted, or canned fish such as sardines or anchovies. High-fat cuts of meat like beef, lamb, pork, rodriguez and sausage, and chicken with the skin on it.   Fats and oils to avoid - Eat fewer solid fats like butter, lard, and hard stick margarine. Eat less saturated fat, trans fat, and total fat.   Condiments and snacks to avoid - Salted and canned peas, beans, and olives. Salted snack foods, fried foods, soda, other sweetened drinks.   Sweets to avoid - High-fat baked goods such as muffins, donuts, pastries, and commercial baked goods. Candy bars.   Alcohol - If you choose to drink alcohol, limit the amount. Most doctors recommend limiting alcohol to no more than 1 drink a day (for females) or 2 drinks a day (for males).  What else do I need to know?    Get regular physical activity to make this diet help you even more. Even gentle forms of activity, like walking, are good for your health.   Try baking or broiling instead of frying foods.   Write down the foods that you eat. This will help you track what you have eaten each week.   When you go to the grocery store, have a list or a meal plan. Don't shop when you are hungry, since this  might lead you to buy more unhealthy foods.   Read food labels with care (figure 2). They show you how much is in a serving. The amount is given as a percentage of the total amount that you need each day. Reading labels helps you make healthy food choices.  All topics are updated as new evidence becomes available and our peer review process is complete.  This topic retrieved from [facility] on: Feb 26, 2024.  Topic 967325 Version 1.0  Release: 32.2.4 - C32.56  © 2024 [employer] and/or its affiliates. All rights reserved.  figure 1: DASH diet     Graphic [id number] Version 1.0  figure 2: Food label     Graphic [id number] Version 1.0  Consumer Information Use and Disclaimer   Disclaimer: This generalized information is a limited summary of diagnosis, treatment, and/or medication information. It is not meant to be comprehensive and should be used as a tool to help the user understand and/or assess potential diagnostic and treatment options. It does NOT include all information about conditions, treatments, medications, side effects, or risks that may apply to a specific patient. It is not intended to be medical advice or a substitute for the medical advice, diagnosis, or treatment of a health care provider based on the health care provider's examination and assessment of a patient's specific and unique circumstances. Patients must speak with a health care provider for complete information about their health, medical questions, and treatment options, including any risks or benefits regarding use of medications. This information does not endorse any treatments or medications as safe, effective, or approved for treating a specific patient. [employer] and its affiliates disclaim any warranty or liability relating to this information or the use thereof.The use of this information is governed by the Terms of Use, available at https://www.[employer] Partnersuwer.com/en/know/clinical-effectiveness-terms. 2024© [employer], Inc. and its  affiliates and/or licensors. All rights reserved.  Copyright   © 2024 [employer] and/or its affiliates. All rights reserved.

## 2025-06-05 NOTE — PROGRESS NOTES
Cardiology  Follow Up   Office Visit Note  Amy Can   69 y.o.   female   MRN: [id number]  [facility] [place]  [address]  [address]  [place] [address] [phone]    PCP: Ross Griffith MD  Cardiologist: Dr. Joseph                Summary of Plan:  Heart healthy diet: Mediterranean or DASH.  Education provided  Carotid US:  pending  1 week zio patch.  Will be mailed to her houe re;  palpsitations.  HX cerebellar infarcts- chronic   Increase rosuva to 10 mg/d  Follow-up with Dr. Joseph  Colon Ca screenin2021 , up to date        Assessment/Plan  Palpitations  Will order a 1 week Zio patch.  This will be mailed to her house.  Rule out A-fib  She has a history of cerebellar infarcts, per imaging  TSH is satisfactory  Hypertension  /90  On verapamil 80 mg 3 times a day, chlorthalidone 12.5 mg daily, Mavik 4 mg daily  Periodic home BP monitoring  DASH diet  Dyslipidemia  3/7/2025: Calculated LDL 80.     On rosuvastatin 5 mg daily.  Will increase to 10 mg daily for goal LDL less than 70  Moderate aortic insufficiency.  Maintain blood pressure less than 130/80  Serial echocardiograms  History of TIA  Chronic cerebellar infarcts per MRI brain.   follow-up with neurology  On aspirin, statin  MELISSA. Compliant with CPAP  Obesity, BMI 39.44 kg/m².  She would benefit from weight loss.  Prior tobacco, in remission  Cardiac testing  TTE 3/9/2025.  EF 50 to 55%.  Moderate LVH.  Grade 1 DD.  Moderate AI with a centrally directed jet.  No significant change compared to prior study 3/5/2024                HPI  Amy Can is a 69 y.o.year old female with hypertension, aortic valve disease and LVH.  She follows Dr. Joseph and was last seen 5/3/2024.        5/3/2024 OV Dr. Joseph  Per his note  Interval History:  Patient is here for a f/u visit. Patient has AVD.  Lipid profile 2024 demonstrated total cholesterol of 197 with an HDL of 86 and a  calculated LDL of 90.  Echo 3/5/2024 demonstrated LVEF of 55%.  Moderate AI with mild AS was noted with mean gradient across the valve of 15 mmHg.  There is mild TR with a mild elevation in PASP.  Ascending aorta was 4.3 cm.  Peak velocity across the AV was 2.48 m/s.  LVOT peak velocity was 1.32 m/s.  There was no significant change compared to 2/18/2022.  CT of the chest 8/1/2023 demonstrated fusiform ectasia of the ascending thoracic aorta at 4.1 cm.  Vital signs are stable today.  EKG today demonstrates NSR with PRWP with no significant change compared to a prior tracing 7/29/2021.  Patient is HTN and aortic valve disease are stable on her current medicine.  Patient has had no angina or significant dyspnea.       6/5/2025  Cardiology follow-up  She was admitted 3/10/2025 with chest pain and strokelike symptoms, diagnosed with hypertensive urgency /101.  She was evaluated by neurology.  They felt her symptoms were less likely TIA more likely musculoskeletal versus hypertension related.   + palpitations  Imagiing;  old cerebellar infarcts  She was discharged on her home regimen of labetalol 100 twice daily verapamil 80 3 times daily trandolapril 4 mg daily.  She was started on chlorthalidone 12.5 mg daily  She was started on aspirin 81 mg /d    ROS: Occasional short of breath symptoms.  Complains of dizziness often, occ ankle swelling and occasional palpitations.  /90  Weight 239 pounds  Last labs 3/25/2025 creatinine 1.10 BUN 26 potassium 4.0.  Hemoglobin 15.9.  LDL 80    Current cardiac medications: Verapamil 80 mg  TID,  rosuvastatin 5 mg daily, aspirin 81, chlorthalidone 12.5 mg daily, labetalol 100 mg twice daily    Carotid ultrasound has been requested and is pending.  Will order 1 week Zio patch given her palpitations to rule out A-fib in the context of history of cerebellar infarcts.  Will increase rosuvastatin to 10 mg daily to optimize LDL less than 70.      Review of Systems   Constitutional:  Positive for malaise/fatigue. Negative for chills.   Cardiovascular:  Positive for leg swelling and palpitations. Negative for chest pain, claudication, cyanosis, dyspnea on exertion, irregular heartbeat, near-syncope, orthopnea, paroxysmal nocturnal dyspnea and syncope.   Respiratory:  Positive for shortness of breath. Negative for cough.    Gastrointestinal:  Negative for heartburn and nausea.   Neurological:  Positive for dizziness. Negative for focal weakness, headaches, light-headedness and weakness.   All other systems reviewed and are negative.        Assessment  Diagnoses and all orders for this visit:    Essential hypertension  -     POCT ECG    Aortic dilatation (HCC)    TIA (transient ischemic attack)    Obstructive sleep apnea syndrome    Obesity, morbid (HCC)    Pure hypercholesterolemia  -     Discontinue: rosuvastatin (CRESTOR) 10 MG tablet; Take 1 tablet (10 mg total) by mouth daily    Palpitations  -     Zio Monitor    Old cerebellar infarct without late effect  -     Zio Monitor    Other orders  -     Ambulatory Referral to Cardiology        Past Medical History[1]    Past Surgical History[2]        Allergies  Allergies[3]      Medications  Current Medications[4]      Social History     Socioeconomic History    Marital status: /Civil Union     Spouse name: Not on file    Number of children: 2    Years of education: Not on file    Highest education level: Not on file   Occupational History    Not on file   Tobacco Use    Smoking status: Former     Current packs/day: 0.00     Average packs/day: 1 pack/day for 36.7 years (36.7 ttl pk-yrs)     Types: Cigarettes     Start date: 1968     Quit date: 2005     Years since quittin.4    Smokeless tobacco: Never   Vaping Use    Vaping status: Never Used   Substance and Sexual Activity    Alcohol use: Yes     Alcohol/week: 4.0 standard drinks of alcohol     Types: 4 Standard drinks or equivalent per week    Drug use: Never    Sexual  activity: Not Currently     Partners: Male     Birth control/protection: None   Other Topics Concern    Not on file   Social History Narrative    2 daughters    Travel outside [place]: No      Social Drivers of Health     Financial Resource Strain: Low Risk  (4/17/2023)    Overall Financial Resource Strain (CARDIA)     Difficulty of Paying Living Expenses: Not hard at all   Food Insecurity: No Food Insecurity (5/1/2025)    Nursing - Inadequate Food Risk Classification     Worried About Running Out of Food in the Last Year: Never true     Ran Out of Food in the Last Year: Never true     Ran Out of Food in the Last Year: Never true   Transportation Needs: No Transportation Needs (5/1/2025)    PRAPARE - Transportation     Lack of Transportation (Medical): No     Lack of Transportation (Non-Medical): No   Physical Activity: Not on file   Stress: Not on file   Social Connections: Not on file   Intimate Partner Violence: Unknown (3/7/2025)    Nursing IPS     Feels Physically and Emotionally Safe: Not on file     Physically Hurt by Someone: Not on file     Humiliated or Emotionally Abused by Someone: Not on file     Physically Hurt by Someone: No     Hurt or Threatened by Someone: No   Housing Stability: Low Risk  (5/1/2025)    Housing Stability Vital Sign     Unable to Pay for Housing in the Last Year: No     Number of Times Moved in the Last Year: 0     Homeless in the Last Year: No       Family History[5]    Physical Exam  Vitals and nursing note reviewed.   Constitutional:       General: She is not in acute distress.     Appearance: She is obese. She is not diaphoretic.   HENT:      Head: Normocephalic and atraumatic.     Eyes:      Conjunctiva/sclera: Conjunctivae normal.       Cardiovascular:      Rate and Rhythm: Normal rate and regular rhythm.      Pulses: Intact distal pulses.      Heart sounds: Normal heart sounds.   Pulmonary:      Effort: Pulmonary effort is normal.      Breath sounds: Normal breath sounds.  "  Abdominal:      General: Bowel sounds are normal.      Palpations: Abdomen is soft.     Musculoskeletal:         General: Normal range of motion.      Cervical back: Normal range of motion and neck supple.     Skin:     General: Skin is warm and dry.     Neurological:      Mental Status: She is alert and oriented to person, place, and time.         Vitals: Blood pressure 116/90, pulse 92, weight 109 kg (239 lb 12.8 oz), SpO2 96%.   Wt Readings from Last 3 Encounters:   25 109 kg (239 lb 12.8 oz)   25 108 kg (237 lb)   25 107 kg (235 lb)           Labs & Results:  Lab Results   Component Value Date    WBC 6.13 03/10/2025    HGB 15.9 (H) 03/10/2025    HCT 49.3 (H) 03/10/2025    MCV 94 03/10/2025     03/10/2025     No results found for: \"BNP\"  No components found for: \"CHEM\"  No results found for: \"CKTOTAL\", \"TROPONINI\", \"TROPONINT\", \"CKMBINDEX\"  Results for orders placed during the hospital encounter of 21    Echo complete with contrast if indicated    Narrative  [place] [id number] [phone]    Transthoracic Echocardiogram  2D, M-mode, Doppler, and Color Doppler    Study date:  2021    Patient: YANNI BOCANEGRA  MR number: [id number]  Account number: [id number]  : 1955  Age: 65 years  Gender: Female  Status: Outpatient  Location: [address] [place] [address] [facility] [place]  Height: 66 in  Weight: 213.6 lb  BP: 138/ 88 mmHg    Indications: Aortic valve disease    Diagnoses: I10. - Essential (primary) hypertension, I35.9 - Nonrheumatic aortic valve disorder, unspecified, I51.7 - Cardiomegaly    Sonographer:  Teresa Nunes BS, RDCS, RVT, RDMS  Primary Physician:  Ross Griffith MD  Referring Physician:  Deyvi Joseph MD  Group:  [facility]  Cardiology Fellow:  Trav Salas MD  Interpreting Physician:  Jonathan King MD    SUMMARY    LEFT VENTRICLE:  Systolic function was normal. Ejection " fraction was estimated to be 60 %.  There were no regional wall motion abnormalities.  Wall thickness was at the upper limits of normal.  Doppler parameters were consistent with abnormal left ventricular relaxation (grade 1 diastolic dysfunction).    AORTIC VALVE:  The valve was trileaflet. Leaflets exhibited mildly to moderately increased thickness and normal cuspal separation.  There was moderate regurgitation.    TRICUSPID VALVE:  There was trace regurgitation.    PULMONIC VALVE:  There was trace regurgitation.    AORTA:  The root exhibited normal size.  The ascending aorta maximal AP dimension was 40 mm.    HISTORY: PRIOR HISTORY: HTN, LVH, AR, AV sclerosis, former smoker, obesity with a BMI of 34.5.    PROCEDURE: The study was performed in the [address] [place] [address] [facility]. This was a routine study. The transthoracic approach was used. The study included complete 2D imaging, M-mode, complete spectral Doppler, and color Doppler. The  heart rate was 55 bpm, at the start of the study. Images were obtained from the parasternal, apical, subcostal, and suprasternal notch acoustic windows. Image quality was good.    LEFT VENTRICLE: Size was normal. Systolic function was normal. Ejection fraction was estimated to be 60 %. There were no regional wall motion abnormalities. Wall thickness was at the upper limits of normal. DOPPLER: Doppler parameters were  consistent with abnormal left ventricular relaxation (grade 1 diastolic dysfunction).    RIGHT VENTRICLE: The size was normal. Systolic function was normal. Wall thickness was normal.    LEFT ATRIUM: Size was normal.    RIGHT ATRIUM: Size was normal.    MITRAL VALVE: Valve structure was normal. There was normal leaflet separation. DOPPLER: The transmitral velocity was within the normal range. There was no evidence for stenosis. There was no significant regurgitation.    AORTIC VALVE: The valve was trileaflet. Leaflets exhibited mildly to moderately increased  thickness and normal cuspal separation. DOPPLER: Transaortic velocity was within the normal range. There was no evidence for stenosis. There was  moderate regurgitation.    TRICUSPID VALVE: The valve structure was normal. There was normal leaflet separation. DOPPLER: The transtricuspid velocity was within the normal range. There was no evidence for stenosis. There was trace regurgitation. Estimated peak PA  pressure was 31 mmHg.    PULMONIC VALVE: Leaflets exhibited normal thickness, no calcification, and normal cuspal separation. DOPPLER: The transpulmonic velocity was within the normal range. There was trace regurgitation.    PERICARDIUM: There was no pericardial effusion. The pericardium was normal in appearance.    AORTA: The root exhibited normal size.    SYSTEMIC VEINS: IVC: The inferior vena cava was normal in size.    MEASUREMENT TABLES    2D MEASUREMENTS  Aorta   (Reference normals)  AAo max AP diam   40 mm   (--)    SYSTEM MEASUREMENT TABLES    2D  %FS: 30.47 %  Ao asc: 3.91 cm  EDV(Teich): 115.85 ml  EF(Teich): 57.7 %  ESV(Teich): 49 ml  IVSd: 1.16 cm  LA Area: 14.69 cm2  LVEDV MOD A4C: 118.03 ml  LVEF MOD A4C: 63.13 %  LVESV MOD A4C: 43.51 ml  LVIDd: 4.96 cm  LVIDs: 3.45 cm  LVLd A4C: 8.35 cm  LVLs A4C: 7.29 cm  LVOT Diam: 2.01 cm  LVPWd: 0.96 cm  RA Area: 14.76 cm2  RVIDd: 2.96 cm  SV MOD A4C: 74.52 ml  SV(Teich): 66.85 ml    CW  AR Dec [place]: 1.62 m/s2  AR Dec Time: 2468.33 ms  AR PHT: 715.82 ms  AR Vmax: 4.01 m/s  AR maxP.26 mmHg  AV Env.Ti: 338.73 ms  AV VTI: 51.71 cm  AV Vmax: 2.26 m/s  AV Vmean: 1.53 m/s  AV maxP.37 mmHg  AV meanPG: 10.6 mmHg  TR Vmax: 2.57 m/s  TR maxP.44 mmHg    MM  AV Cusp: 1.57 cm  Ao Diam: 3.66 cm  LA Diam: 3.63 cm  LA/Ao: 0.99  TAPSE: 2.91 cm    PW  JULISSA (VTI): 2.19 cm2  JULISSA Vmax: 2 cm2  AVAI (VTI): 0 cm2/m2  AVAI Vmax: 0 cm2/m2  E' Av.11 m/s  E' Lat: 0.14 m/s  E' Sept: 0.08 m/s  E/E' Av.37  E/E' Lat: 6.38  E/E' Sept: 12.13  LVOT Env.Ti: 368.22 ms  LVOT  VTI: 35.65 cm  LVOT Vmax: 1.42 m/s  LVOT Vmean: 0.97 m/s  LVOT maxP.08 mmHg  LVOT meanP.37 mmHg  LVSI Dopp: 55.05 ml/m2  LVSV Dopp: 113.4 ml  MV A [place]: 1.12 m/s  MV Dec [place]: 4.78 m/s2  MV DecT: 193.28 ms  MV E [place]: 0.92 m/s  MV E/A Ratio: 0.83  MV PHT: 56.05 ms  MVA By PHT: 3.93 cm2    [employer] Commission Accredited Echocardiography Laboratory    Prepared and electronically signed by    Jonathan King MD  Signed 2021 10:59:49    No results found for this or any previous visit.    No valid procedures specified.  No results found for this or any previous visit.                This note was completed in part utilizing [employer] direct voice recognition software.   Grammatical errors, random word insertion, spelling mistakes, and incomplete sentences may be an occasional consequence of the system secondary to software limitations, ambient noise and hardware issues. At the time of dictation, efforts were made to edit, clarify and /or correct errors.  Please read the chart carefully and recognize, using context, where substitutions have occurred.  If you have any questions or concerns about the context, text or information contained within the body of this dictation, please contact myself, the provider, for further clarification           [1]   Past Medical History:  Diagnosis Date    Allergic rhinitis     Aortic regurgitation     Chronic kidney disease     Heart murmur     HTN (hypertension)     Hyperlipidemia     Hypertension     Nonrheumatic aortic (valve) insufficiency     Sleep apnea     Vitamin B12 deficiency    [2]   Past Surgical History:  Procedure Laterality Date    BUNIONECTOMY      CHOLECYSTECTOMY      COLONOSCOPY  2018    DILATION AND CURETTAGE OF UTERUS      MAMMO (HISTORICAL)  2017 and 3/21/2017     FL LAPAROSCOPIC APPENDECTOMY N/A 2021    Procedure: APPENDECTOMY LAPAROSCOPIC;  Surgeon: Percy Peñaloza MD;  Location: [facility];   Service: Colorectal    WISDOM TOOTH EXTRACTION     [3]   Allergies  Allergen Reactions    Penicillins Rash     Other reaction(s): PENICILLIN G POTASSIUM (PENICILLIN)     [4]   Current Outpatient Medications:     aspirin 81 mg chewable tablet, Chew 1 tablet (81 mg total) daily, Disp: , Rfl:     Calcium 600 MG tablet, Take 2 tablets by mouth daily, Disp: , Rfl:     calcium citrate-vitamin D (CITRACAL+D) 315-200 MG-UNIT per tablet, Take 1 tablet by mouth 2 (two) times a day, Disp: , Rfl:     chlorthalidone 25 mg tablet, Take 0.5 tablets (12.5 mg total) by mouth daily, Disp: 15 tablet, Rfl: 5    cholecalciferol (VITAMIN D3) 1,000 units tablet, Take 1,000 Units by mouth in the morning. 5,000 units daily twice a day., Disp: , Rfl:     cyanocobalamin (VITAMIN B-12) 500 mcg tablet, Take 1 tablet by mouth in the morning., Disp: , Rfl:     labetalol (NORMODYNE) 100 mg tablet, take 1 tablet by mouth twice a day, Disp: 180 tablet, Rfl: 1    Multiple Vitamins-Minerals (MULTIVITAMIN ADULT) TABS, Take 1 tablet by mouth in the morning., Disp: , Rfl:     protriptyline (VIVACTIL) 5 MG tablet, take 1 tablet by mouth at bedtime, Disp: 90 tablet, Rfl: 1    trandolapril (MAVIK) 4 MG tablet, take 1 tablet by mouth once daily, Disp: 90 tablet, Rfl: 1    verapamil (CALAN) 80 mg tablet, Take 1 tablet (80 mg total) by mouth 3 (three) times a day, Disp: 270 tablet, Rfl: 1    acetaminophen (TYLENOL) 650 mg CR tablet, Take 1 tablet (650 mg total) by mouth every 8 (eight) hours as needed for mild pain (Patient not taking: Reported on 3/14/2025), Disp: 30 tablet, Rfl: 0    Prolensa 0.07 % SOLN, , Disp: , Rfl:     rosuvastatin (CRESTOR) 10 MG tablet, Take 1 tablet (10 mg total) by mouth daily, Disp: 90 tablet, Rfl: 1  [5]   Family History  Problem Relation Name Age of Onset    Coronary artery disease Mother      Kidney cancer Father      No Known Problems Sister      No Known Problems Sister      No Known Problems Sister

## 2025-06-06 DIAGNOSIS — E78.00 PURE HYPERCHOLESTEROLEMIA: ICD-10-CM

## 2025-06-06 RX ORDER — ROSUVASTATIN CALCIUM 10 MG/1
10 TABLET, COATED ORAL DAILY
Qty: 90 TABLET | Refills: 1 | Status: SHIPPED | OUTPATIENT
Start: 2025-06-06

## 2025-06-06 NOTE — TELEPHONE ENCOUNTER
Reason for call:   [x] Refill   [] Prior Auth  [x] Other: Not a duplicate. Script did not go through to pharmacy.     Office:   [x] PCP/Provider -   [] Specialty/Provider -     Medication: rosuvastatin (CRESTOR) 10 MG tablet     Dose/Frequency: Take 1 tablet (10 mg total) by mouth daily     Quantity: 90    Pharmacy: [facility] - [place], [place]     Local Pharmacy   Does the patient have enough for 3 days?   [] Yes   [x] No - Send as HP to POD

## 2025-06-22 DIAGNOSIS — I10 ESSENTIAL HYPERTENSION: ICD-10-CM

## 2025-06-22 RX ORDER — TRANDOLAPRIL TABLETS 4 MG/1
4 TABLET ORAL DAILY
Qty: 90 TABLET | Refills: 1 | Status: SHIPPED | OUTPATIENT
Start: 2025-06-22

## 2025-06-26 DIAGNOSIS — G43.109 MIGRAINE WITH AURA AND WITHOUT STATUS MIGRAINOSUS, NOT INTRACTABLE: ICD-10-CM

## 2025-06-27 RX ORDER — PROTRIPTYLINE HYDROCHLORIDE 5 MG/1
5 TABLET, FILM COATED ORAL
Qty: 30 TABLET | Refills: 1 | Status: SHIPPED | OUTPATIENT
Start: 2025-06-27

## 2025-06-29 DIAGNOSIS — I10 ESSENTIAL HYPERTENSION: ICD-10-CM

## 2025-07-01 RX ORDER — TRANDOLAPRIL TABLETS 4 MG/1
4 TABLET ORAL DAILY
Qty: 90 TABLET | Refills: 0 | Status: SHIPPED | OUTPATIENT
Start: 2025-07-01

## 2025-07-09 ENCOUNTER — TELEPHONE (OUTPATIENT)
Age: 70
End: 2025-07-09

## 2025-07-09 NOTE — TELEPHONE ENCOUNTER
Caller: Amy (patient)    Doctor: ABRAN Good ([place])    Reason for call: Patient called stating that Kalpana instructed her to call the office for the ZIO monitor results if patient did not receive a call within 2 weeks. I see preliminary interpretation in chart but no final results for CTS RN to discuss with patient. Patient requests a call back from Kalpana / clinical team to discuss timing of results to be available and to discuss results. Please call patient.    Call back#: [phone]

## 2025-07-11 LAB
CV ZIO BASELINE AVG BPM: 73 BPM
CV ZIO BASELINE BPM HIGH: 174 BPM
CV ZIO BASELINE BPM LOW: 45 BPM
CV ZIO DEVICE ANALYSIS TIME: NORMAL
CV ZIO ECT SVE COUNT: 630 EPISODES
CV ZIO ECT SVE CPLT COUNT: 30 EPISODES
CV ZIO ECT SVE CPLT FREQ: NORMAL
CV ZIO ECT SVE FREQ: NORMAL
CV ZIO ECT SVE TPLT COUNT: 29 EPISODES
CV ZIO ECT SVE TPLT FREQ: NORMAL
CV ZIO ECT VE COUNT: 903 EPISODES
CV ZIO ECT VE CPLT COUNT: 1 EPISODES
CV ZIO ECT VE CPLT FREQ: NORMAL
CV ZIO ECT VE FREQ: NORMAL
CV ZIO ECT VE TPLT COUNT: 1 EPISODES
CV ZIO ECT VE TPLT FREQ: NORMAL
CV ZIO ECTOPIC SVE COUPLET RAW PERCENT: 0.01 %
CV ZIO ECTOPIC SVE ISOLATED PERCENT: 0.09 %
CV ZIO ECTOPIC SVE TRIPLET RAW PERCENT: 0.01 %
CV ZIO ECTOPIC VE COUPLET RAW PERCENT: 0 %
CV ZIO ECTOPIC VE ISOLATED PERCENT: 0.13 %
CV ZIO ECTOPIC VE TRIPLET RAW PERCENT: 0 %
CV ZIO ENROLLMENT END: NORMAL
CV ZIO ENROLLMENT START: NORMAL
CV ZIO PATIENT EVENTS DIARIES: 1
CV ZIO PATIENT EVENTS TRIGGERS: 1
CV ZIO PAUSE COUNT: 0
CV ZIO PRESCRIPTION STATUS: NORMAL
CV ZIO SVT AVG BPM: 115 BPM
CV ZIO SVT BPM HIGH: 174 BPM
CV ZIO SVT BPM LOW: 66 BPM
CV ZIO SVT COUNT: 55
CV ZIO SVT F EPI AVG BPM: 143 BPM
CV ZIO SVT F EPI BEATS: 9 BEATS
CV ZIO SVT F EPI BPM HIGH: 174 BPM
CV ZIO SVT F EPI BPM LOW: 112 BPM
CV ZIO SVT F EPI DUR: 3.4 SEC
CV ZIO SVT F EPI END: NORMAL
CV ZIO SVT F EPI START: NORMAL
CV ZIO SVT L EPI AVG BPM: 116 BPM
CV ZIO SVT L EPI BEATS: 156 BEATS
CV ZIO SVT L EPI BPM HIGH: 133 BPM
CV ZIO SVT L EPI BPM LOW: 83 BPM
CV ZIO SVT L EPI DUR: 81.3 SEC
CV ZIO SVT L EPI END: NORMAL
CV ZIO SVT L EPI START: NORMAL
CV ZIO TOTAL  ENROLLMENT PERIOD: NORMAL
CV ZIO VT COUNT: 0

## 2025-07-14 DIAGNOSIS — G45.9 TIA (TRANSIENT ISCHEMIC ATTACK): Primary | ICD-10-CM

## 2025-07-14 DIAGNOSIS — Z86.73 HISTORY OF STROKE: ICD-10-CM

## 2025-07-15 ENCOUNTER — TELEPHONE (OUTPATIENT)
Dept: CARDIOLOGY CLINIC | Facility: CLINIC | Age: 70
End: 2025-07-15

## 2025-07-15 ENCOUNTER — TELEPHONE (OUTPATIENT)
Age: 70
End: 2025-07-15

## 2025-07-15 DIAGNOSIS — Z86.73 HISTORY OF STROKE: ICD-10-CM

## 2025-07-15 DIAGNOSIS — G45.9 TIA (TRANSIENT ISCHEMIC ATTACK): Primary | ICD-10-CM

## 2025-07-15 DIAGNOSIS — R29.90 STROKE-LIKE SYMPTOMS: ICD-10-CM

## 2025-07-16 NOTE — TELEPHONE ENCOUNTER
Patient is aware that she needs to wear a monitor for 21 days. She states she already wore one for 7 days report in chart. Monitor for 14 days ordered by Kalpana and to be mailed out.  Patient awaiting monitor.

## 2025-07-27 DIAGNOSIS — I10 ESSENTIAL HYPERTENSION: ICD-10-CM

## 2025-07-29 RX ORDER — LABETALOL 100 MG/1
100 TABLET, FILM COATED ORAL 2 TIMES DAILY
Qty: 180 TABLET | Refills: 1 | Status: SHIPPED | OUTPATIENT
Start: 2025-07-29

## 2025-08-07 LAB
25(OH)D3 SERPL-MCNC: 36 NG/ML (ref 30–100)
BUN SERPL-MCNC: 20 MG/DL (ref 7–25)
BUN/CREAT SERPL: ABNORMAL (CALC) (ref 6–22)
CALCIUM SERPL-MCNC: 9.3 MG/DL (ref 8.6–10.4)
CHLORIDE SERPL-SCNC: 103 MMOL/L (ref 98–110)
CO2 SERPL-SCNC: 25 MMOL/L (ref 20–32)
CREAT SERPL-MCNC: 0.98 MG/DL (ref 0.6–1)
CRP SERPL-MCNC: <3 MG/L
ERYTHROCYTE [SEDIMENTATION RATE] IN BLOOD BY WESTERGREN METHOD: 9 MM/H
GFR/BSA.PRED SERPLBLD CYS-BASED-ARV: 62 ML/MIN/1.73M2
GLUCOSE SERPL-MCNC: 101 MG/DL (ref 65–99)
MAGNESIUM SERPL-MCNC: 2 MG/DL (ref 1.5–2.5)
POTASSIUM SERPL-SCNC: 4.2 MMOL/L (ref 3.5–5.3)
SODIUM SERPL-SCNC: 137 MMOL/L (ref 135–146)
URATE SERPL-MCNC: 6.2 MG/DL (ref 2.5–7)
VIT B12 SERPL-MCNC: >2000 PG/ML (ref 200–1100)

## 2025-08-13 ENCOUNTER — PREP FOR PROCEDURE (OUTPATIENT)
Dept: CARDIOLOGY CLINIC | Facility: CLINIC | Age: 70
End: 2025-08-13

## 2025-08-22 ENCOUNTER — OFFICE VISIT (OUTPATIENT)
Dept: INTERNAL MEDICINE CLINIC | Facility: CLINIC | Age: 70
End: 2025-08-22

## 2025-08-22 VITALS
SYSTOLIC BLOOD PRESSURE: 120 MMHG | WEIGHT: 235 LBS | HEIGHT: 65 IN | OXYGEN SATURATION: 98 % | TEMPERATURE: 98.8 F | BODY MASS INDEX: 39.15 KG/M2 | DIASTOLIC BLOOD PRESSURE: 82 MMHG | HEART RATE: 81 BPM

## 2025-08-22 DIAGNOSIS — E66.811 OBESITY (BMI 30.0-34.9): ICD-10-CM

## 2025-08-22 DIAGNOSIS — I35.1 NONRHEUMATIC AORTIC (VALVE) INSUFFICIENCY: ICD-10-CM

## 2025-08-22 DIAGNOSIS — G45.9 TIA (TRANSIENT ISCHEMIC ATTACK): ICD-10-CM

## 2025-08-22 DIAGNOSIS — I10 ESSENTIAL HYPERTENSION: ICD-10-CM

## 2025-08-22 DIAGNOSIS — I77.819 AORTIC DILATATION (HCC): ICD-10-CM

## 2025-08-22 DIAGNOSIS — G43.109 MIGRAINE WITH AURA AND WITHOUT STATUS MIGRAINOSUS, NOT INTRACTABLE: Primary | ICD-10-CM

## 2025-08-22 DIAGNOSIS — E53.8 VITAMIN B12 DEFICIENCY: ICD-10-CM

## 2025-08-22 RX ORDER — PROTRIPTYLINE HYDROCHLORIDE 5 MG/1
5 TABLET, FILM COATED ORAL
Qty: 30 TABLET | Refills: 1 | Status: SHIPPED | OUTPATIENT
Start: 2025-08-22

## (undated) DEVICE — THE ECHELON, ECHELON ENDOPATH™ AND ECHELON FLEX™ FAMILIES OF ENDOSCOPIC LINEAR CUTTERS AND RELOADS ARE STERILE, SINGLE PATIENT USE INSTRUMENTS THAT SIMULTANEOUSLY CUT AND STAPLE TISSUE. THERE ARE SIX STAGGERED ROWS OF STAPLES, THREE ON EITHER SIDE OF THE CUT LINE. THE 45 MM INSTRUMENTS HAVE A STAPLE LINE THATIS APPROXIMATELY 45 MM LONG AND A CUT LINE THAT IS APPROXIMATELY 42 MM LONG. THE SHAFT CAN ROTATE FREELY IN BOTH DIRECTIONS AND AN ARTICULATION MECHANISM ON ARTICULATING INSTRUMENTS ENABLES BENDING THE DISTAL PORTIONOF THE SHAFT TO FACILITATE LATERAL ACCESS OF THE OPERATIVE SITE.THE INSTRUMENTS ARE SHIPPED WITHOUT A RELOAD AND MUST BE LOADED PRIOR TO USE. A STAPLE RETAINING CAP ON THE RELOAD PROTECTS THE STAPLE LEG POINTS DURING SHIPPING AND TRANSPORTATION. THE INSTRUMENTS’ LOCK-OUT FEATURE IS DESIGNED TO PREVENT A USED RELOAD FROM BEING REFIRED.: Brand: ECHELON ENDOPATH

## (undated) DEVICE — ADHESIVE SKIN HIGH VISCOSITY EXOFIN 1ML

## (undated) DEVICE — TROCAR: Brand: KII® SLEEVE

## (undated) DEVICE — ENDOSCOPIC LINEAR CUTTER RELOADS BLUE 3.5MM: Brand: ECHELON; ENDOPATH

## (undated) DEVICE — HARMONIC ACE 5MM DIAMETER SHEARS 36CM SHAFT LENGTH + ADAPTIVE TISSUE TECHNOLOGY FOR USE WITH GENERATOR G11: Brand: HARMONIC ACE

## (undated) DEVICE — THE ECHELON FLEX POWERED PLUS ARTICULATING ENDOSCOPIC LINEAR CUTTERS ARE STERILE, SINGLE PATIENT USE INSTRUMENTS THAT SIMULTANEOUSLYCUT AND STAPLE TISSUE. THERE ARE SIX STAGGERED ROWS OF STAPLES, THREE ON EITHER SIDE OF THE CUT LINE. THE ECHELON FLEX 45 POWERED PLUSINSTRUMENTS HAVE A STAPLE LINE THAT IS APPROXIMATELY 45 MM LONG AND A CUT LINE THAT IS APPROXIMATELY 42 MM LONG. THE SHAFT CAN ROTATE FREELYIN BOTH DIRECTIONS AND AN ARTICULATION MECHANISM ENABLES THE DISTAL PORTION OF THE SHAFT TO PIVOT TO FACILITATE LATERAL ACCESS TO THE OPERATIVESITE.THE INSTRUMENTS ARE PACKAGED WITH A PRIMARY LITHIUM BATTERY PACK THAT MUST BE INSTALLED PRIOR TO USE. THERE ARE SPECIFIC REQUIREMENTS FORDISPOSING OF THE BATTERY PACK. REFER TO THE BATTERY PACK DISPOSAL SECTION.THE INSTRUMENTS ARE PACKAGED WITHOUT A RELOAD AND MUST BE LOADED PRIOR TO USE. A STAPLE RETAINING CAP ON THE RELOAD PROTECTS THE STAPLE LEGPOINTS DURING SHIPPING AND TRANSPORTATION. THE INSTRUMENTS’ LOCK-OUT FEATURE IS DESIGNED TO PREVENT A USED OR IMPROPERLY INSTALLED RELOADFROM BEING REFIRED OR AN INSTRUMENT FROM BEING FIRED WITHOUT A RELOAD.: Brand: ECHELON FLEX

## (undated) DEVICE — TROCAR: Brand: KII FIOS FIRST ENTRY

## (undated) DEVICE — CHLORAPREP HI-LITE 26ML ORANGE

## (undated) DEVICE — SCD SEQUENTIAL COMPRESSION COMFORT SLEEVE MEDIUM KNEE LENGTH: Brand: KENDALL SCD

## (undated) DEVICE — INTENDED FOR TISSUE SEPARATION, AND OTHER PROCEDURES THAT REQUIRE A SHARP SURGICAL BLADE TO PUNCTURE OR CUT.: Brand: BARD-PARKER SAFETY BLADES SIZE 11, STERILE

## (undated) DEVICE — GLOVE SRG BIOGEL 8

## (undated) DEVICE — SUT MONOCRYL 4-0 PS-2 18 IN Y496G

## (undated) DEVICE — SUT VICRYL PLUS 0 UR-6 27IN VCP603H

## (undated) DEVICE — STERILE LATEX POWDER-FREE SURGICAL GLOVESWITH NITRILE COATING: Brand: PROTEXIS

## (undated) DEVICE — PACK PBDS LAP CHOLE RF